# Patient Record
Sex: MALE | Race: WHITE | NOT HISPANIC OR LATINO | Employment: OTHER | URBAN - NONMETROPOLITAN AREA
[De-identification: names, ages, dates, MRNs, and addresses within clinical notes are randomized per-mention and may not be internally consistent; named-entity substitution may affect disease eponyms.]

---

## 2017-01-03 ENCOUNTER — CLINICAL SUPPORT (OUTPATIENT)
Dept: CARDIOLOGY | Facility: CLINIC | Age: 72
End: 2017-01-03

## 2017-01-03 DIAGNOSIS — R00.0 TACHYCARDIA: Primary | ICD-10-CM

## 2017-01-03 PROCEDURE — 93000 ELECTROCARDIOGRAM COMPLETE: CPT | Performed by: INTERNAL MEDICINE

## 2017-01-03 NOTE — PROGRESS NOTES
Procedure     ECG 12 Lead  Date/Time: 1/3/2017 6:24 PM  Performed by: NEGRO ELLIS  Authorized by: NEGRO ELLIS   Comparison: compared with previous ECG from 5/6/2016  Similar to previous ECG  Rhythm: sinus rhythm  Rate: normal  QRS axis: right  Q waves: II, aVF, V6 and V5  Clinical impression: abnormal ECG

## 2017-01-03 NOTE — MR AVS SNAPSHOT
Robbie Carmen   1/3/2017 3:00 PM   Appointment    Dept Phone:  [phone]   Encounter #:  [id number]    Provider:  NURSE NICHOLAS REY   Department:  [facility] CARDIOLOGY                Your Full Care Plan              Your Updated Medication List          This list is accurate as of: 1/3/17  3:26 PM.  Always use your most recent med list.                aspirin  MG tablet       atorvastatin 40 MG tablet   Commonly known as:  LIPITOR       diltiazem  MG 24 hr tablet   Commonly known as:  CARDIZEM LA   Take 1 tablet by mouth Daily.       DULoxetine 60 MG capsule   Commonly known as:  CYMBALTA       levothyroxine 150 MCG tablet   Commonly known as:  SYNTHROID, LEVOTHROID       lisinopril-hydrochlorothiazide 10-12.5 MG per tablet   Commonly known as:  PRINZIDE,ZESTORETIC       tamsulosin 0.4 MG capsule 24 hr capsule   Commonly known as:  FLOMAX       vitamin D 48541 UNITS capsule capsule   Commonly known as:  ERGOCALCIFEROL               Instructions     None    Patient Instructions History      Upcoming Appointments     Visit Type Date Time Department    ELECTROCARDIOGRAM 1/3/2017  3:00 PM [facility]    NURSE/MA VISIT 1/3/2017  3:15 PM [facility]    FOLLOW UP 6/29/2017  1:00 PM [facility]      MyChart Signup     Our records indicate that you have declined [facility] [employer] signup. If you would like to sign up for eG[employer], please email [email or web address] or call [phone] to obtain an activation code.             Other Info from Your Visit           Your Appointments     Jun 29, 2017  1:00 PM EDT   Follow Up with GITA Desouza   [facility] CARDIOLOGY (--)    [place] [id number] [phone]           Arrive 15 minutes prior to appointment.              Allergies     No Known Allergies      Vital Signs     Smoking Status                   Former Smoker

## 2017-01-19 ENCOUNTER — TELEPHONE (OUTPATIENT)
Dept: CARDIOLOGY | Facility: CLINIC | Age: 72
End: 2017-01-19

## 2017-01-19 NOTE — TELEPHONE ENCOUNTER
Patient called regarding his EKG done 1/3/17.  HR 88, /68    (The week prior you had restarted his Cardizem  mg daily and decreased his Lisinopril/HCTZ 10/12.5 mg to 1/2 tablet daily.) per dictation, may be able to stop the Lisinopril/HCTZ if BP still low....    Patient asking about stopping it for a trial period.  He has occasional dizzy spells and weakness.  No BP monitor at home.

## 2017-01-26 ENCOUNTER — CLINICAL SUPPORT (OUTPATIENT)
Dept: CARDIOLOGY | Facility: CLINIC | Age: 72
End: 2017-01-26

## 2017-01-26 DIAGNOSIS — R00.0 TACHYCARDIA: Primary | ICD-10-CM

## 2017-01-26 PROCEDURE — 93000 ELECTROCARDIOGRAM COMPLETE: CPT | Performed by: INTERNAL MEDICINE

## 2017-01-26 NOTE — MR AVS SNAPSHOT
Robbie Carmen   1/26/2017 9:30 AM   Appointment    Dept Phone:  [phone]   Encounter #:  [id number]    Provider:  NURSE NICHOLAS REY   Department:  [facility] CARDIOLOGY                Your Full Care Plan              Your Updated Medication List          This list is accurate as of: 1/26/17  9:40 AM.  Always use your most recent med list.                aspirin  MG tablet       atorvastatin 40 MG tablet   Commonly known as:  LIPITOR       diltiazem  MG 24 hr tablet   Commonly known as:  CARDIZEM LA   Take 1 tablet by mouth Daily.       DULoxetine 60 MG capsule   Commonly known as:  CYMBALTA       levothyroxine 150 MCG tablet   Commonly known as:  SYNTHROID, LEVOTHROID       tamsulosin 0.4 MG capsule 24 hr capsule   Commonly known as:  FLOMAX       vitamin D 69485 UNITS capsule capsule   Commonly known as:  ERGOCALCIFEROL               Instructions     None    Patient Instructions History      Upcoming Appointments     Visit Type Date Time Department    ELECTROCARDIOGRAM 1/26/2017  9:30 AM [facility]    FOLLOW UP 6/29/2017  1:00 PM [facility]      MyChart Signup     Our records indicate that you have declined [facility] [employer] signup. If you would like to sign up for [employer], please email [email or web address] or call [phone] to obtain an activation code.             Other Info from Your Visit           Your Appointments     Jun 29, 2017  1:00 PM EDT   Follow Up with GITA Desouza   [facility] CARDIOLOGY (--)    [place] [id number] [phone]           Arrive 15 minutes prior to appointment.              Allergies     No Known Allergies      Vital Signs     Smoking Status                   Former Smoker

## 2017-01-26 NOTE — PROGRESS NOTES
Procedure     ECG 12 Lead  Date/Time: 1/26/2017 1:24 PM  Performed by: NEGRO ELLIS  Authorized by: NEGRO ELLIS   Comparison: compared with previous ECG from 1/3/2017  Comparison to previous ECG: Rate is better  Rhythm: sinus rhythm  Rate: normal  Conduction: 1st degree  QRS axis: normal  Clinical impression: abnormal ECG

## 2017-06-28 ENCOUNTER — OFFICE VISIT (OUTPATIENT)
Dept: CARDIOLOGY | Facility: CLINIC | Age: 72
End: 2017-06-28

## 2017-06-28 VITALS
SYSTOLIC BLOOD PRESSURE: 110 MMHG | HEIGHT: 75 IN | WEIGHT: 291 LBS | HEART RATE: 80 BPM | DIASTOLIC BLOOD PRESSURE: 62 MMHG | BODY MASS INDEX: 36.18 KG/M2

## 2017-06-28 DIAGNOSIS — I25.10 CORONARY ARTERY DISEASE INVOLVING NATIVE CORONARY ARTERY OF NATIVE HEART WITHOUT ANGINA PECTORIS: Primary | ICD-10-CM

## 2017-06-28 DIAGNOSIS — E78.00 HYPERCHOLESTEREMIA: ICD-10-CM

## 2017-06-28 DIAGNOSIS — E88.81 METABOLIC SYNDROME: ICD-10-CM

## 2017-06-28 DIAGNOSIS — I10 ESSENTIAL HYPERTENSION: ICD-10-CM

## 2017-06-28 PROCEDURE — 99213 OFFICE O/P EST LOW 20 MIN: CPT | Performed by: NURSE PRACTITIONER

## 2017-06-28 RX ORDER — DILTIAZEM HYDROCHLORIDE EXTENDED-RELEASE TABLETS 360 MG/1
360 TABLET, EXTENDED RELEASE ORAL DAILY
Qty: 90 TABLET | Refills: 3 | Status: SHIPPED | OUTPATIENT
Start: 2017-06-28 | End: 2018-01-08 | Stop reason: DRUGHIGH

## 2017-06-28 NOTE — PROGRESS NOTES
Chief Complaint   Patient presents with   • Follow-up     denies any cardiac problems.    • Med Refill     Refills needed on diltiazem, 90 days to [employer].    • Diabetes     recently diagnosed by PCP, started metformin. Extensive teaching regarding low carb with patient.        Subjective       Robbie Carmen is a 71 y.o. male was diagnosed with ischemic heart disease in 1999. He underwent stenting in 2004. His last cardiac workup showed no evidence of ischemia. In 2016, he had stopped taking some of his medication including Cardizem. Due to tachycardia it was restarted. The dose of Lisinopril was decreased. Follow up EKG showed sinus with first degree. He has stopped Lisinopril and blood pressure was normal.   Today he comes to the office for a follow up appointment and no cardiac complaints voiced. He reports he has now been diagnosed with diabetes currently managing with diet. Overall, he states he is feeling better.     HPI         Cardiac History:    Past Surgical History:   Procedure Laterality Date   • CARDIAC CATHETERIZATION  08/17/1999    Cath- 65% RCA.    • CARDIAC CATHETERIZATION  11/30/2004    Cath- 75% RCA. 3.5 x 32 mm Taxus Stent.    • CARDIAC CATHETERIZATION  11/17/2005    Cath- 75% in-Stent Stenosis. 3.5 x 28 mm Cypher Stent   • CARDIAC CATHETERIZATION  05/24/2011    Cath- Patent Stent in RCA. PA- 44 mmHg    • CARDIOVASCULAR STRESS TEST  06/04/2007     Stress- 4 Min 85% THR. Inferior Infarct .     • CARDIOVASCULAR STRESS TEST  07/30/2009    Stress- 4 Min, 36 sec. 97% THR. BP- 182/92. Small lateral Ischemia.    • CARDIOVASCULAR STRESS TEST  05/10/2011    Stress- 3 min 30 sec. 86% THR. 170/82. Negative.    • CARDIOVASCULAR STRESS TEST  05/24/2016    (Mod) 7 Min, 14 Secs. 91% THR. BP- 178/88. Negative   • ECHO - CONVERTED  06/04/2007    Echo- EF 65%.    • ECHO - CONVERTED  07/30/2009    Echo- EF >60%, Mild MR.    • ECHO - CONVERTED  04/27/2010    S. Echo- 5 Min, 49 Sec. 88% THR. BP_ 178/80. Negative.     • OTHER SURGICAL HISTORY  05/11/2012    Carotid US- Normal.        Current Outpatient Prescriptions   Medication Sig Dispense Refill   • aspirin  MG tablet Take 325 mg by mouth Daily.     • atorvastatin (LIPITOR) 40 MG tablet Take 40 mg by mouth Daily.     • diltiazem LA (CARDIZEM LA) 360 MG 24 hr tablet Take 1 tablet by mouth Daily. 90 tablet 3   • DULoxetine (CYMBALTA) 60 MG capsule Take 60 mg by mouth Every Other Day. Takes on days that he isn't taking flomax.     • levothyroxine (SYNTHROID, LEVOTHROID) 150 MCG tablet Take 150 mcg by mouth Daily.     • tamsulosin (FLOMAX) 0.4 MG capsule 24 hr capsule Take 1 capsule by mouth Daily.     • vitamin D (ERGOCALCIFEROL) 57632 UNITS capsule capsule Take 50,000 Units by mouth 1 (One) Time Per Week.       No current facility-administered medications for this visit.        Review of patient's allergies indicates no known allergies.    Past Medical History:   Diagnosis Date   • Cancer     Larnyx CA   • COPD (chronic obstructive pulmonary disease)    • H/O hernia repair     Ruptured hernia repair   • History of cholecystectomy    • HTN (hypertension)    • Hypercholesterolemia    • Hypothyroidism    • IHD (ischemic heart disease)      s/p stenting   • Rheumatic fever     as child   • Sleep apnea     on CPAP.   • Spinal cord cysts     removed   • Thyroid cyst     removed       Social History     Social History   • Marital status:      Spouse name: N/A   • Number of children: N/A   • Years of education: N/A     Occupational History   • Not on file.     Social History Main Topics   • Smoking status: Former Smoker     Quit date: 1992   • Smokeless tobacco: Current User     Types: Snuff   • Alcohol use No   • Drug use: No   • Sexual activity: Not on file     Other Topics Concern   • Not on file     Social History Narrative       Family History   Problem Relation Age of Onset   • Diabetes Mother        Review of Systems   Constitution: Negative for decreased appetite,  "weakness and malaise/fatigue.   HENT: Negative for congestion, headaches and nosebleeds.    Eyes: Negative for visual disturbance.   Cardiovascular: Negative for chest pain, claudication, dyspnea on exertion, leg swelling and near-syncope.   Respiratory: Positive for shortness of breath (only with exertion).    Endocrine: Negative for polydipsia, polyphagia and polyuria.   Hematologic/Lymphatic: Negative for bleeding problem. Does not bruise/bleed easily.   Skin: Negative for nail changes.   Musculoskeletal: Positive for arthritis. Negative for falls and muscle cramps.   Gastrointestinal: Negative for abdominal pain, change in bowel habit, heartburn, melena and nausea.   Genitourinary: Negative for dysuria and hematuria.   Neurological: Negative for difficulty with concentration, light-headedness, loss of balance and tremors.   Psychiatric/Behavioral: The patient does not have insomnia and is not nervous/anxious.         Diabetes- Yes  Thyroid-normal    Objective     /62  Pulse 80  Ht 75\" (190.5 cm)  Wt 291 lb (132 kg)  BMI 36.37 kg/m2    Physical Exam   HENT:   Head: Normocephalic.   Eyes: Conjunctivae are normal. Pupils are equal, round, and reactive to light.   Neck: Neck supple. Carotid bruit is not present.   Cardiovascular: Normal rate, regular rhythm and S1 normal.    Pulses:       Radial pulses are 3+ on the right side, and 3+ on the left side.   Loud S2   Pulmonary/Chest: Effort normal and breath sounds normal. He has no wheezes. He has no rales.   Abdominal: Soft. Bowel sounds are normal.   Neurological: He is alert.   Skin: Skin is warm and dry.   Psychiatric: He has a normal mood and affect. His behavior is normal. Judgment and thought content normal.   Vitals reviewed.     Procedures        Assessment/Plan      Robbie was seen today for follow-up, med refill and diabetes.    Diagnoses and all orders for this visit:    Coronary artery disease involving native coronary artery of native heart " without angina pectoris    Essential hypertension    Hypercholesteremia    Metabolic syndrome    Other orders  -     diltiazem LA (CARDIZEM LA) 360 MG 24 hr tablet; Take 1 tablet by mouth Daily.      From a cardiac standpoint Mr. Carmen appears stable. We will continue same dose of Cardizem. I encouraged him on diabetic diet, exercise and weight loss. He will follow with you for management of labs. I have asked to the patient to bring a copy of his most recent labs with him to next visit. No cardiac testing warranted at this time. Should problems develop he understands to call.              Electronically signed by GIAT Hussein,  June 28, 2017 10:15 AM

## 2017-08-21 ENCOUNTER — HOSPITAL ENCOUNTER (OUTPATIENT)
Facility: HOSPITAL | Age: 72
Setting detail: OBSERVATION
Discharge: HOME HEALTH | End: 2017-08-23
Attending: EMERGENCY MEDICINE | Admitting: INTERNAL MEDICINE

## 2017-08-21 ENCOUNTER — APPOINTMENT (OUTPATIENT)
Dept: MRI IMAGING | Facility: HOSPITAL | Age: 72
End: 2017-08-21

## 2017-08-21 ENCOUNTER — TELEPHONE (OUTPATIENT)
Dept: CARDIOLOGY | Facility: CLINIC | Age: 72
End: 2017-08-21

## 2017-08-21 ENCOUNTER — APPOINTMENT (OUTPATIENT)
Dept: GENERAL RADIOLOGY | Facility: HOSPITAL | Age: 72
End: 2017-08-21

## 2017-08-21 ENCOUNTER — APPOINTMENT (OUTPATIENT)
Dept: CT IMAGING | Facility: HOSPITAL | Age: 72
End: 2017-08-21

## 2017-08-21 DIAGNOSIS — R55 NEAR SYNCOPE: ICD-10-CM

## 2017-08-21 DIAGNOSIS — R55 SYNCOPE, UNSPECIFIED SYNCOPE TYPE: ICD-10-CM

## 2017-08-21 DIAGNOSIS — S06.5XAA SUBDURAL HEMATOMA (HCC): ICD-10-CM

## 2017-08-21 DIAGNOSIS — F07.81 POST-CONCUSSION SYNDROME: Primary | ICD-10-CM

## 2017-08-21 DIAGNOSIS — Z74.09 IMPAIRED MOBILITY AND ADLS: ICD-10-CM

## 2017-08-21 DIAGNOSIS — Z78.9 IMPAIRED MOBILITY AND ADLS: ICD-10-CM

## 2017-08-21 DIAGNOSIS — Z74.09 IMPAIRED FUNCTIONAL MOBILITY, BALANCE, GAIT, AND ENDURANCE: ICD-10-CM

## 2017-08-21 DIAGNOSIS — R47.01 EXPRESSIVE APHASIA: ICD-10-CM

## 2017-08-21 DIAGNOSIS — S06.5XAA SDH (SUBDURAL HEMATOMA) (HCC): ICD-10-CM

## 2017-08-21 DIAGNOSIS — I25.118 CORONARY ARTERY DISEASE INVOLVING NATIVE CORONARY ARTERY OF NATIVE HEART WITH OTHER FORM OF ANGINA PECTORIS (HCC): Primary | ICD-10-CM

## 2017-08-21 DIAGNOSIS — E78.00 HYPERCHOLESTEREMIA: ICD-10-CM

## 2017-08-21 DIAGNOSIS — R41.3 SHORT-TERM MEMORY LOSS: ICD-10-CM

## 2017-08-21 DIAGNOSIS — I10 ESSENTIAL HYPERTENSION: ICD-10-CM

## 2017-08-21 LAB
ALBUMIN SERPL-MCNC: 4.4 G/DL (ref 3.2–4.8)
ALBUMIN/GLOB SERPL: 1.5 G/DL (ref 1.5–2.5)
ALP SERPL-CCNC: 118 U/L (ref 25–100)
ALT SERPL W P-5'-P-CCNC: 20 U/L (ref 7–40)
AMMONIA BLD-SCNC: 21 UMOL/L (ref 19–60)
ANION GAP SERPL CALCULATED.3IONS-SCNC: 8 MMOL/L (ref 3–11)
APTT PPP: 29.6 SECONDS (ref 24–31)
AST SERPL-CCNC: 16 U/L (ref 0–33)
BASOPHILS # BLD AUTO: 0.04 10*3/MM3 (ref 0–0.2)
BASOPHILS NFR BLD AUTO: 0.4 % (ref 0–1)
BILIRUB SERPL-MCNC: 0.6 MG/DL (ref 0.3–1.2)
BILIRUB UR QL STRIP: ABNORMAL
BNP SERPL-MCNC: 43 PG/ML (ref 0–100)
BUN BLD-MCNC: 15 MG/DL (ref 9–23)
BUN/CREAT SERPL: 13.6 (ref 7–25)
CALCIUM SPEC-SCNC: 9.8 MG/DL (ref 8.7–10.4)
CHLORIDE SERPL-SCNC: 99 MMOL/L (ref 99–109)
CLARITY UR: CLEAR
CO2 SERPL-SCNC: 29 MMOL/L (ref 20–31)
COLOR UR: ABNORMAL
CREAT BLD-MCNC: 1.1 MG/DL (ref 0.6–1.3)
DEPRECATED RDW RBC AUTO: 43.7 FL (ref 37–54)
EOSINOPHIL # BLD AUTO: 0.12 10*3/MM3 (ref 0–0.3)
EOSINOPHIL NFR BLD AUTO: 1.1 % (ref 0–3)
ERYTHROCYTE [DISTWIDTH] IN BLOOD BY AUTOMATED COUNT: 14.3 % (ref 11.3–14.5)
GFR SERPL CREATININE-BSD FRML MDRD: 66 ML/MIN/1.73
GLOBULIN UR ELPH-MCNC: 2.9 GM/DL
GLUCOSE BLD-MCNC: 129 MG/DL (ref 70–100)
GLUCOSE UR STRIP-MCNC: NEGATIVE MG/DL
HCT VFR BLD AUTO: 46.1 % (ref 38.9–50.9)
HGB BLD-MCNC: 16.2 G/DL (ref 13.1–17.5)
HGB UR QL STRIP.AUTO: NEGATIVE
IMM GRANULOCYTES # BLD: 0.1 10*3/MM3 (ref 0–0.03)
IMM GRANULOCYTES NFR BLD: 0.9 % (ref 0–0.6)
INR PPP: 1.01
KETONES UR QL STRIP: NEGATIVE
LEUKOCYTE ESTERASE UR QL STRIP.AUTO: NEGATIVE
LYMPHOCYTES # BLD AUTO: 1.48 10*3/MM3 (ref 0.6–4.8)
LYMPHOCYTES NFR BLD AUTO: 13.6 % (ref 24–44)
MCH RBC QN AUTO: 29.9 PG (ref 27–31)
MCHC RBC AUTO-ENTMCNC: 35.1 G/DL (ref 32–36)
MCV RBC AUTO: 85.2 FL (ref 80–99)
MONOCYTES # BLD AUTO: 0.92 10*3/MM3 (ref 0–1)
MONOCYTES NFR BLD AUTO: 8.5 % (ref 0–12)
NEUTROPHILS # BLD AUTO: 8.19 10*3/MM3 (ref 1.5–8.3)
NEUTROPHILS NFR BLD AUTO: 75.5 % (ref 41–71)
NITRITE UR QL STRIP: NEGATIVE
PH UR STRIP.AUTO: 5.5 [PH] (ref 5–8)
PLATELET # BLD AUTO: 165 10*3/MM3 (ref 150–450)
PMV BLD AUTO: 10.6 FL (ref 6–12)
POTASSIUM BLD-SCNC: 3.6 MMOL/L (ref 3.5–5.5)
PROT SERPL-MCNC: 7.3 G/DL (ref 5.7–8.2)
PROT UR QL STRIP: NEGATIVE
PROTHROMBIN TIME: 11 SECONDS (ref 9.6–11.5)
RBC # BLD AUTO: 5.41 10*6/MM3 (ref 4.2–5.76)
SODIUM BLD-SCNC: 136 MMOL/L (ref 132–146)
SP GR UR STRIP: 1.02 (ref 1–1.03)
TROPONIN I SERPL-MCNC: 0 NG/ML (ref 0–0.07)
UROBILINOGEN UR QL STRIP: ABNORMAL
WBC NRBC COR # BLD: 10.85 10*3/MM3 (ref 3.5–10.8)

## 2017-08-21 PROCEDURE — 83880 ASSAY OF NATRIURETIC PEPTIDE: CPT | Performed by: EMERGENCY MEDICINE

## 2017-08-21 PROCEDURE — 71010 HC CHEST PA OR AP: CPT

## 2017-08-21 PROCEDURE — 70450 CT HEAD/BRAIN W/O DYE: CPT

## 2017-08-21 PROCEDURE — 96375 TX/PRO/DX INJ NEW DRUG ADDON: CPT

## 2017-08-21 PROCEDURE — 25010000002 ONDANSETRON PER 1 MG: Performed by: EMERGENCY MEDICINE

## 2017-08-21 PROCEDURE — 70551 MRI BRAIN STEM W/O DYE: CPT

## 2017-08-21 PROCEDURE — 99285 EMERGENCY DEPT VISIT HI MDM: CPT

## 2017-08-21 PROCEDURE — 96374 THER/PROPH/DIAG INJ IV PUSH: CPT

## 2017-08-21 PROCEDURE — 81003 URINALYSIS AUTO W/O SCOPE: CPT | Performed by: EMERGENCY MEDICINE

## 2017-08-21 PROCEDURE — 82140 ASSAY OF AMMONIA: CPT | Performed by: EMERGENCY MEDICINE

## 2017-08-21 PROCEDURE — 93005 ELECTROCARDIOGRAM TRACING: CPT | Performed by: EMERGENCY MEDICINE

## 2017-08-21 PROCEDURE — 80053 COMPREHEN METABOLIC PANEL: CPT | Performed by: EMERGENCY MEDICINE

## 2017-08-21 PROCEDURE — 84484 ASSAY OF TROPONIN QUANT: CPT

## 2017-08-21 PROCEDURE — 85730 THROMBOPLASTIN TIME PARTIAL: CPT | Performed by: EMERGENCY MEDICINE

## 2017-08-21 PROCEDURE — 85610 PROTHROMBIN TIME: CPT | Performed by: EMERGENCY MEDICINE

## 2017-08-21 PROCEDURE — 85025 COMPLETE CBC W/AUTO DIFF WBC: CPT | Performed by: EMERGENCY MEDICINE

## 2017-08-21 PROCEDURE — 36415 COLL VENOUS BLD VENIPUNCTURE: CPT

## 2017-08-21 RX ORDER — SODIUM CHLORIDE 0.9 % (FLUSH) 0.9 %
10 SYRINGE (ML) INJECTION AS NEEDED
Status: DISCONTINUED | OUTPATIENT
Start: 2017-08-21 | End: 2017-08-23 | Stop reason: HOSPADM

## 2017-08-21 RX ORDER — FAMOTIDINE 10 MG/ML
40 INJECTION, SOLUTION INTRAVENOUS ONCE
Status: COMPLETED | OUTPATIENT
Start: 2017-08-21 | End: 2017-08-21

## 2017-08-21 RX ORDER — ONDANSETRON 2 MG/ML
4 INJECTION INTRAMUSCULAR; INTRAVENOUS ONCE
Status: COMPLETED | OUTPATIENT
Start: 2017-08-21 | End: 2017-08-21

## 2017-08-21 RX ADMIN — ONDANSETRON 4 MG: 2 INJECTION INTRAMUSCULAR; INTRAVENOUS at 21:56

## 2017-08-21 RX ADMIN — FAMOTIDINE 40 MG: 10 INJECTION, SOLUTION INTRAVENOUS at 23:37

## 2017-08-21 NOTE — TELEPHONE ENCOUNTER
Patient's daughter called in and reports that patient had a spell at Dr. Gilberto Garg's office almost passed out twice and broke out in a cold sweat, c/o of being weak, b/p 110/68 pulse 110, c/o being short of breath, improved a little after eating snack, but was told by Dr. Gilberto Garg's office could possibly be cardiac, patient had stenting years ago. Had fall 2 week's ago with bleed on brain and was hospitalized last Monday but is home now, aspirin discontinued due to bleed. What are your recommendation's?      [facility]  States spoke with Dr. Mccormack and he had told them to take him to ER but patient refused.           Meds:  cardizem 360mg daily

## 2017-08-21 NOTE — TELEPHONE ENCOUNTER
Notified caregiver of new orders for stress and Echo and that someone will be contacting her with appointment date and time. Verbalizes understanding of instructions.

## 2017-08-22 ENCOUNTER — APPOINTMENT (OUTPATIENT)
Dept: CT IMAGING | Facility: HOSPITAL | Age: 72
End: 2017-08-22

## 2017-08-22 PROBLEM — E11.9 TYPE 2 DIABETES MELLITUS: Status: ACTIVE | Noted: 2017-08-22

## 2017-08-22 PROBLEM — F07.81 POST-CONCUSSION SYNDROME: Status: ACTIVE | Noted: 2017-08-22

## 2017-08-22 PROBLEM — G47.30 SLEEP APNEA: Status: ACTIVE | Noted: 2017-08-22

## 2017-08-22 PROBLEM — J44.9 COPD (CHRONIC OBSTRUCTIVE PULMONARY DISEASE): Status: ACTIVE | Noted: 2017-08-22

## 2017-08-22 PROBLEM — S06.5XAA SUBDURAL HEMATOMA: Status: ACTIVE | Noted: 2017-08-22

## 2017-08-22 LAB
ANION GAP SERPL CALCULATED.3IONS-SCNC: 9 MMOL/L (ref 3–11)
BUN BLD-MCNC: 13 MG/DL (ref 9–23)
BUN/CREAT SERPL: 13 (ref 7–25)
CALCIUM SPEC-SCNC: 9.5 MG/DL (ref 8.7–10.4)
CHLORIDE SERPL-SCNC: 101 MMOL/L (ref 99–109)
CO2 SERPL-SCNC: 26 MMOL/L (ref 20–31)
CREAT BLD-MCNC: 1 MG/DL (ref 0.6–1.3)
DEPRECATED RDW RBC AUTO: 43.5 FL (ref 37–54)
ERYTHROCYTE [DISTWIDTH] IN BLOOD BY AUTOMATED COUNT: 14.2 % (ref 11.3–14.5)
GFR SERPL CREATININE-BSD FRML MDRD: 73 ML/MIN/1.73
GLUCOSE BLD-MCNC: 120 MG/DL (ref 70–100)
GLUCOSE BLDC GLUCOMTR-MCNC: 117 MG/DL (ref 70–130)
GLUCOSE BLDC GLUCOMTR-MCNC: 131 MG/DL (ref 70–130)
GLUCOSE BLDC GLUCOMTR-MCNC: 134 MG/DL (ref 70–130)
GLUCOSE BLDC GLUCOMTR-MCNC: 136 MG/DL (ref 70–130)
HCT VFR BLD AUTO: 42.8 % (ref 38.9–50.9)
HGB BLD-MCNC: 15 G/DL (ref 13.1–17.5)
MCH RBC QN AUTO: 29.7 PG (ref 27–31)
MCHC RBC AUTO-ENTMCNC: 35 G/DL (ref 32–36)
MCV RBC AUTO: 84.8 FL (ref 80–99)
PLATELET # BLD AUTO: 141 10*3/MM3 (ref 150–450)
PMV BLD AUTO: 10.7 FL (ref 6–12)
POTASSIUM BLD-SCNC: 3.7 MMOL/L (ref 3.5–5.5)
RBC # BLD AUTO: 5.05 10*6/MM3 (ref 4.2–5.76)
SODIUM BLD-SCNC: 136 MMOL/L (ref 132–146)
TROPONIN I SERPL-MCNC: 0 NG/ML (ref 0–0.07)
WBC NRBC COR # BLD: 8.96 10*3/MM3 (ref 3.5–10.8)

## 2017-08-22 PROCEDURE — G8979 MOBILITY GOAL STATUS: HCPCS

## 2017-08-22 PROCEDURE — G8987 SELF CARE CURRENT STATUS: HCPCS

## 2017-08-22 PROCEDURE — G8988 SELF CARE GOAL STATUS: HCPCS

## 2017-08-22 PROCEDURE — G0378 HOSPITAL OBSERVATION PER HR: HCPCS

## 2017-08-22 PROCEDURE — G8978 MOBILITY CURRENT STATUS: HCPCS

## 2017-08-22 PROCEDURE — 80048 BASIC METABOLIC PNL TOTAL CA: CPT | Performed by: PHYSICIAN ASSISTANT

## 2017-08-22 PROCEDURE — 82962 GLUCOSE BLOOD TEST: CPT

## 2017-08-22 PROCEDURE — 97161 PT EVAL LOW COMPLEX 20 MIN: CPT

## 2017-08-22 PROCEDURE — 99219 PR INITIAL OBSERVATION CARE/DAY 50 MINUTES: CPT | Performed by: PHYSICIAN ASSISTANT

## 2017-08-22 PROCEDURE — 97165 OT EVAL LOW COMPLEX 30 MIN: CPT

## 2017-08-22 PROCEDURE — 85027 COMPLETE CBC AUTOMATED: CPT | Performed by: PHYSICIAN ASSISTANT

## 2017-08-22 PROCEDURE — 70450 CT HEAD/BRAIN W/O DYE: CPT

## 2017-08-22 PROCEDURE — 99220 PR INITIAL OBSERVATION CARE/DAY 70 MINUTES: CPT | Performed by: PHYSICIAN ASSISTANT

## 2017-08-22 RX ORDER — ACETAMINOPHEN 325 MG/1
650 TABLET ORAL EVERY 4 HOURS PRN
Status: DISCONTINUED | OUTPATIENT
Start: 2017-08-22 | End: 2017-08-23 | Stop reason: HOSPADM

## 2017-08-22 RX ORDER — ONDANSETRON 2 MG/ML
4 INJECTION INTRAMUSCULAR; INTRAVENOUS EVERY 6 HOURS PRN
Status: DISCONTINUED | OUTPATIENT
Start: 2017-08-22 | End: 2017-08-23 | Stop reason: HOSPADM

## 2017-08-22 RX ORDER — SODIUM CHLORIDE 0.9 % (FLUSH) 0.9 %
1-10 SYRINGE (ML) INJECTION AS NEEDED
Status: DISCONTINUED | OUTPATIENT
Start: 2017-08-22 | End: 2017-08-23 | Stop reason: HOSPADM

## 2017-08-22 RX ORDER — ONDANSETRON 4 MG/1
4 TABLET, FILM COATED ORAL EVERY 6 HOURS PRN
Status: DISCONTINUED | OUTPATIENT
Start: 2017-08-22 | End: 2017-08-23 | Stop reason: HOSPADM

## 2017-08-22 RX ORDER — NICOTINE POLACRILEX 4 MG
15 LOZENGE BUCCAL
Status: DISCONTINUED | OUTPATIENT
Start: 2017-08-22 | End: 2017-08-23 | Stop reason: HOSPADM

## 2017-08-22 RX ORDER — ATORVASTATIN CALCIUM 40 MG/1
40 TABLET, FILM COATED ORAL NIGHTLY
Status: DISCONTINUED | OUTPATIENT
Start: 2017-08-22 | End: 2017-08-23 | Stop reason: HOSPADM

## 2017-08-22 RX ORDER — DEXTROSE MONOHYDRATE 25 G/50ML
25 INJECTION, SOLUTION INTRAVENOUS
Status: DISCONTINUED | OUTPATIENT
Start: 2017-08-22 | End: 2017-08-23 | Stop reason: HOSPADM

## 2017-08-22 RX ORDER — IPRATROPIUM BROMIDE AND ALBUTEROL SULFATE 2.5; .5 MG/3ML; MG/3ML
3 SOLUTION RESPIRATORY (INHALATION) EVERY 4 HOURS PRN
Status: DISCONTINUED | OUTPATIENT
Start: 2017-08-22 | End: 2017-08-23 | Stop reason: HOSPADM

## 2017-08-22 RX ADMIN — ATORVASTATIN CALCIUM 40 MG: 40 TABLET, FILM COATED ORAL at 21:32

## 2017-08-22 NOTE — PROGRESS NOTES
"Adult Nutrition  Assessment/PES    Patient Name:  Robbie Carmen  YOB: 1945  MRN: [id number]  Admit Date:  8/21/2017    Assessment Date:  8/22/2017        Reason for Assessment       08/22/17 1535    Reason for Assessment    Endocrine DM    Neurological AMS;SDH   ST memory loss, post-concussion syndrome, aphasic    Pulmonary/Critical Care COPD      08/22/17 1522    Reason for Assessment    Reason For Assessment/Visit admission assessment    Time Spent (min) 20                Anthropometrics       08/22/17 1523    Anthropometrics    Height 190.5 cm (75\")    Weight 124 kg (274 lb)    Ideal Body Weight (IBW)    Ideal Body Weight (IBW), Male (kg) 90.45    % Ideal Body Weight 137.7    Usual Body Weight (UBW)    Weight Loss --   pt/fam deny that pt has had any wt changes prior to adm.    Body Mass Index (BMI)    BMI (kg/m2) 34.32                  Nutrition Prescription Ordered       08/22/17 1530    Nutrition Prescription PO    Current PO Diet Regular    Common Modifiers Cardiac;Consistent Carbohydrate            Evaluation of Received Nutrient/Fluid Intake       08/22/17 1530    PO Evaluation    Number of Meals 1    % PO Intake 75              Problem/Interventions:        Problem 1       08/22/17 1530    Nutrition Diagnoses Problem 1    Problem 1 No Nutrition Diagnosis at this Time                    Intervention Goal       08/22/17 1530    Intervention Goal    PO Establish PO            Nutrition Intervention       08/22/17 1530    Nutrition Intervention    RD/Tech Action Follow Tx progress              Education/Evaluation       08/22/17 1530    Monitor/Evaluation    Monitor Per protocol        Comments:      Electronically signed by:  Christina Scott MS,RD,LD  08/22/17 3:37 PM  "

## 2017-08-22 NOTE — CONSULTS
[facility]    NEUROSURGERY CONSULTATION    Patient: Robbie Carmen  : 1945   MRN: [id number]    Admitting Provider:   Viktor Dobson MD    Admitting Diagnosis:  Post-concussion syndrome  Date of Admission:  2017    Patient Care Team:  Justin Mccormack MD as PCP - General (Family Medicine)    Reason for Consultation: evaluate recent intracranial bleed    History of Present Illness:   Mr Carmen is a 71 y.o. woman who is seen today at bedside for evaluation of recent subdural hematoma.  His wife is present today and assist with history provided. He reportedly fell 2 weeks ago from a stool and hit his head on a concrete wall.  A week later he presented to his PCP in [place] because his headaches were not getting any better.  His PCP ordered a CT scan which revealed a brain bleed and he was subsequently admitted to [facility] in [place] 3 days.  He was discharged in stable condition and instructed to follow-up with Neuro for a follow-up CT scan yesterday.  His CT scan yesterday was unchanged.  However, Neuro recommended him to follow up with his PCP because he was having dizziness, confusion, and gait instability.  He was not able to get in to see his PCP yesterday and thus, presented to [facility] for evaluation.      At this time, his symptoms have improved.  His headache has not worsened, currently a 'dull pain', 4/10.  He does not feel disoriented currently.  His gait is unstable, but improving.  He denies vision changes, worsening pain, bowel/bladder changes, back pain, numbness, tingling.      Review of Systems:  Musculoskeletal and Neurological systems were reviewed and are negative except for:  Musculoskeletal: negative.  Neurological: positive for confusion, dizziness, headaches, light headedness, numbness, and speech abnormality upon admission which as improved since admission as noted in HPI.  From  H&P:  Constitutional: Negative for fatigue and fever.   HENT: Negative for congestion and trouble swallowing.    Eyes: Negative for photophobia and visual disturbance.   Respiratory: Negative for shortness of breath.    Cardiovascular: Negative for chest pain and leg swelling.   Gastrointestinal: Negative for abdominal pain, diarrhea, nausea and vomiting.   Endocrine: Negative for cold intolerance and heat intolerance.   Genitourinary: Negative for dysuria and flank pain.   Musculoskeletal: Negative for arthralgias and back pain.   Skin: Negative for pallor and rash.   Allergic/Immunologic: Negative for immunocompromised state.    History:  Past Medical History:   Diagnosis Date   • Cancer     Larnyx CA   • COPD (chronic obstructive pulmonary disease)    • Diabetes mellitus    • H/O hernia repair     Ruptured hernia repair   • History of cholecystectomy    • HTN (hypertension)    • Hypercholesterolemia    • Hypothyroidism    • IHD (ischemic heart disease)      s/p stenting   • Intracranial hemorrhage    • Rheumatic fever     as child   • Sleep apnea     on CPAP.   • Spinal cord cysts     removed   • Thyroid cyst     removed     Past Surgical History:   Procedure Laterality Date   • CARDIAC CATHETERIZATION  08/17/1999    Cath- 65% RCA.    • CARDIAC CATHETERIZATION  11/30/2004    Cath- 75% RCA. 3.5 x 32 mm Taxus Stent.    • CARDIAC CATHETERIZATION  11/17/2005    Cath- 75% in-Stent Stenosis. 3.5 x 28 mm Cypher Stent   • CARDIAC CATHETERIZATION  05/24/2011    Cath- Patent Stent in RCA. PA- 44 mmHg    • CARDIOVASCULAR STRESS TEST  06/04/2007     Stress- 4 Min 85% THR. Inferior Infarct .     • CARDIOVASCULAR STRESS TEST  07/30/2009    Stress- 4 Min, 36 sec. 97% THR. BP- 182/92. Small lateral Ischemia.    • CARDIOVASCULAR STRESS TEST  05/10/2011    Stress- 3 min 30 sec. 86% THR. 170/82. Negative.    • CARDIOVASCULAR STRESS TEST  05/24/2016    (Mod) 7 Min, 14 Secs. 91% THR. BP- 178/88. Negative   • CHOLECYSTECTOMY     •  "CORONARY STENT PLACEMENT     • ECHO - CONVERTED  06/04/2007    Echo- EF 65%.    • ECHO - CONVERTED  07/30/2009    Echo- EF >60%, Mild MR.    • ECHO - CONVERTED  04/27/2010    S. Echo- 5 Min, 49 Sec. 88% THR. BP_ 178/80. Negative.    • OTHER SURGICAL HISTORY  05/11/2012    Carotid US- Normal.    • VOCAL FOLD LESION EXCISION       Family History   Problem Relation Age of Onset   • Diabetes Mother      Social History     Social History   • Marital status:      Spouse name: N/A   • Number of children: N/A   • Years of education: N/A     Occupational History   • Not on file.     Social History Main Topics   • Smoking status: Former Smoker     Quit date: 1992   • Smokeless tobacco: Current User     Types: Snuff   • Alcohol use No   • Drug use: No   • Sexual activity: Not on file     Other Topics Concern   • Not on file     Social History Narrative       Allergies:  Review of patient's allergies indicates no known allergies.  Prescriptions Prior to Admission   Medication Sig Dispense Refill Last Dose   • aspirin  MG tablet Take 325 mg by mouth Daily.   Taking   • atorvastatin (LIPITOR) 40 MG tablet Take 40 mg by mouth Daily.   Taking   • diltiazem LA (CARDIZEM LA) 360 MG 24 hr tablet Take 1 tablet by mouth Daily. 90 tablet 3    • DULoxetine (CYMBALTA) 60 MG capsule Take 60 mg by mouth Every Other Day. Takes on days that he isn't taking flomax.   Taking   • levothyroxine (SYNTHROID, LEVOTHROID) 150 MCG tablet Take 150 mcg by mouth Daily.   Taking   • tamsulosin (FLOMAX) 0.4 MG capsule 24 hr capsule Take 1 capsule by mouth Daily.   Taking   • vitamin D (ERGOCALCIFEROL) 55281 UNITS capsule capsule Take 50,000 Units by mouth 1 (One) Time Per Week.   Taking        Physical Exam:    Vitals: /98 (BP Location: Left arm, Patient Position: Lying)  Pulse 88  Temp 98.2 °F (36.8 °C) (Oral)   Resp 16  Ht 75\" (190.5 cm)  Wt 274 lb (124 kg)  SpO2 97%  BMI 34.25 kg/m2   General Appearance:   WDWN, alert, and " cooperative. NAD.   HEENT: NCAT  PERRLA bilaterally.   Chest Breathing unlabored.   Neck and Back: Normal ROM.     Extremities: No edema or deformities.   Mood: Appropriate mood and affect.   Higher Function: AAOx3. Speech intact.  Recent and remote memory intact.   Motor: Normal muscle strength, bulk and tone in upper and lower extremities.  No fasciculations, rigidity, spasticity, or abnormal movements.    Coordination: Coordination is intact. Finger to nose test shows no dysmetria.  Rapid alternating movements are normal.  Heel to shin normal.   Sensation: Sensation is intact to light touch testing throughout.  Number identification intact   Reflexes: 2+ in the upper and lower extremities.   Special Test: No Eaton's signs  No pronator drift.     CN II Visual fields are full to confrontation   CN III IV VI: PERRLAB.  EOMI  Nystagmus is not present.   CN V:  Normal facial sensation and strength of muscles of mastication.   CN VII: Facial movements are symmetric.   CN VIII: Hearing is intact to finger rub bilaterally.  Auditory acuity is normal.   CN IX & X: Palate elevates symmetrically.   CN XI: SCM and trapezius are normal. Shoulder shrug is intact. No weakness.   CN XII: Tongue is midline without evidence of atrophy or fasciculation     Imaging:  Serial CTs of the Head and MRI of Brain reveal stable appearing left subdural hemorrhage and stable left anterior and posterior parafalcine hemorrhage with approx 6 mm rightward midline shift.       Laboratory Results:   Abnormal Lab Results (most recent)     Procedure Component Value Units Date/Time    CBC Auto Differential [[id number]]  (Abnormal) Collected:  08/21/17 2128    Specimen:  Blood Updated:  08/21/17 2143     WBC 10.85 (H) 10*3/mm3      Neutrophil % 75.5 (H) %      Lymphocyte % 13.6 (L) %      Monocyte % 8.5 %      Eosinophil % 1.1 %      Basophil % 0.4 %     Comprehensive Metabolic Panel [[id number]]  (Abnormal) Collected:  08/21/17 2128    Specimen:   Blood Updated:  08/21/17 2201     Glucose 129 (H) mg/dL      Alkaline Phosphatase 118 (H) U/L      Problem List:  Principal Problem:    Subdural hematoma with post-concussive syndrome  Active Problems:    Coronary artery disease involving native coronary artery of native heart without angina pectoris    Essential hypertension    Hypercholesteremia    Acquired hypothyroidism    COPD (chronic obstructive pulmonary disease)    Type 2 diabetes mellitus    Sleep apnea    Post-concussion syndrome    Assessment & Plan     ASSESSMENT  Subdural hematoma (left lateral and anterior and posterior parafalcine) which is non-acute and stable per imaging.      TREATMENT RECOMMENDATIONS  Mr Carmen is being seen in consult for left subdural hematoma at the request of Dr. Dobson.  From a neurosurgical standpoint, acute surgical intervention is not warranted at this time.  Further recommendations will ensue pending evaluation by Dr. White.      Thank you for the opportunity to assist in the care of Mr Carmen.    KOURTNEY Elizabeth MD  [facility]  08/22/17  3:42 PM        As per Ms. Glynn above.  The patient is a 71-year-old ambidextrous gentleman who fell backward on a rolling chair about 2 weeks ago and struck his head on a level.  He was hospitalized in [place] for about 3 days given his subdural hematoma.  After going home he developed some confusion and disorientation and the physician's assistant at the neurosurgical practice in [place] told him to follow-up with his primary care provider.  Given the above-noted difficulties as well as headache the patient's family brought him to this facility for evaluation.  By yesterday morning his headache had largely settled down.  His confusion as also abated.  His wife reports that he is largely back to himself.    Examination:  The patient is awake, alert, and appropriate.  Repetition is intact.  He has some subtle difficulty  with naming.  He is oriented to month and day as well as the circumstances.  There is no pronator drift.    Data review:  CTs have been done yesterday and today and demonstrate right-sided interhemispheric chronic subdural hematoma.  MRI confirms the above-noted interhemispheric chronic subdural hematoma.  There is trace subdural hematoma over the convexity.  There is no significant mass effect or shift.    Impression/recommendation:  The patient has what is now a subacute/chronic subdural hematoma.  He has modest symptoms associated with this.  The patient should follow up either with the neurosurgeon that he has seen in [place] or at this facility.  I would recommend obtaining a follow-up CT of the brain in 2-1/2-3 weeks.  There is a little utility in performing daily CT scans for his chronic subdural hematoma.    Laureano White MD

## 2017-08-22 NOTE — THERAPY EVALUATION
Acute Care - Physical Therapy Initial Evaluation  [facility]     Patient Name: Robbie Carmen  : 1945  MRN: [id number]  Today's Date: 2017   Onset of Illness/Injury or Date of Surgery Date: (P) 17  Date of Referral to PT: 17  Referring Physician: ZAFAR Marie (P)      Admit Date: 2017     Visit Dx:    ICD-10-CM ICD-9-CM   1. Post-concussion syndrome F07.81 310.2   2. Near syncope R55 780.2   3. Essential hypertension I10 401.9   4. Hypercholesteremia E78.00 272.0   5. SDH (subdural hematoma) I62.00 432.1   6. Expressive aphasia R47.01 784.3   7. Short-term memory loss R41.3 780.93   8. Impaired functional mobility, balance, gait, and endurance Z74.09 V49.89   9. Impaired mobility and ADLs Z74.09 799.89     Patient Active Problem List   Diagnosis   • Coronary artery disease involving native coronary artery of native heart without angina pectoris   • Essential hypertension   • Hypercholesteremia   • Acquired hypothyroidism   • Metabolic syndrome   • COPD (chronic obstructive pulmonary disease)   • Type 2 diabetes mellitus   • Sleep apnea   • Subdural hematoma with post-concussive syndrome   • Post-concussion syndrome     Past Medical History:   Diagnosis Date   • Cancer     Larnyx CA   • COPD (chronic obstructive pulmonary disease)    • Diabetes mellitus    • H/O hernia repair     Ruptured hernia repair   • History of cholecystectomy    • HTN (hypertension)    • Hypercholesterolemia    • Hypothyroidism    • IHD (ischemic heart disease)      s/p stenting   • Intracranial hemorrhage    • Rheumatic fever     as child   • Sleep apnea     on CPAP.   • Spinal cord cysts     removed   • Thyroid cyst     removed     Past Surgical History:   Procedure Laterality Date   • CARDIAC CATHETERIZATION  1999    Cath- 65% RCA.    • CARDIAC CATHETERIZATION  2004    Cath- 75% RCA. 3.5 x 32 mm Taxus Stent.    • CARDIAC CATHETERIZATION  2005    Cath- 75% in-Stent Stenosis. 3.5 x 28 mm Cypher  Stent   • CARDIAC CATHETERIZATION  05/24/2011    Cath- Patent Stent in RCA. PA- 44 mmHg    • CARDIOVASCULAR STRESS TEST  06/04/2007     Stress- 4 Min 85% THR. Inferior Infarct .     • CARDIOVASCULAR STRESS TEST  07/30/2009    Stress- 4 Min, 36 sec. 97% THR. BP- 182/92. Small lateral Ischemia.    • CARDIOVASCULAR STRESS TEST  05/10/2011    Stress- 3 min 30 sec. 86% THR. 170/82. Negative.    • CARDIOVASCULAR STRESS TEST  05/24/2016    (Mod) 7 Min, 14 Secs. 91% THR. BP- 178/88. Negative   • CHOLECYSTECTOMY     • CORONARY STENT PLACEMENT     • ECHO - CONVERTED  06/04/2007    Echo- EF 65%.    • ECHO - CONVERTED  07/30/2009    Echo- EF >60%, Mild MR.    • ECHO - CONVERTED  04/27/2010    S. Echo- 5 Min, 49 Sec. 88% THR. BP_ 178/80. Negative.    • OTHER SURGICAL HISTORY  05/11/2012    Carotid US- Normal.    • VOCAL FOLD LESION EXCISION            PT ASSESSMENT (last 72 hours)      PT Evaluation       08/22/17 0827 08/22/17 0822    Rehab Evaluation    Document Type (P)  evaluation  - evaluation  -    Subjective Information (P)  agree to therapy;no complaints  - agree to therapy;no complaints  -    Patient Effort, Rehab Treatment (P)  good  - good  -    Symptoms Noted During/After Treatment (P)  fatigue  - fatigue  -    General Information    Patient Profile Review (P)  yes  -AC yes  -    Onset of Illness/Injury or Date of Surgery Date (P)  08/21/17  - 08/21/17  -    Referring Physician (P)  ZAFAR Marie  -ZAFAR Mckinnon  -AISHWARYA    General Observations (P)  Pt supine in bed.  Wife present in room.  - Pt supine in bed, IV heplocked, tele. Wife present  -    Pertinent History Of Current Problem (P)  Pt with recent fall hitting head on concreate and sustianing post concussion SDH.  Pt now presents with AMS, memory loss, unsteady gait with 2 recent syncopal episodes.   - Pt presents with AMS, unsteady gait, syncope and ST memory loss. Pt fell 2 weeks ago from stool hitting head on concrete wall, found to have  brain bleed. CT since then has shown shrinkage in bleed  -MJ    Precautions/Limitations (P)  fall precautions   exit alarm   -AC fall precautions;other (see comments)   exit alarm  -MJ    Prior Level of Function (P)  independent:;all household mobility;community mobility;ADL's;driving  -AC independent:;all household mobility;community mobility;gait;transfer;bed mobility  -MJ    Equipment Currently Used at Home (P)  none  -AC none  -MJ    Plans/Goals Discussed With (P)  patient and family;agreed upon  -AC patient and family;agreed upon  -MJ    Risks Reviewed (P)  patient and family:;LOB  -AC patient and family:;LOB;increased discomfort  -MJ    Benefits Reviewed (P)  patient and family:;improve function;increase independence;increase strength;increase balance;increase knowledge  -AC patient and family:;improve function;increase independence;increase strength;increase balance;decrease pain  -MJ    Barriers to Rehab (P)  none identified  -AC none identified  -MJ    Living Environment    Lives With (P)  spouse  -AC spouse  -MJ    Living Arrangements (P)  house  -AC house  -MJ    Home Accessibility (P)  stairs to enter home;tub/shower is not walk in  -AC stairs to enter home;tub/shower is not walk in  -MJ    Number of Stairs to Enter Home (P)  4  -AC 4  -MJ    Stair Railings at Home (P)  outside, present on left side  -AC outside, present on left side  -MJ    Clinical Impression    Date of Referral to PT  08/22/17  -MJ    PT Diagnosis  impaired functional mobility d/t concussion  -    Criteria for Skilled Therapeutic Interventions Met  yes;treatment indicated  -MJ    Vital Signs    Pre Systolic BP Rehab (P)  155  -  -MJ    Pre Treatment Diastolic BP (P)  97  -  -MJ    Intra Systolic BP Rehab (P)  143  -AC     Intra Treatment Diastolic BP (P)  23  -AC     Post Systolic BP Rehab (P)  161  -  -MJ    Post Treatment Diastolic BP (P)  93  -AC 93  -MJ    Pretreatment Heart Rate (beats/min) (P)  73  -AC      Posttreatment Heart Rate (beats/min) (P)  71  -AC 81  -MJ    Pre SpO2 (%) (P)  93  -AC     O2 Delivery Pre Treatment (P)  room air  -AC     Post SpO2 (%) (P)  97  -AC 93  -MJ    O2 Delivery Post Treatment (P)  room air  -AC room air  -MJ    Pre Patient Position  Supine  -MJ    Post Patient Position  Sitting  -MJ    Pain Assessment    Pain Assessment (P)  No/denies pain  -AC No/denies pain  -MJ    Vision Assessment/Intervention    Vision Comment (P)  mild peripheral impairment R   -AC     Cognitive Assessment/Intervention    Current Cognitive/Communication Assessment (P)  --   delay in response time after ambulating  -AC impaired   intermittent confusion  -MJ    Orientation Status (P)  oriented to;person;place   oriented to year, but not month  -AC oriented to;person;place;time;required verbal cueing (specifiy in comments)   Cues for month, knew year  -    Follows Commands/Answers Questions (P)  100% of the time;needs cueing  - 100% of the time;able to follow single-step instructions;needs cueing;needs repetition  -    Personal Safety (P)  mild impairment  -AC mild impairment  -MJ    Personal Safety Interventions (P)  fall prevention program maintained;gait belt;nonskid shoes/slippers when out of bed  -AC     ROM (Range of Motion)    General ROM (P)  no range of motion deficits identified  -AC no range of motion deficits identified  -MJ    MMT (Manual Muscle Testing)    General MMT Assessment (P)  upper extremity strength deficits identified  -AC lower extremity strength deficits identified  -    Lower Extremity    Lower Ext Manual Muscle Testing Detail  R LE 4/5; L LE 4+/5  -MJ    Bed Mobility, Assessment/Treatment    Bed Mobility, Assistive Device  bed rails;head of bed elevated  -MJ    Bed Mob, Supine to Sit, Green  contact guard assist;verbal cues required  -MJ    Bed Mob, Sit to Supine, Green  not tested   Pt UIC  -MJ    Bed Mobility, Impairments  strength decreased  -MJ    Bed Mobility,  Comment  Verbal cues to move LEs off EOB and to use UEs to push trunk to sitting. Increased time and effort to perform  -MJ    Transfer Assessment/Treatment    Transfers, Sit-Stand [place]  contact guard assist;verbal cues required  -MJ    Transfers, Stand-Sit [place]  contact guard assist;verbal cues required  -MJ    Transfer, Safety Issues  step length decreased  -MJ    Transfer, Impairments  strength decreased;impaired balance  -MJ    Transfer, Comment  Verbal cues for correct hand placement. Pt denies dizziness in standing  -    Gait Assessment/Treatment    Gait, [place] Level  contact guard assist;2 person assist required;verbal cues required  -MJ    Gait, Assistive Device  --   none  -MJ    Gait, Distance (Feet)  200  -MJ    Gait, Gait Pattern Analysis  swing-through gait  -    Gait, Gait Deviations  tana decreased;step length decreased;weight-shifting ability decreased  -    Gait, Safety Issues  step length decreased  -MJ    Gait, Impairments  strength decreased;impaired balance  -MJ    Gait, Comment  Pt demo step through gait pattern at slow pace. Pt mildly unsteady but no LOB. Chair follow for safety d/t h/o syncope, but pt did not require. Gait limited by fatigue  -    Sensory Assessment/Intervention    Light Touch  LLE;RLE  -MJ    LLE Light Touch  WNL  -MJ    RLE Light Touch  WNL  -MJ    Positioning and Restraints    Pre-Treatment Position  in bed  -    Post Treatment Position  chair  -MJ    In Chair  notified nsg;reclined;call light within reach;encouraged to call for assist;exit alarm on;with family/caregiver  -      08/22/17 7702       General Information    Equipment Currently Used at Home none  -     Living Environment    Lives With spouse  -     Living Arrangements house  -KB     Home Accessibility bed and bath on same level;stairs to enter home  -     Stair Railings at Home outside, present on left side  -     Type of Financial/Environmental Concern none  -      Transportation Available car;family or friend will provide  -KB       User Key  (r) = Recorded By, (t) = Taken By, (c) = Cosigned By    Initials Name Provider Type    AC Kenya Carpio, OT Occupational Therapist    AISHWARYA Steve, PT Physical Therapist    IJEOMA Fonseca, RN Registered Nurse          Physical Therapy Education     Title: PT OT SLP Therapies (Active)     Topic: Physical Therapy (Active)     Point: Mobility training (Done)    Learning Progress Summary    Learner Readiness Method Response Comment Documented by Status   Patient Acceptance E,D VU,NR Reviewed benefits of activity  08/22/17 0923 Done   Significant Other Acceptance E,D VU,NR Reviewed benefits of activity  08/22/17 0923 Done               Point: Body mechanics (Done)    Learning Progress Summary    Learner Readiness Method Response Comment Documented by Status   Patient Acceptance E,D VU,NR Reviewed benefits of activity  08/22/17 0923 Done   Significant Other Acceptance E,D VU,NR Reviewed benefits of activity  08/22/17 0923 Done               Point: Precautions (Done)    Learning Progress Summary    Learner Readiness Method Response Comment Documented by Status   Patient Acceptance E,D VU,NR Reviewed benefits of activity  08/22/17 0923 Done   Significant Other Acceptance E,D VU,NR Reviewed benefits of activity  08/22/17 0923 Done                      User Key     Initials Effective Dates Name Provider Type Discipline     03/14/16 -  Gerber Steve, PT Physical Therapist PT                PT Recommendation and Plan  Anticipated Discharge Disposition: home with assist  Planned Therapy Interventions: balance training, bed mobility training, gait training, home exercise program, patient/family education, stair training, strengthening, transfer training  PT Frequency: daily  Plan of Care Review  Plan Of Care Reviewed With: patient  Outcome Summary/Follow up Plan: Pt ambulated 200 feet with CGAx2. PT will follow to increase  strength, improve balance and progress functional mobility. Recommend home with assist at discharge          IP PT Goals       08/22/17 0924          Bed Mobility PT LTG    Bed Mobility PT LTG, Date Established 08/22/17  -MJ      Bed Mobility PT LTG, Time to Achieve 5 days  -MJ      Bed Mobility PT LTG, Activity Type supine to sit/sit to supine  -MJ      Bed Mobility PT LTG, [place] Level independent  -MJ      Bed Mobility PT LTG, Date Goal Reviewed 08/22/17  -MJ      Bed Mobility PT LTG, Outcome goal ongoing  -MJ      Transfer Training PT LTG    Transfer Training PT LTG, Date Established 08/22/17  -MJ      Transfer Training PT LTG, Time to Achieve 5 days  -MJ      Transfer Training PT LTG, Activity Type sit to stand/stand to sit  -MJ      Transfer Training PT LTG, [place] Level independent  -MJ      Transfer Training PT  LTG, Date Goal Reviewed 08/22/17  -MJ      Transfer Training PT LTG, Outcome goal ongoing  -MJ      Gait Training PT LTG    Gait Training Goal PT LTG, Date Established 08/22/17  -MJ      Gait Training Goal PT LTG, Time to Achieve 5 days  -MJ      Gait Training Goal PT LTG, [place] Level independent  -MJ      Gait Training Goal PT LTG, Distance to Achieve 500 feet  -MJ      Gait Training Goal PT LTG, Date Goal Reviewed 08/22/17  -MJ      Gait Training Goal PT LTG, Outcome goal ongoing  -MJ      Stair Training PT LTG    Stair Training Goal PT LTG, Date Established 08/22/17  -MJ      Stair Training Goal PT LTG, Time to Achieve 5 days  -MJ      Stair Training Goal PT LTG, Number of Steps 4  -MJ      Stair Training Goal PT LTG, [place] Level supervision required  -MJ      Stair Training Goal PT LTG, Assist Device 1 handrail  -MJ      Stair Training Goal PT LTG, Date Goal Reviewed 08/22/17  -MJ      Stair Training Goal PT LTG, Outcome goal ongoing  -MJ        User Key  (r) = Recorded By, (t) = Taken By, (c) = Cosigned By    Initials Name Provider Type    AISHWARYA Steve, PT Physical  Therapist                Outcome Measures       08/22/17 0822          How much help from another person do you currently need...    Turning from your back to your side while in flat bed without using bedrails? 3  -MJ      Moving from lying on back to sitting on the side of a flat bed without bedrails? 3  -MJ      Moving to and from a bed to a chair (including a wheelchair)? 3  -MJ      Standing up from a chair using your arms (e.g., wheelchair, bedside chair)? 3  -MJ      Climbing 3-5 steps with a railing? 3  -MJ      To walk in hospital room? 3  -MJ      AM-PAC 6 Clicks Score 18  -      Functional Assessment    Outcome Measure Options AM-PAC 6 Clicks Basic Mobility (PT)  -        User Key  (r) = Recorded By, (t) = Taken By, (c) = Cosigned By    Initials Name Provider Type    AISHWARYA Steve PT Physical Therapist           Time Calculation:         PT Charges       08/22/17 0926          Time Calculation    Start Time 0822  -      PT Received On 08/22/17  -      PT Goal Re-Cert Due Date 09/01/17  -      Time Calculation- PT    Total Timed Code Minutes- PT 5 minute(s)  -        User Key  (r) = Recorded By, (t) = Taken By, (c) = Cosigned By    Initials Name Provider Type    AISHWARYA Steve PT Physical Therapist          Therapy Charges for Today     Code Description Service Date Service Provider Modifiers Qty    69398032160 HC PT MOBILITY CURRENT 8/22/2017 Gerber Steve PT GP, CK 1    19217698190 HC PT MOBILITY PROJECTED 8/22/2017 Gerber Steve PT GP, CJ 1    96031155328 HC PT EVAL LOW COMPLEXITY 4 8/22/2017 Gerber Steve PT GP 1          PT G-Codes  PT Professional Judgement Used?: Yes  Outcome Measure Options: AM-PAC 6 Clicks Basic Mobility (PT)  Score: 18  Functional Limitation: Mobility: Walking and moving around  Mobility: Walking and Moving Around Current Status (): At least 40 percent but less than 60 percent impaired, limited or restricted  Mobility: Walking and Moving Around Goal Status  (): At least 20 percent but less than 40 percent impaired, limited or restricted      Gerber Steve, PT  8/22/2017

## 2017-08-22 NOTE — ED NOTES
Obtain medical records [[id number]] (Order [id number])    Sent authorization for medical records to [facility], waiting for the records to be faxed over. Will notify physician when available.      Delia Evangelista  08/21/17 2151

## 2017-08-22 NOTE — PROGRESS NOTES
"Adult Nutrition  Assessment/PES    Patient Name:  Robbie Carmen  YOB: 1945  MRN: [id number]  Admit Date:  8/21/2017    Assessment Date:  8/22/2017        Reason for Assessment       08/22/17 1522    Reason for Assessment    Reason For Assessment/Visit admission assessment    Time Spent (min) 20                Anthropometrics       08/22/17 1523    Anthropometrics    Height 190.5 cm (75\")    Weight 124 kg (274 lb)    Ideal Body Weight (IBW)    Ideal Body Weight (IBW), Male (kg) 90.45    % Ideal Body Weight 137.7    Usual Body Weight (UBW)    Weight Loss --   pt/fam deny that pt has had any wt changes prior to adm.    Body Mass Index (BMI)    BMI (kg/m2) 34.32                  Nutrition Prescription Ordered       08/22/17 1530    Nutrition Prescription PO    Current PO Diet Regular    Common Modifiers Cardiac;Consistent Carbohydrate            Evaluation of Received Nutrient/Fluid Intake       08/22/17 1530    PO Evaluation    Number of Meals 1    % PO Intake 75              Problem/Interventions:        Problem 1       08/22/17 1530    Nutrition Diagnoses Problem 1    Problem 1 No Nutrition Diagnosis at this Time                    Intervention Goal       08/22/17 1530    Intervention Goal    PO Establish PO            Nutrition Intervention       08/22/17 1530    Nutrition Intervention    RD/Tech Action Follow Tx progress              Education/Evaluation       08/22/17 1530    Monitor/Evaluation    Monitor Per protocol        Comments:      Electronically signed by:  Christina Scott MS,RD,LD  08/22/17 3:30 PM  "

## 2017-08-22 NOTE — PLAN OF CARE
Problem: Patient Care Overview (Adult)  Goal: Plan of Care Review  Outcome: Ongoing (interventions implemented as appropriate)    08/22/17 0924   Coping/Psychosocial Response Interventions   Plan Of Care Reviewed With patient   Outcome Evaluation   Outcome Summary/Follow up Plan Pt ambulated 200 feet with CGAx2. PT will follow to increase strength, improve balance and progress functional mobility. Recommend home with assist at discharge         Problem: Inpatient Physical Therapy  Goal: Bed Mobility Goal LTG- PT  Outcome: Ongoing (interventions implemented as appropriate)    08/22/17 0924   Bed Mobility PT LTG   Bed Mobility PT LTG, Date Established 08/22/17   Bed Mobility PT LTG, Time to Achieve 5 days   Bed Mobility PT LTG, Activity Type supine to sit/sit to supine   Bed Mobility PT LTG, [place] Level independent   Bed Mobility PT LTG, Date Goal Reviewed 08/22/17   Bed Mobility PT LTG, Outcome goal ongoing       Goal: Transfer Training Goal 1 LTG- PT  Outcome: Ongoing (interventions implemented as appropriate)    08/22/17 0924   Transfer Training PT LTG   Transfer Training PT LTG, Date Established 08/22/17   Transfer Training PT LTG, Time to Achieve 5 days   Transfer Training PT LTG, Activity Type sit to stand/stand to sit   Transfer Training PT LTG, [place] Level independent   Transfer Training PT LTG, Date Goal Reviewed 08/22/17   Transfer Training PT LTG, Outcome goal ongoing       Goal: Gait Training Goal LTG- PT  Outcome: Ongoing (interventions implemented as appropriate)    08/22/17 0924   Gait Training PT LTG   Gait Training Goal PT LTG, Date Established 08/22/17   Gait Training Goal PT LTG, Time to Achieve 5 days   Gait Training Goal PT LTG, [place] Level independent   Gait Training Goal PT LTG, Distance to Achieve 500 feet   Gait Training Goal PT LTG, Date Goal Reviewed 08/22/17   Gait Training Goal PT LTG, Outcome goal ongoing       Goal: Stair Training Goal LTG- PT  Outcome: Ongoing  (interventions implemented as appropriate)    08/22/17 0924   Stair Training PT LTG   Stair Training Goal PT LTG, Date Established 08/22/17   Stair Training Goal PT LTG, Time to Achieve 5 days   Stair Training Goal PT LTG, Number of Steps 4   Stair Training Goal PT LTG, [place] Level supervision required   Stair Training Goal PT LTG, Assist Device 1 handrail   Stair Training Goal PT LTG, Date Goal Reviewed 08/22/17   Stair Training Goal PT LTG, Outcome goal ongoing

## 2017-08-22 NOTE — H&P
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 He presents today with acute change in mental status. Last known normal Sunday night. Family at bedside state patient has been aphasic and unable to articulate his needs and concerns. Also with some short term memory loss and general disorientation. This is associated with increasingly unsteady gait and two near syncopal episodes. Currently Denies chest pain, fever, cough, congestion, SOB, focal weakness/parasthesias, or visual disturbances. Two weeks ago, patient was sitting in a stool with wheels when he suddenly rolled backward and hit his head on a concrete wall. No LOC, but did have a headache for a week before he was convinced to be evaluated. States CT scan at [facility] demonstrated a brain bleed and he was subsequently admitted to [facility] in [place] for 3 days where he was followed by neurosurgery with serial head CT's. Today was the first day since last Wednesday that the patient underwent CT to evaluate progression of bleed. Records reviewed indicate that outpatient visit for this was unremarkable and demonstrated an improvement with bleed. He now presents to [facility] with acute onset of post-concussive like symptoms for further evaluation and treatment. Of note, patient has not taken ASA since last Monday.    Emergency Department Evaluation: Vitals stable. Chemistry and hematology favorable. urianlysis negative. CXR without acute changes. CT head demonstrates;  Subdural fluid or blood hypodense to brain is seen to the left of the falx in the frontal and parietal occipital  regions extending down to the level of the tentorium, maximum width approximately 9 mm along the falx. There is a 6.7 mm left to right midline shift.      Review of Systems   Constitutional: Negative for fatigue and fever.   HENT: Negative for congestion and trouble swallowing.    Eyes: Negative for photophobia and visual disturbance.   Respiratory: Negative for shortness of breath.    Cardiovascular: Negative for chest pain and leg swelling.   Gastrointestinal: Negative for abdominal pain, diarrhea, nausea and vomiting.   Endocrine: Negative for cold intolerance and heat intolerance.   Genitourinary: Negative for dysuria and flank pain.   Musculoskeletal: Negative for arthralgias and back pain.   Skin: Negative for pallor and rash.   Allergic/Immunologic: Negative for immunocompromised state.   Neurological: Positive for dizziness, syncope, speech difficulty, light-headedness and headaches.   Hematological: Negative for adenopathy.   Psychiatric/Behavioral: Positive for confusion. Negative for agitation.      Otherwise complete ROS performed and negative except as mentioned in the HPI.    Past Medical History:   Diagnosis Date   • Cancer     Larnyx CA   • COPD (chronic obstructive pulmonary disease)    • Diabetes mellitus    • H/O hernia repair     Ruptured hernia repair   • History of cholecystectomy    • HTN (hypertension)    • Hypercholesterolemia    • Hypothyroidism    • IHD (ischemic heart disease)      s/p stenting   • Intracranial hemorrhage    • Rheumatic fever     as child   • Sleep apnea     on CPAP.   • Spinal cord cysts     removed   • Thyroid cyst     removed       Past Surgical History:   Procedure Laterality Date   • CARDIAC CATHETERIZATION  08/17/1999    Cath- 65% RCA.    • CARDIAC CATHETERIZATION  11/30/2004    Cath- 75% RCA. 3.5 x 32 mm Taxus Stent.    • CARDIAC CATHETERIZATION  11/17/2005    Cath- 75% in-Stent Stenosis. 3.5 x 28 mm Cypher Stent   • CARDIAC CATHETERIZATION  05/24/2011    Cath-  "Patent Stent in RCA. PA- 44 mmHg    • CARDIOVASCULAR STRESS TEST  06/04/2007     Stress- 4 Min 85% THR. Inferior Infarct .     • CARDIOVASCULAR STRESS TEST  07/30/2009    Stress- 4 Min, 36 sec. 97% THR. BP- 182/92. Small lateral Ischemia.    • CARDIOVASCULAR STRESS TEST  05/10/2011    Stress- 3 min 30 sec. 86% THR. 170/82. Negative.    • CARDIOVASCULAR STRESS TEST  05/24/2016    (Mod) 7 Min, 14 Secs. 91% THR. BP- 178/88. Negative   • CHOLECYSTECTOMY     • CORONARY STENT PLACEMENT     • ECHO - CONVERTED  06/04/2007    Echo- EF 65%.    • ECHO - CONVERTED  07/30/2009    Echo- EF >60%, Mild MR.    • ECHO - CONVERTED  04/27/2010    S. Echo- 5 Min, 49 Sec. 88% THR. BP_ 178/80. Negative.    • OTHER SURGICAL HISTORY  05/11/2012    Carotid US- Normal.    • VOCAL FOLD LESION EXCISION         Family History   Problem Relation Age of Onset   • Diabetes Mother        Social History     Social History   • Marital status:      Spouse name: N/A   • Number of children: N/A   • Years of education: N/A     Occupational History   • Not on file.     Social History Main Topics   • Smoking status: Former Smoker     Quit date: 1992   • Smokeless tobacco: Current User     Types: Snuff   • Alcohol use No   • Drug use: No   • Sexual activity: Not on file     Other Topics Concern   • Not on file     Social History Narrative   • No narrative on file       Medications:    (Not in a hospital admission)    Allergies:  No Known Allergies      Objective     Physical Exam:  Vital Signs: /80  Pulse 74  Temp 98.1 °F (36.7 °C) (Oral)   Resp 18  Ht 73\" (185.4 cm)  Wt 300 lb (136 kg)  SpO2 95%  BMI 39.58 kg/m2  Physical Exam  Temp:  [98.1 °F (36.7 °C)] 98.1 °F (36.7 °C)  Heart Rate:  [67-89] 74  Resp:  [18] 18  BP: (138-178)/() 148/80  Constitutional: no acute distress, awake, alert, flat affect with delayed response  Eyes: PERRLA, sclerae anicteric, no conjunctival injection  Neck: supple, no thyromegaly, trachea " midline  Respiratory: Clear to auscultation bilaterally, nonlabored respirations, breath sounds diminished  Cardiovascular: RRR, no murmurs, rubs, or gallops, palpable pedal pulses bilaterally  Gastrointestinal: Positive bowel sounds, soft, nontender, nondistended morbidly obese   Musculoskeletal: No bilateral ankle edema, no clubbing or cyanosis to bilateral lower extremities  Psychiatric: oriented x 3, appropriate affect, cooperative  Neurologic: Strength symmetric in all extremities, Cranial Nerves grossly intact to confrontation         Results Reviewed:    Results from last 7 days  Lab Units 08/21/17 2128   WBC 10*3/mm3 10.85*   HEMOGLOBIN g/dL 16.2   PLATELETS 10*3/mm3 165       Results from last 7 days  Lab Units 08/21/17 2128   SODIUM mmol/L 136   POTASSIUM mmol/L 3.6   CO2 mmol/L 29.0   CREATININE mg/dL 1.10   GLUCOSE mg/dL 129*   CALCIUM mg/dL 9.8       I have personally reviewed and interpreted available lab data, radiology studies and ECG obtained at time of admission.     Assessment / Plan     Problem List:   Hospital Problem List     * (Principal)Subdural hematoma with post-concussive syndrome    Essential hypertension    COPD (chronic obstructive pulmonary disease)    Type 2 diabetes mellitus    Coronary artery disease involving native coronary artery of native heart without angina pectoris    Hypercholesteremia    Acquired hypothyroidism    Sleep apnea    Post-concussion syndrome            Plan:  1. Subdural hematoma with Post-concussive Syndrome:  - vitals stable. Demonstrated on CT. Corroborated by MR. Obtain records for comparison although records reviewed indicate improvement.  - neuro checks Q4 with close monitor on telemetry  - PT/OT treat and eval  - repeat head CT in am  - am labs  - inpatient consult to neurosurgery with acute changes    2. Hypertension:  - stable and improved from initial evaluation.  - continue home meds    3. COPD:  - stable. On 2L oxygen nightly  - pulmonary toilet as  needed with duo nebs, mucolytics, antitussives  - cpap for EDDIE    4. Dm2:  - diet controlled. Monitor.  - scheduled accu checks with sliding scale insulin    Patient stable for admission to TELEMETRY for observation. Labs and vitals routine per nursing.    DVT prophylaxis: SCD/TEDS  Code Status: full  Admission Status: Patient will be admitted to OBSERVATION status, however if further evaluation or treatment plans warrant, status may change.  Based upon current information, I predict patient's care encounter to be less than or equal to 2 midnights.     Latia Marie PA-C 08/22/17 1:47 AM      Brief Attending Note       I have seen and examined the patient, performing an independent face-to-face diagnostic evaluation with plan of care reviewed and developed with the advanced practice clinician (APC).      Brief Summary Statement/HPI:   71 year old male, recently discharged from [facility], after intracranial bleed. He was followed by neurosurgery and with serial imaging there. Patient's imaging had been improving and he was discharged home. Had repeat CT scan today as outpatient which again was improving, but at that time patient had an event where he became aphasic and confused. MRI here confirmed presence of intracranial bleed with 6mm midline shift which is stable from his prior films per report. Currently the patient seems back to baseline, only with slow mentation.    Attending Physical Exam:  Gen-no acute distress  HEENT-atraumatic, normocephalic, PERRLA, EOMI, moist mucous membranes present  CV-RRR, S1 S2 normal, no m/r/g  Resp-CTAB, no wheezes or rales; normal respiratory effort  Abd-soft, NT, ND, +BS; no rebound or guarding  Ext-no edema or clubbing  Skin-warm, dry, no rashes or lesions noted  Neuro-A&Ox3, CN II-XII grossly intact; equal strength in upper and lower extremities; no focal deficits  Psych-appropriate mood and behavior    MRI brain personally reviewed with small subacute bleeds. Agree with  interpretation.    Brief Assessment/Plan :    71 year old male brought in from home with aphasia and AMS. It is noted that upon arrival his blood pressure was 170 systolic which could have contributed to his presentation, especially in the setting of his recent subdural. He seems back to baseline as does his blood pressure. Plan to repeat CT head in am and if bleed remains stable in size discharge home in am.    See above for further detailed assessment and plan developed with APC which I have reviewed and/or edited.    I believe this patient meets OBSERVATION status, however if further evaluation or treatment plans warrant, status may change.  Based upon current information, I predict patient's care encounter to be less than or equal to 2 midnights.      Debbie Dooley II, DO  08/22/17  3:04 AM

## 2017-08-22 NOTE — PLAN OF CARE
Problem: Patient Care Overview (Adult)  Goal: Plan of Care Review  Outcome: Ongoing (interventions implemented as appropriate)    08/22/17 0413   Coping/Psychosocial Response Interventions   Plan Of Care Reviewed With patient;spouse   Patient Care Overview   Progress no change   Outcome Evaluation   Outcome Summary/Follow up Plan Pt admitted from ED for post concussion SDH. Pt alert. cooperative, pleasant. Ambulates with standby assist. Wife at bedside. VSS

## 2017-08-22 NOTE — ED PROVIDER NOTES
Subjective   HPI Comments: Robbie Carmen is a 71 y.o.male who presents to the ED with his family who c/o altered mental status. His family reports that he has had difficulty putting thoughts together since this morning. The family states that he fell off a stool 2 weeks ago hitting his head on a concrete wall. The patient was convinced to go to the doctor by family one week ago and was seen in [place] where they did a CT scan and found a brain bleed. He was admitted and then discharged 2 days later. At 10 this morning, he went to the doctor for a repeat CT scan to checkup on his brain bleed where he was tachycardic, diaphoretic, and had trouble expressing thoughts. However, they report that his bleed was slightly improved. Earlier today, the family reported the patient fell twice without making impact with the ground and had near syncopal episodes.After this, they tried to make an appointment with their primary care physician but were unable to and were referred to the ED. Since the incident the patient has had a HA. He rates the pain as an 8/10. He also c/o slight nausea but denies CP, SOA, abdominal pain, or any other complaints at this time. The patient's wife states that he has a history of heart problems, had a stent placed in 2004 and then replaced in 2005, a history of DM, as well as HTN. They note that he was previously on aspirin, but has stopped taking them since Monday.           Patient is a 71 y.o. male presenting with altered mental status.   History provided by:  Patient, relative and spouse  History limited by:  Mental status change  Altered Mental Status   Presenting symptoms: behavior changes, memory loss and partial responsiveness (Unable to put thoughts together.)    Severity:  Moderate  Most recent episode:  Today  Episode history:  Multiple  Timing:  Constant  Progression:  Unchanged  Chronicity:  New  Context: head injury (2 weeks ago, resulted in brain bleed)    Recent head injury:  Over  24 hours ago  Associated symptoms: headaches and nausea    Associated symptoms: no abdominal pain and no difficulty breathing        Review of Systems   Unable to perform ROS: Mental status change   Respiratory: Negative for shortness of breath.    Cardiovascular: Negative for chest pain.   Gastrointestinal: Positive for nausea. Negative for abdominal pain.   Neurological: Positive for syncope (Near), speech difficulty and headaches.   Psychiatric/Behavioral: Positive for memory loss.       Past Medical History:   Diagnosis Date   • Cancer     Larnyx CA   • COPD (chronic obstructive pulmonary disease)    • Diabetes mellitus    • H/O hernia repair     Ruptured hernia repair   • History of cholecystectomy    • HTN (hypertension)    • Hypercholesterolemia    • Hypothyroidism    • IHD (ischemic heart disease)      s/p stenting   • Intracranial hemorrhage    • Rheumatic fever     as child   • Sleep apnea     on CPAP.   • Spinal cord cysts     removed   • Thyroid cyst     removed       No Known Allergies    Past Surgical History:   Procedure Laterality Date   • CARDIAC CATHETERIZATION  08/17/1999    Cath- 65% RCA.    • CARDIAC CATHETERIZATION  11/30/2004    Cath- 75% RCA. 3.5 x 32 mm Taxus Stent.    • CARDIAC CATHETERIZATION  11/17/2005    Cath- 75% in-Stent Stenosis. 3.5 x 28 mm Cypher Stent   • CARDIAC CATHETERIZATION  05/24/2011    Cath- Patent Stent in RCA. PA- 44 mmHg    • CARDIOVASCULAR STRESS TEST  06/04/2007     Stress- 4 Min 85% THR. Inferior Infarct .     • CARDIOVASCULAR STRESS TEST  07/30/2009    Stress- 4 Min, 36 sec. 97% THR. BP- 182/92. Small lateral Ischemia.    • CARDIOVASCULAR STRESS TEST  05/10/2011    Stress- 3 min 30 sec. 86% THR. 170/82. Negative.    • CARDIOVASCULAR STRESS TEST  05/24/2016    (Mod) 7 Min, 14 Secs. 91% THR. BP- 178/88. Negative   • CHOLECYSTECTOMY     • CORONARY STENT PLACEMENT     • ECHO - CONVERTED  06/04/2007    Echo- EF 65%.    • ECHO - CONVERTED  07/30/2009    Echo- EF >60%,  Mild MR.    • ECHO - CONVERTED  04/27/2010    S. Echo- 5 Min, 49 Sec. 88% THR. BP_ 178/80. Negative.    • OTHER SURGICAL HISTORY  05/11/2012    Carotid US- Normal.    • VOCAL FOLD LESION EXCISION         Family History   Problem Relation Age of Onset   • Diabetes Mother        Social History     Social History   • Marital status:      Spouse name: N/A   • Number of children: N/A   • Years of education: N/A     Social History Main Topics   • Smoking status: Former Smoker     Quit date: 1992   • Smokeless tobacco: Current User     Types: Snuff   • Alcohol use No   • Drug use: No   • Sexual activity: Not Asked     Other Topics Concern   • None     Social History Narrative   • None         Objective   Physical Exam   Constitutional: He appears well-developed and well-nourished. No distress.   HENT:   Head: Normocephalic and atraumatic.   Nose: Nose normal.   Eyes: Conjunctivae are normal. No scleral icterus.   Neck: Normal range of motion. Neck supple.   Cardiovascular: Normal rate, regular rhythm, normal heart sounds and intact distal pulses.    No murmur heard.  Pulmonary/Chest: Effort normal and breath sounds normal. No respiratory distress.   Abdominal: Soft. Bowel sounds are normal. There is no tenderness.   Musculoskeletal: Normal range of motion. He exhibits no edema.   Neurological: He is alert.   Short term memory deficits. Remote memory intact. Expressive aphasia. Difficulty putting thoughts together. No motor sensory deficits. NIH=1   Skin: Skin is warm and dry.   Nursing note and vitals reviewed.      Critical Care  Performed by: TINA COTTER  Authorized by: TINA COTTER   Total critical care time: 45 minutes  Critical care time was exclusive of separately billable procedures and treating other patients.  Critical care was necessary to treat or prevent imminent or life-threatening deterioration of the following conditions: CNS failure or compromise.  Critical care was time spent  personally by me on the following activities: development of treatment plan with patient or surrogate, discussions with consultants, evaluation of patient's response to treatment, ordering and performing treatments and interventions, pulse oximetry, examination of patient, ordering and review of laboratory studies, re-evaluation of patient's condition, obtaining history from patient or surrogate, ordering and review of radiographic studies and review of old charts.               ED Course  ED Course   Comment By Time   Dr. Contreras discussed CT results with radiologist. Fan Soria 08/21 2233   Patient has acute altered mental status with known prior history for traumatic brain injury with intracerebral hemorrhage.  At this point his symptoms are concerning for the possibility of another insult.  Thus, we will obtain a scan of the head and to evaluate the status of intracerebral hemorrhage and also an MRI to evaluate for any evidence of inflammation or ischemia. David Contreras MD 08/21 2242   The patient is stable.  Findings plan discussed.  This point I feel the patient will benefit from admission to the hospital secondary to the acute changes in his mental status and her syncopal episode.  I discussed the case with Dr. Dooley who agrees with the plan to admit. David Contreras MD 08/22 0028                     MDM  Number of Diagnoses or Management Options  Diagnosis management comments: ECG/EMG Results (last 24 hours)     Procedure Component Value Units Date/Time    ECG 12 Lead ([id number]) Collected:  08/21/17 2104     Updated:  08/21/17 2147    Narrative:       Test Reason : triage  Blood Pressure : **/** mmHG  Vent. Rate : 079 BPM     Atrial Rate : 079 BPM     P-R Int : 186 ms          QRS Dur : 094 ms      QT Int : 400 ms       P-R-T Axes : 045 077 054 degrees     QTc Int : 458 ms    Normal sinus rhythm  Low voltage QRS  Borderline ECG  No previous ECGs available  Confirmed by DAVID CONTRERAS MD ([id number])  on 8/21/2017 9:47:05 PM    Referred By:  EDMD           Confirmed By:TINA CONTRERAS MD    ECG 12 Lead ([id number]) Collected:  08/21/17 2344     Updated:  08/22/17 0036    Narrative:       Test Reason : 2ND SET  Blood Pressure : **/** mmHG  Vent. Rate : 070 BPM     Atrial Rate : 070 BPM     P-R Int : 192 ms          QRS Dur : 088 ms      QT Int : 392 ms       P-R-T Axes : 033 041 056 degrees     QTc Int : 423 ms    Sinus rhythm with fusion complexes  Low voltage QRS  Borderline ECG  When compared with ECG of 21-AUG-2017 21:04,  fusion complexes are now present  Confirmed by TINA CONTRERAS MD ([id number]) on 8/22/2017 12:36:28 AM    Referred By:  CYNDEE           Confirmed By:TINA CONTRERAS MD             Amount and/or Complexity of Data Reviewed  Clinical lab tests: reviewed  Tests in the radiology section of CPT®: reviewed  Decide to obtain previous medical records or to obtain history from someone other than the patient: yes  Obtain history from someone other than the patient: yes  Review and summarize past medical records: yes  Discuss the patient with other providers: yes  Independent visualization of images, tracings, or specimens: yes    Critical Care  Total time providing critical care: 30-74 minutes      Final diagnoses:   Post-concussion syndrome   Near syncope   Essential hypertension   Hypercholesteremia   SDH (subdural hematoma)   Expressive aphasia   Short-term memory loss       Documentation assistance provided by jaime ATWOOD.  Information recorded by the scribe was done at my direction and has been verified and validated by me.     Fan Atwood  08/21/17 2145       Tina Contreras MD  08/22/17 0037

## 2017-08-22 NOTE — PLAN OF CARE
Problem: Patient Care Overview (Adult)  Goal: Plan of Care Review  Outcome: Ongoing (interventions implemented as appropriate)    08/22/17 0931   Coping/Psychosocial Response Interventions   Plan Of Care Reviewed With patient   Outcome Evaluation   Outcome Summary/Follow up Plan OT lucita complete. Pt with mild cognitive impairment, mild peripheral vision impairment on R , and slightly weaker on R as compared to left. Pt CGA with bed mobiltiy and transfers, and appeared slightly more cognitively delayed after activity. OT will follow to advance pt toward PLOF with self care. recommend home with assist.          Problem: Inpatient Occupational Therapy  Goal: Transfer Training Goal 1 LTG- OT  Outcome: Ongoing (interventions implemented as appropriate)    08/22/17 0931   Transfer Training OT LTG   Transfer Training OT LTG, Date Established 08/22/17   Transfer Training OT LTG, Time to Achieve by discharge   Transfer Training OT LTG, Activity Type toilet;bed to chair /chair to bed   Transfer Training OT LTG, [place] Level supervision required       Goal: Strength Goal LTG- OT  Outcome: Ongoing (interventions implemented as appropriate)    08/22/17 0931   Strength OT LTG   Strength Goal OT LTG, Date Established 08/22/17   Strength Goal OT LTG, Time to Achieve by discharge   Strength Goal OT LTG, Measure to Achieve pt will complete 2 sets x 10 BUE strengthening HEP as needed to support ADLs       Goal: Dynamic Standing Balance Goal LTG-OT  Outcome: Ongoing (interventions implemented as appropriate)    08/22/17 0931   Dynamic Standing Balance OT LTG   Dynamic Standing Balance OT LTG, Date Established 08/22/17   Dynamic Standing Balance OT LTG, Time to Achieve by discharge   Dynamic Standing Balance OT LTG, [place] Level supervision required       Goal: Safety Awareness Goal LTG- OT  Outcome: Ongoing (interventions implemented as appropriate)    08/22/17 0931   Safety Awareness OT LTG   Safety Awareness OT LTG, Date  Established 08/22/17   Safety Awareness OT LTG, Time to Achieve by discharge   Safety Awareness OT LTG, Activity Type good safety awareness;with ADL's

## 2017-08-22 NOTE — THERAPY EVALUATION
Acute Care - Occupational Therapy Initial Evaluation  [facility]     Patient Name: Robbie Carmen  : 1945  MRN: [id number]  Today's Date: 2017  Onset of Illness/Injury or Date of Surgery Date: 17  Date of Referral to OT: 17  Referring Physician: ZAFAR Marie    Admit Date: 2017       ICD-10-CM ICD-9-CM   1. Post-concussion syndrome F07.81 310.2   2. Near syncope R55 780.2   3. Essential hypertension I10 401.9   4. Hypercholesteremia E78.00 272.0   5. SDH (subdural hematoma) I62.00 432.1   6. Expressive aphasia R47.01 784.3   7. Short-term memory loss R41.3 780.93   8. Impaired functional mobility, balance, gait, and endurance Z74.09 V49.89   9. Impaired mobility and ADLs Z74.09 799.89     Patient Active Problem List   Diagnosis   • Coronary artery disease involving native coronary artery of native heart without angina pectoris   • Essential hypertension   • Hypercholesteremia   • Acquired hypothyroidism   • Metabolic syndrome   • COPD (chronic obstructive pulmonary disease)   • Type 2 diabetes mellitus   • Sleep apnea   • Subdural hematoma with post-concussive syndrome   • Post-concussion syndrome     Past Medical History:   Diagnosis Date   • Cancer     Larnyx CA   • COPD (chronic obstructive pulmonary disease)    • Diabetes mellitus    • H/O hernia repair     Ruptured hernia repair   • History of cholecystectomy    • HTN (hypertension)    • Hypercholesterolemia    • Hypothyroidism    • IHD (ischemic heart disease)      s/p stenting   • Intracranial hemorrhage    • Rheumatic fever     as child   • Sleep apnea     on CPAP.   • Spinal cord cysts     removed   • Thyroid cyst     removed     Past Surgical History:   Procedure Laterality Date   • CARDIAC CATHETERIZATION  1999    Cath- 65% RCA.    • CARDIAC CATHETERIZATION  2004    Cath- 75% RCA. 3.5 x 32 mm Taxus Stent.    • CARDIAC CATHETERIZATION  2005    Cath- 75% in-Stent Stenosis. 3.5 x 28 mm Cypher Stent   • CARDIAC  CATHETERIZATION  05/24/2011    Cath- Patent Stent in RCA. PA- 44 mmHg    • CARDIOVASCULAR STRESS TEST  06/04/2007     Stress- 4 Min 85% THR. Inferior Infarct .     • CARDIOVASCULAR STRESS TEST  07/30/2009    Stress- 4 Min, 36 sec. 97% THR. BP- 182/92. Small lateral Ischemia.    • CARDIOVASCULAR STRESS TEST  05/10/2011    Stress- 3 min 30 sec. 86% THR. 170/82. Negative.    • CARDIOVASCULAR STRESS TEST  05/24/2016    (Mod) 7 Min, 14 Secs. 91% THR. BP- 178/88. Negative   • CHOLECYSTECTOMY     • CORONARY STENT PLACEMENT     • ECHO - CONVERTED  06/04/2007    Echo- EF 65%.    • ECHO - CONVERTED  07/30/2009    Echo- EF >60%, Mild MR.    • ECHO - CONVERTED  04/27/2010    S. Echo- 5 Min, 49 Sec. 88% THR. BP_ 178/80. Negative.    • OTHER SURGICAL HISTORY  05/11/2012    Carotid US- Normal.    • VOCAL FOLD LESION EXCISION            OT ASSESSMENT FLOWSHEET (last 72 hours)      OT Evaluation       08/22/17 0827 08/22/17 0822 08/22/17 0213          Rehab Evaluation    Document Type evaluation  - evaluation  -       Subjective Information agree to therapy;no complaints  - agree to therapy;no complaints  -       Patient Effort, Rehab Treatment good  - good  -       Symptoms Noted During/After Treatment fatigue  - fatigue  -       General Information    Patient Profile Review yes  -AC yes  -       Onset of Illness/Injury or Date of Surgery Date 08/21/17  - 08/21/17  -       Referring Physician ZAFAR Marie  -ZAFAR Mckinnon  -AISHWARYA       General Observations Pt supine in bed.  Wife present in room.  - Pt supine in bed, IV heplocked, tele. Wife present  -       Pertinent History Of Current Problem Pt with recent fall hitting head on concreate and sustianing post concussion SDH.  Pt now presents with AMS, memory loss, unsteady gait with 2 recent syncopal episodes.   - Pt presents with AMS, unsteady gait, syncope and ST memory loss. Pt fell 2 weeks ago from stool hitting head on concrete wall, found to have brain  bleed. CT since then has shown shrinkage in bleed  -MJ       Precautions/Limitations fall precautions   exit alarm   -AC fall precautions;other (see comments)   exit alarm  -MJ       Prior Level of Function independent:;all household mobility;community mobility;ADL's;driving  - independent:;all household mobility;community mobility;gait;transfer;bed mobility  -MJ       Equipment Currently Used at Home none  -AC none  -MJ none  -KB      Plans/Goals Discussed With patient and family;agreed upon  -AC patient and family;agreed upon  -       Risks Reviewed patient and family:;LOB  -AC patient and family:;LOB;increased discomfort  -       Benefits Reviewed patient and family:;improve function;increase independence;increase strength;increase balance;increase knowledge  - patient and family:;improve function;increase independence;increase strength;increase balance;decrease pain  -MJ       Barriers to Rehab none identified  -AC none identified  -MJ       Living Environment    Lives With spouse  -AC spouse  -MJ spouse  -KB      Living Arrangements house  -AC house  -MJ house  -KB      Home Accessibility stairs to enter home;tub/shower is not walk in  -AC stairs to enter home;tub/shower is not walk in  -MJ bed and bath on same level;stairs to enter home  -KB      Number of Stairs to Enter Home 4  -AC 4  -MJ       Stair Railings at Home outside, present on left side  -AC outside, present on left side  -MJ outside, present on left side  -KB      Type of Financial/Environmental Concern   none  -KB      Transportation Available   car;family or friend will provide  -KB      Clinical Impression    Date of Referral to OT 08/22/17  -        OT Diagnosis ADL decline  -        Patient/Family Goals Statement Pt would like to return to [facility] with ADLs  -        Criteria for Skilled Therapeutic Interventions Met yes;treatment indicated  -        Rehab Potential good, to achieve stated therapy goals  -        Therapy  Frequency daily  -AC        Anticipated Discharge Disposition home with assist  -AC        Functional Level Prior    Ambulation   0-->independent  -KB      Transferring   0-->independent  -KB      Toileting   0-->independent  -KB      Bathing   0-->independent  -KB      Dressing   0-->independent  -KB      Eating   0-->independent  -KB      Communication   0-->understands/communicates without difficulty  -KB      Swallowing   0-->swallows foods/liquids without difficulty  -KB      Vital Signs    Pre Systolic BP Rehab 155  -  -MJ       Pre Treatment Diastolic BP 97  -  -MJ       Intra Systolic BP Rehab 143  -AC        Intra Treatment Diastolic BP 23  -AC        Post Systolic BP Rehab 161  -  -MJ       Post Treatment Diastolic BP 93  -AC 93  -MJ       Pretreatment Heart Rate (beats/min) 73  -AC        Posttreatment Heart Rate (beats/min) 71  -AC 81  -MJ       Pre SpO2 (%) 93  -AC        O2 Delivery Pre Treatment room air  -AC        Post SpO2 (%) 97  -AC 93  -MJ       O2 Delivery Post Treatment room air  -AC room air  -MJ       Pre Patient Position  Supine  -MJ       Post Patient Position  Sitting  -MJ       Pain Assessment    Pain Assessment No/denies pain  -AC No/denies pain  -MJ       Vision Assessment/Intervention    Vision Comment mild peripheral impairment R   -AC        Cognitive Assessment/Intervention    Current Cognitive/Communication Assessment --   delay in response time after ambulating  -AC impaired   intermittent confusion  -MJ       Orientation Status oriented to;person;place   oriented to year, but not month  -AC oriented to;person;place;time;required verbal cueing (specifiy in comments)   Cues for month, knew year  -       Follows Commands/Answers Questions 100% of the time;needs cueing  - 100% of the time;able to follow single-step instructions;needs cueing;needs repetition  -MJ       Personal Safety mild impairment  -AC mild impairment  -MJ       Personal Safety Interventions  fall prevention program maintained;gait belt;nonskid shoes/slippers when out of bed  -AC        ROM (Range of Motion)    General ROM no range of motion deficits identified  -AC no range of motion deficits identified  -MJ       MMT (Manual Muscle Testing)    General MMT Assessment upper extremity strength deficits identified   functional, but right slightly weaker than L   -AC lower extremity strength deficits identified  -MJ       General MMT Assessment Detail R 4/5; L 5/5  -AC        Bed Mobility, Assessment/Treatment    Bed Mobility, Assistive Device bed rails;head of bed elevated  -AC bed rails;head of bed elevated  -MJ       Bed Mob, Supine to Sit, [place] contact guard assist  -AC contact guard assist;verbal cues required  -MJ       Bed Mob, Sit to Supine, [place]  not tested   Pt UIC  -MJ       Bed Mobility, Impairments strength decreased  -AC strength decreased  -MJ       Bed Mobility, Comment increased time and effort required  -AC Verbal cues to move LEs off EOB and to use UEs to push trunk to sitting. Increased time and effort to perform  -MJ       Transfer Assessment/Treatment    Transfers, Sit-Stand [place] verbal cues required;contact guard assist;2 person assist required  -AC contact guard assist;verbal cues required  -MJ       Transfers, Stand-Sit [place] verbal cues required;contact guard assist;2 person assist required  -AC contact guard assist;verbal cues required  -MJ       Transfer, Safety Issues  step length decreased  -MJ       Transfer, Impairments strength decreased;impaired balance  -AC strength decreased;impaired balance  -MJ       Transfer, Comment  Verbal cues for correct hand placement. Pt denies dizziness in standing  -MJ       Functional Mobility    Functional Mobility- Ind. Level verbal cues required;contact guard assist;2 person assist required  -AC        Functional Mobility-Distance (Feet) 200  -AC        Functional Mobility- Comment followed with chair   -AC         Lower Body Dressing Assessment/Training    LB Dressing Assess/Train, Comment supervision to pull socks up  -        Motor Skills/Interventions    Additional Documentation Gross Motor Coordination Training (Group)  -        Gross Motor Coordination Training    Gross Motor Skill, Impairments Detail WNL  -AC        Sensory Assessment/Intervention    Light Touch  LLE;RLE  -MJ       LLE Light Touch  WNL  -MJ       RLE Light Touch  WNL  -MJ       General Therapy Interventions    Planned Therapy Interventions ADL retraining;balance training;bed mobility training;strengthening;transfer training  -        Positioning and Restraints    Pre-Treatment Position in bed  -AC in bed  -MJ       Post Treatment Position chair  -AC chair  -MJ       In Chair reclined;call light within reach;encouraged to call for assist;exit alarm on;with family/caregiver  -AC notified nsg;reclined;call light within reach;encouraged to call for assist;exit alarm on;with family/caregiver  -MJ       Lower Extremity    Lower Ext Manual Muscle Testing Detail  R LE 4/5; L LE 4+/5  -MJ         User Key  (r) = Recorded By, (t) = Taken By, (c) = Cosigned By    Initials Name Effective Dates    AC Kenya Carpio, OT 06/23/15 -     AISHWARYA Steve, PT 03/14/16 -     IJEOMA Fonseca, RN 06/16/16 -            Occupational Therapy Education     Title: PT OT SLP Therapies (Active)     Topic: Occupational Therapy (Active)     Point: ADL training (Done)    Description: Instruct learner(s) on proper safety adaptation and remediation techniques during self care or transfers.   Instruct in proper use of assistive devices.    Learning Progress Summary    Learner Readiness Method Response Comment Documented by Status   Patient Acceptance E VU Benefits of activity, role of OT  08/22/17 0930 Done   Family Acceptance E VU Benefits of activity, role of OT  08/22/17 0930 Done                      User Key     Initials Effective Dates Name Provider Type Discipline      06/23/15 -  Kenya Carpio, OT Occupational Therapist OT                  OT Recommendation and Plan  Anticipated Discharge Disposition: home with assist  Planned Therapy Interventions: ADL retraining, balance training, bed mobility training, strengthening, transfer training  Therapy Frequency: daily  Plan of Care Review  Plan Of Care Reviewed With: patient  Outcome Summary/Follow up Plan: OT lucita complete.  Pt with mild cognitive impairment, mild peripheral vision impairment on R , and slightly weaker on R as compared to left.  Pt  CGA with bed mobiltiy and transfers, and appeared slightly more cognitively delayed after activity. OT will follow to advance pt toward PLOF with self care.  recommend home with assist.           OT Goals       08/22/17 0931          Transfer Training OT LTG    Transfer Training OT LTG, Date Established 08/22/17  -      Transfer Training OT LTG, Time to Achieve by discharge  -AC      Transfer Training OT LTG, Activity Type toilet;bed to chair /chair to bed  -AC      Transfer Training OT LTG, [place] Level supervision required  -AC      Strength OT LTG    Strength Goal OT LTG, Date Established 08/22/17  -AC      Strength Goal OT LTG, Time to Achieve by discharge  -AC      Strength Goal OT LTG, Measure to Achieve pt will complete 2 sets x 10 BUE strengthening HEP as needed to support ADLs  -AC      Dynamic Standing Balance OT LTG    Dynamic Standing Balance OT LTG, Date Established 08/22/17  -AC      Dynamic Standing Balance OT LTG, Time to Achieve by discharge  -AC      Dynamic Standing Balance OT LTG, [place] Level supervision required  -AC      Safety Awareness OT LTG    Safety Awareness OT LTG, Date Established 08/22/17  -AC      Safety Awareness OT LTG, Time to Achieve by discharge  -AC      Safety Awareness OT LTG, Activity Type good safety awareness;with ADL's  -AC        User Key  (r) = Recorded By, (t) = Taken By, (c) = Cosigned By    Initials Name Provider Type     NETTA Carpio OT Occupational Therapist                Outcome Measures       08/22/17 0827 08/22/17 0822       How much help from another person do you currently need...    Turning from your back to your side while in flat bed without using bedrails?  3  -MJ     Moving from lying on back to sitting on the side of a flat bed without bedrails?  3  -MJ     Moving to and from a bed to a chair (including a wheelchair)?  3  -MJ     Standing up from a chair using your arms (e.g., wheelchair, bedside chair)?  3  -MJ     Climbing 3-5 steps with a railing?  3  -MJ     To walk in hospital room?  3  -MJ     AM-PAC 6 Clicks Score  18  -MJ     How much help from another is currently needed...    Putting on and taking off regular lower body clothing? 3  -AC      Bathing (including washing, rinsing, and drying) 3  -AC      Toileting (which includes using toilet bed pan or urinal) 3  -AC      Putting on and taking off regular upper body clothing 4  -AC      Taking care of personal grooming (such as brushing teeth) 4  -AC      Eating meals 4  -AC      Score 21  -AC      Modified [place] Scale    Modified [place] Scale 2 - Slight disability.  Unable to carry out all previous activities but able to look after own affairs without assistance.  -AC 2 - Slight disability.  Unable to carry out all previous activities but able to look after own affairs without assistance.  -MJ     Functional Assessment    Outcome Measure Options Modified [place]  -AC AM-PAC 6 Clicks Basic Mobility (PT);Modified [place]  -MJ       User Key  (r) = Recorded By, (t) = Taken By, (c) = Cosigned By    Initials Name Provider Type    NETTA Carpio OT Occupational Therapist    AISHWARYA Steve, PT Physical Therapist          Time Calculation:   OT Start Time: 0827    Therapy Charges for Today     Code Description Service Date Service Provider Modifiers Qty    [id number]  OT EVAL LOW COMPLEXITY 4 8/22/2017 Kenya Carpio OT GO 1    [id number]  OT SELFCARE  CURRENT 8/22/2017 Kenya Carpio OT GO, CJ 1    [id number]  OT SELFCARE PROJECTED 8/22/2017 Kenya Carpio OT GO, CI 1          OT G-codes  Functional Assessment Tool Used: impact 6 clicks  Score: 21  Self Care Current Status (): At least 20 percent but less than 40 percent impaired, limited or restricted  Self Care Goal Status (): At least 1 percent but less than 20 percent impaired, limited or restricted    Kenya Carpio OT  8/22/2017

## 2017-08-23 VITALS
BODY MASS INDEX: 34.07 KG/M2 | TEMPERATURE: 96.6 F | SYSTOLIC BLOOD PRESSURE: 120 MMHG | RESPIRATION RATE: 16 BRPM | HEIGHT: 75 IN | HEART RATE: 70 BPM | OXYGEN SATURATION: 93 % | WEIGHT: 274 LBS | DIASTOLIC BLOOD PRESSURE: 89 MMHG

## 2017-08-23 PROBLEM — F07.81 POST-CONCUSSION SYNDROME: Status: RESOLVED | Noted: 2017-08-22 | Resolved: 2017-08-23

## 2017-08-23 LAB
GLUCOSE BLDC GLUCOMTR-MCNC: 115 MG/DL (ref 70–130)
GLUCOSE BLDC GLUCOMTR-MCNC: 136 MG/DL (ref 70–130)

## 2017-08-23 PROCEDURE — G0378 HOSPITAL OBSERVATION PER HR: HCPCS

## 2017-08-23 PROCEDURE — 82962 GLUCOSE BLOOD TEST: CPT

## 2017-08-23 PROCEDURE — 99217 PR OBSERVATION CARE DISCHARGE MANAGEMENT: CPT | Performed by: NURSE PRACTITIONER

## 2017-08-23 PROCEDURE — 97530 THERAPEUTIC ACTIVITIES: CPT

## 2017-08-23 NOTE — DISCHARGE SUMMARY
[facility] Medicine Services  DISCHARGE SUMMARY       Date of Admission: 8/21/2017  Date of Discharge:  8/23/2017  Primary Care Physician: Justin Mccormack MD  Consulting Physician(s)          None         Discharge Diagnoses:  Active Hospital Problems (** Indicates Principal Problem)    Diagnosis Date Noted   • **Subdural hematoma with post-concussive syndrome [I62.00] 08/22/2017   • COPD (chronic obstructive pulmonary disease) [J44.9] 08/22/2017   • Type 2 diabetes mellitus [E11.9] 08/22/2017   • Sleep apnea [G47.30] 08/22/2017   • Essential hypertension [I10] 12/15/2016   • Coronary artery disease involving native coronary artery of native heart without angina pectoris [I25.10] 12/15/2016   • Acquired hypothyroidism [E03.9] 12/15/2016   • Hypercholesteremia [E78.00] 12/15/2016      Resolved Hospital Problems    Diagnosis Date Noted Date Resolved   • Post-concussion syndrome [F07.81] 08/22/2017 08/23/2017     Presenting Problem/History of Present Illness  Post-concussion syndrome [F07.81]  Post-concussion syndrome [F07.81]     Chief Complaint on Day of Discharge: f/u AMS    History of Present Illness on Day of Discharge:   Patient sitting up in room without any new complaints or issues.  Daughter at bedside.  States mentation at baseline.  Denies chest pain, shortness breath, or abdominal pain.  States he is ready to go home.  Hospital Course  Patient is a 71 y.o. 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  He presented to [facility] ED with acute change in mental status.  Family reports he was aphasic and unable to articulate his needs and concerns.  Also reported some short-term memory loss and general disorientation.  This is associated with increasingly unsteady gait and 2 recent falls.  2 weeks ago, patient was sitting  on a stool with wheels when he suddenly rolled backward and hit his head on a concrete wall.  No loss of consciousness but did have a headache for a week before he was convinced to be evaluated.  States CT scan at [facility] demonstrated a brain bleed and he was subsequently [facility] to [facility] in [place] for 3 days.  He was evaluated by neurosurgery with serial CT scans of head.  He was discharged in stable condition and instructed to follow-up with neurosurgery for a follow-up CT scan that was completed on 8/20/17.  He reports CT scan was unchanged however due to his symptoms of dizziness, confusion and gait instability he was instructed to see his PCP. He was unable to get appointment with PCP therefore he presented to [facility] for evaluation.  CT of head showed right sided inner hemispheric chronic subdural hematoma.  MRI confirmed.  There was trace subdural hematoma over the convex to the.  No significant mass affect or shift.  Neurosurgery was consulted and recommend following up with a CT of brain in 2-1/2-3 weeks.  Patient prefers to come back to [place] rather than see a neurosurgeon in [place].  Patient had home health after discharge from [facility] and will resume upon discharge today.  Reviewed home medication regimen with patient and he will continue current medication.  Continue to hold Aspirin until subdural hematoma has resolved.  Plans and instructions were reviewed with patient and daughter and both verbalize understanding.    Procedures Performed:  none       Consults:   Consults     Date and Time Order Name Status Description    8/22/2017 0828 Inpatient Consult to Neurosurgery          Pertinent Test Results:    Results from last 7 days  Lab Units 08/22/17  0525 08/21/17 2128   WBC 10*3/mm3 8.96 10.85*   HEMOGLOBIN g/dL 15.0 16.2   HEMATOCRIT % 42.8 46.1   PLATELETS 10*3/mm3 141* 165       Results from last 7 days  Lab Units 08/22/17  0525 08/21/17 2128    SODIUM mmol/L 136 136   POTASSIUM mmol/L 3.7 3.6   CHLORIDE mmol/L 101 99   CO2 mmol/L 26.0 29.0   BUN mg/dL 13 15   CREATININE mg/dL 1.00 1.10   GLUCOSE mg/dL 120* 129*   CALCIUM mg/dL 9.5 9.8     Imaging Results (last 7 days)     Procedure Component Value Units Date/Time    CT Head Without Contrast [[id number]]  (Abnormal) Collected:  08/21/17 2117     Updated:  08/21/17 2232    Narrative:       EXAM:    CT Head Without Intravenous Contrast    CLINICAL HISTORY:    71 years old, male; Injury or trauma and signs and symptoms; Fall; Initial   encounter; Concussion / head injury; Altered mental status/memory loss;   Confusion or disorientation; Additional info: Fall with known ich- worsening AMS    TECHNIQUE:    Axial computed tomography images of the head/brain without intravenous   contrast.  All CT scans at this facility use one or more dose reduction   techniques, viz.: automated exposure control; ma/kV adjustment per patient size   (including targeted exams where dose is matched to indication; i.e. head); or   iterative reconstruction technique.    COMPARISON:    No relevant prior studies available.    FINDINGS:    Brain:  Subdural fluid or blood hypodense to brain is seen to the left of the   falx in the frontal and parietal occipital regions extending down to the level   of the tentorium, maximum width approximately 9 mm along the falx.  Subdural   fluid or subacute blood is also noted overlying the left cerebral convexity at   the left frontal and temporal lobes, maximum width approximately 5 mm.    Effacement of thyroid sulci on the left suggesting diffuse cerebral edema.  No   significant white matter disease.    Midline shift:  6.7 mm of left-to-right midline shift.    Ventricles:  Unremarkable.  No ventriculomegaly.    Bones/joints:  Unremarkable.  No acute fracture.    Soft tissues:  Unremarkable.    Sinuses:  Unremarkable as visualized.  No acute sinusitis.    Mastoid air cells:  Unremarkable as  visualized.  No mastoid effusion.      Impression:       1.  Subdural fluid or blood hypodense to brain is seen to the left of the falx   in the frontal and parietal occipital regions extending down to the level of   the tentorium, maximum width approximately 9 mm along the falx.  2.  Subdural fluid or subacute blood is also noted overlying the left cerebral   convexity at the left frontal and temporal lobes, maximum width approximately 5   mm.  3.  6.7 mm of left-to-right midline shift.  The case was discussed with the primary care provider, Dr. Contreras at 9:29 PM   central central time on date of exam.  The primary care provider verbalized understanding of the findings.    THIS DOCUMENT HAS BEEN ELECTRONICALLY SIGNED BY JAYLEN LAZARO MD    XR Chest 1 View [[id number]]  (Abnormal) Collected:  08/21/17 2116     Updated:  08/21/17 2246    Narrative:       EXAM:    XR Chest, 1 View    CLINICAL HISTORY:    71 years old, male; Signs and symptoms; Other: AMS    TECHNIQUE:    Frontal view of the chest.    COMPARISON:    No relevant prior studies available.    FINDINGS:    Lungs:  Hypoinflation and bibasal atelectasis.    Pleural space:  Unremarkable.  No pneumothorax.    Heart:  Unremarkable.  No cardiomegaly.    Mediastinum:  Unremarkable.    Bones/joints:  Degenerative disc disease noted within the visualized spine.      Impression:         Hypoinflation and bibasal atelectasis.    THIS DOCUMENT HAS BEEN ELECTRONICALLY SIGNED BY LILY HWANG MD    MRI Brain Without Contrast [[id number]]  (Abnormal) Collected:  08/21/17 2117     Updated:  08/21/17 2334    Narrative:       EXAM:    MR Head Without Intravenous Contrast    CLINICAL HISTORY:    71 years old, male; Injury or trauma and signs and symptoms; Fall; Late   effect from previous injury; Bleeding / hemorrhage; Altered mental   status/memory loss and speech disturbance; Confusion or disorientation; Other:   Difficulty speaking; Injury date: 2 weeks ago; Injury details:  Fall from stool,   patient hit head; Patient HX: Patient has HX of fall 2 weeks ago with bleed,   current altered mental status/confusion, speech difficulty, nausea, headaches   **motion noted-best possible**; Additional info: Ams- recent tbi with ich    TECHNIQUE:    Magnetic resonance images of the head/brain without intravenous contrast in   multiple planes.    COMPARISON:    No relevant prior studies available.    FINDINGS:    Limitations:  Motion limited exam.    Brain:  Redemonstration of small left lateral convexity and left parafalcine   subdural fluid collections.  The signal intensity is compatible with late   subacute subdural hematomas.  No acute infarct.    Midline shift:  Approximately 6 mm rightward midline shift, unchanged.    Ventricles:  Unremarkable.  No ventriculomegaly.    Bones/joints:  Unremarkable.    Sinuses:  Mild paranasal sinus disease noted, most prominent within the right   maxillary sinus.  No acute sinusitis.    Mastoid air cells:  Unremarkable as visualized.  No mastoid effusion.    Orbits:  Unremarkable as visualized.      Impression:       1.  Motion limited exam.  2.  Redemonstration of small left lateral convexity and left parafalcine   subdural fluid collections.  The signal intensity is compatible with subacute   subdural hematomas.  No acute bleed identified.    3.  Approximately 6 mm rightward midline shift, unchanged.    THIS DOCUMENT HAS BEEN ELECTRONICALLY SIGNED BY LILY HWANG MD    CT Head Without Contrast [[id number]] Collected:  08/22/17 1115     Updated:  08/22/17 2244    Narrative:       EXAMINATION: CT HEAD WO CONTRAST- 08/22/2017     INDICATION: brain bleed; F07.81-Postconcussional syndrome; R55-Syncope  and collapse; I10-Essential (primary) hypertension; E78.00-Pure  hypercholesterolemia, unspecified; I62.00-Nontraumatic subdural  hemorrhage, unspecified; R47.01-Aphasia; R41.3-Other amnesia;  Z74.09-Other reduced mobility     TECHNIQUE: 5 mm unenhanced images  "through the brain     The radiation dose reduction device was turned on for each scan per the  ALARA (As Low as Reasonably Achievable) protocol.     COMPARISON: Brain MRI and head CT scan of 08/21/2017     FINDINGS: Left parafalcine subdural hematoma appears unchanged from  yesterday's exam. The anterior portion of the parafalcine hemorrhage is  difficult to distinguish from normal falx, but clearly there was  subdural hemorrhage present here on the previous MRI. The much smaller  and more subtle left lateral convexity hematoma is barely even visible,  and is also much better appreciated on MRI. There is no evidence of new  hemorrhage, no evidence of new edema, mass, mass effect, hydrocephalus  or new extra-axial collection. Subtle low attenuation changes in the  posterior left parietal lobe within a couple of centimeters of the  subdural/parafalcine collection, appear unchanged and presumably  chronic. This area did not represent acute infarct, by MRI criteria.       Impression:       Stable posterior left parafalcine hemorrhage, very subtle  anterior left parafalcine hemorrhage and left subdural hemorrhage.  Stable chronic appearing white matter changes of left parietal lobe. No  clearly new or progressive disease is seen.     D:  08/22/2017  E:  08/22/2017        This report was finalized on 8/22/2017 10:42 PM by DR. Drew Haines MD.           Condition on Discharge:  stable    Physical Exam on Discharge:/89  Pulse 70  Temp 96.6 °F (35.9 °C) (Oral)   Resp 16  Ht 75\" (190.5 cm)  Wt 274 lb (124 kg)  SpO2 93%  BMI 34.25 kg/m2  Physical Exam  Gen-no acute distress  CV-RRR, S1 S2 normal, no m/r/g  Resp-CTAB, no wheezes  Abd-obese, NT, ND, +BS  Ext-no edema  Neuro-A&Ox3, no focal deficits  Psych-appropriate mood    Discharge Disposition  Home-Health Care Svc    Discharge Medications   Robbie Carmen   Home Medication Instructions LA NENA:[id number]    Printed on:08/23/17 1030   Medication Information         "              atorvastatin (LIPITOR) 40 MG tablet  Take 40 mg by mouth Daily.             diltiazem LA (CARDIZEM LA) 360 MG 24 hr tablet  Take 1 tablet by mouth Daily.             DULoxetine (CYMBALTA) 60 MG capsule  Take 60 mg by mouth Every Other Day. Takes on days that he isn't taking flomax.             levothyroxine (SYNTHROID, LEVOTHROID) 150 MCG tablet  Take 150 mcg by mouth Daily.             tamsulosin (FLOMAX) 0.4 MG capsule 24 hr capsule  Take 1 capsule by mouth Daily.             vitamin D (ERGOCALCIFEROL) 29776 UNITS capsule capsule  Take 50,000 Units by mouth 1 (One) Time Per Week.                 Discharge Diet:   Diet Instructions     Diet: Regular, Cardiac, Consistent Carbohydrate       Discharge Diet:   Regular  Cardiac  Consistent Carbohydrate                    Discharge Care Plan / Instructions:    Activity at Discharge:   Activity Instructions     Activity as Tolerated                     Follow-up Appointments  Future Appointments  Date Time Provider Department Center   1/8/2018 9:00 AM GITA Vazquez [facility] None     Additional Instructions for the Follow-ups that You Need to Schedule     Ambulatory Referral to Home Health    As directed    Face to Face Visit Date:  8/22/2017   Follow-up Provider for Plan of Care?:  I treated the patient in an acute care facility and will not continue treatment after discharge.   Follow-up Provider:  NESTOR LYLE   Reason/Clinical Findings:  Subdural hematoma,   Describe mobility limitations that make leaving home difficult:  Recent Fall, Subdural hematoma, Unsteady on feet, decrease strength, Fall Risk.   Nursing/Therapeutic Services Requested:   Skilled Nursing  Physical Therapy  Occupational Therapy      Skilled nursing orders:   Medication education  Other      PT orders:   Strengthening  Gait Training  Transfer training  Home safety assessment      Weight Bearing Status:  Full Weight Bearing   Occupational orders:   Home safety  assessment  Cognition  Strengthening  Activities of daily living  Energy conservation      Frequency:  1 Week 1       Discharge Follow-up with PCP    As directed    Follow Up Details:  within 1 week for hospital follow up       Discharge Follow-up with Specialty    As directed    Specialty:  NS   Follow Up Details:  2 1/2 to 3 weeks with CT scan of brain       CT Head Without Contrast    Sep 05, 2017    Reason for Exam:  follow up subdural hematoma                 Test Results Pending at Discharge       Yuridia Srinivasan, GITA 08/23/17 10:30 AM    Time: Discharge 35 min    Please note that portions of this note may have been completed with a voice recognition program. Efforts were made to edit the dictations, but occasionally words are mistranscribed.

## 2017-08-23 NOTE — PROGRESS NOTES
Continued Stay Note  [facility]     Patient Name: Robbie Carmen  MRN: [id number]  Today's Date: 8/23/2017    Admit Date: 8/21/2017          Discharge Plan       08/23/17 1035    Case Management/Social Work Plan    Plan Home with [facility] for SN/PT/OT.    Patient/Family In Agreement With Plan yes    Additional Comments Patient being discharged home today.  [facility] called at [phone] and spoke with Joslyn, she states they have all they need and are now aware of patient's discharge today.  CM available.    Final Note    Final Note Home with [facility]              Discharge Codes     None        Expected Discharge Date and Time     Expected Discharge Date Expected Discharge Time    Aug 23, 2017           Ibeth Lepe RN

## 2017-08-23 NOTE — PLAN OF CARE
Problem: Fall Risk (Adult)  Goal: Identify Related Risk Factors and Signs and Symptoms  Outcome: Ongoing (interventions implemented as appropriate)    Problem: Patient Care Overview (Adult)  Goal: Plan of Care Review  Outcome: Ongoing (interventions implemented as appropriate)    08/23/17 0433   Coping/Psychosocial Response Interventions   Plan Of Care Reviewed With patient;son   Patient Care Overview   Progress progress toward functional goals as expected   Outcome Evaluation   Outcome Summary/Follow up Plan Pt awake off and on during the 7p shift. Up to bedside commode with max assist of 2 staff. Son at bedside. VSS

## 2017-08-23 NOTE — PLAN OF CARE
Problem: Patient Care Overview (Adult)  Goal: Plan of Care Review  Outcome: Ongoing (interventions implemented as appropriate)    08/23/17 1039   Coping/Psychosocial Response Interventions   Plan Of Care Reviewed With patient   Patient Care Overview   Progress progress toward functional goals as expected   Outcome Evaluation   Outcome Summary/Follow up Plan Pt progressing with equal strength BUE, and demo ability to don socks, but continues with difficulty with fluid conversational speech and decreased orientation to place today.         Problem: Inpatient Occupational Therapy  Goal: Transfer Training Goal 1 LTG- OT  Outcome: Ongoing (interventions implemented as appropriate)    08/22/17 0931 08/23/17 1039   Transfer Training OT LTG   Transfer Training OT LTG, Date Established 08/22/17 --    Transfer Training OT LTG, Time to Achieve by discharge --    Transfer Training OT LTG, Activity Type toilet;bed to chair /chair to bed --    Transfer Training OT LTG, Gillespie Level supervision required --    Transfer Training OT LTG, Outcome --  goal ongoing       Goal: Strength Goal LTG- OT  Outcome: Outcome(s) achieved Date Met:  08/23/17 08/23/17 1039   Strength OT LTG   Strength Goal OT LTG, Outcome goal no longer appropriate  (pt with 5/5 BUE strength)       Goal: Dynamic Standing Balance Goal LTG-OT  Outcome: Ongoing (interventions implemented as appropriate)    08/22/17 0931 08/23/17 1039   Dynamic Standing Balance OT LTG   Dynamic Standing Balance OT LTG, Date Established 08/22/17 --    Dynamic Standing Balance OT LTG, Time to Achieve by discharge --    Dynamic Standing Balance OT LTG, Gillespie Level supervision required --    Dynamic Standing Balance OT LTG, Outcome --  goal ongoing       Goal: Safety Awareness Goal LTG- OT  Outcome: Ongoing (interventions implemented as appropriate)    08/22/17 0931 08/23/17 1039   Safety Awareness OT LTG   Safety Awareness OT LTG, Date Established 08/22/17 --    Safety  Awareness OT LTG, Time to Achieve by discharge --    Safety Awareness OT LTG, Activity Type good safety awareness;with ADL's --    Safety Awareness OT LTG, Outcome --  goal ongoing

## 2017-08-23 NOTE — THERAPY TREATMENT NOTE
Acute Care - Occupational Therapy Treatment Note  [facility]     Patient Name: Robbie Carmen  : 1945  MRN: [id number]  Today's Date: 2017  Onset of Illness/Injury or Date of Surgery Date: 17  Date of Referral to OT: 17  Referring Physician: ZAFAR Marie      Admit Date: 2017    Visit Dx:     ICD-10-CM ICD-9-CM   1. Post-concussion syndrome F07.81 310.2   2. Near syncope R55 780.2   3. Essential hypertension I10 401.9   4. Hypercholesteremia E78.00 272.0   5. SDH (subdural hematoma) I62.00 432.1   6. Expressive aphasia R47.01 784.3   7. Short-term memory loss R41.3 780.93   8. Impaired functional mobility, balance, gait, and endurance Z74.09 V49.89   9. Impaired mobility and ADLs Z74.09 799.89   10. Subdural hematoma with post-concussive syndrome I62.00 432.1     Patient Active Problem List   Diagnosis   • Coronary artery disease involving native coronary artery of native heart without angina pectoris   • Essential hypertension   • Hypercholesteremia   • Acquired hypothyroidism   • Metabolic syndrome   • COPD (chronic obstructive pulmonary disease)   • Type 2 diabetes mellitus   • Sleep apnea   • Subdural hematoma with post-concussive syndrome             Adult Rehabilitation Note       17 0805          Rehab Assessment/Intervention    Discipline occupational therapist  -AC      Document Type therapy note (daily note)  -AC      Subjective Information agree to therapy;complains of   pt reporting difficulty word finding  -AC      Patient Effort, Rehab Treatment good  -AC      Precautions/Limitations fall precautions  -AC      Recorded by [AC] Kenya Carpio OT      Vital Signs    Pre Systolic BP Rehab 97  -AC      Pre Treatment Diastolic BP 77  -AC      Intra Systolic BP Rehab 103  -AC      Intra Treatment Diastolic BP 73  -AC      Post Systolic BP Rehab 168  -AC      Post Treatment Diastolic   -AC      Recorded by [AC] Kenya Carpio OT      Pain Assessment    Pain  Assessment No/denies pain  -AC      Recorded by [AC] Kenya Carpio OT      Vision Assessment/Intervention    Visual Impairment WFL with corrective lenses   reading signs and indetifying objects in hallway  -AC      Recorded by [AC] Kenya Carpio OT      Cognitive Assessment/Intervention    Current Cognitive/Communication Assessment impaired   difficulty with fluid conversation, difficulty word finding  -AC      Orientation Status oriented to;person;time   iniitally unable to state which facility he was in  -AC      Follows Commands/Answers Questions 100% of the time  -AC      Personal Safety mild impairment  -AC      Personal Safety Interventions fall prevention program maintained;gait belt;nonskid shoes/slippers when out of bed  -AC      Recorded by [AC] Kenya Carpio OT      MMT (Manual Muscle Testing)    General MMT Assessment no strength deficits identified   equal strength noted today  -AC      Recorded by [AC] Kenya Carpio OT      Bed Mobility, Assessment/Treatment    Bed Mobility, Comment UIC  -AC      Recorded by [AC] Kenya Carpio OT      Transfer Assessment/Treatment    Transfers, Sit-Stand [place] supervision required  -AC      Transfers, Stand-Sit [place] supervision required  -AC      Recorded by [AC] Kenya Carpio OT      Functional Mobility    Functional Mobility- Ind. Level contact guard assist  -AC      Functional Mobility- Device --   gait belt  -AC      Functional Mobility-Distance (Feet) 300  -AC      Functional Mobility- Comment followed with chair  -AC      Recorded by [AC] Kenya Carpio OT      Lower Body Dressing Assessment/Training    LB Dressing Assess/Train, Clothing Type donning:;slipper socks  -AC      LB Dressing Assess/Train, Position sitting  -AC      LB Dressing Assess/Train, [place] independent  -AC      Recorded by [AC] Kenya Carpio OT      Positioning and Restraints    Pre-Treatment Position sitting in chair/recliner  -AC      Post Treatment Position chair   -AC      In Chair reclined;call light within reach;encouraged to call for assist;with family/caregiver  -AC      Recorded by [AC] Kenya Carpio OT        User Key  (r) = Recorded By, (t) = Taken By, (c) = Cosigned By    Initials Name Effective Dates    AC Kenya Carpio, OT 06/23/15 -                 OT Goals       08/23/17 1039 08/22/17 0931       Transfer Training OT LTG    Transfer Training OT LTG, Date Established  08/22/17  -AC     Transfer Training OT LTG, Time to Achieve  by discharge  -AC     Transfer Training OT LTG, Activity Type  toilet;bed to chair /chair to bed  -AC     Transfer Training OT LTG, [place] Level  supervision required  -AC     Transfer Training OT LTG, Outcome goal ongoing  -AC      Strength OT LTG    Strength Goal OT LTG, Date Established  08/22/17  -AC     Strength Goal OT LTG, Time to Achieve  by discharge  -AC     Strength Goal OT LTG, Measure to Achieve  pt will complete 2 sets x 10 BUE strengthening HEP as needed to support ADLs  -AC     Strength Goal OT LTG, Outcome goal no longer appropriate   pt with 5/5 BUE strength  -AC      Dynamic Standing Balance OT LTG    Dynamic Standing Balance OT LTG, Date Established  08/22/17  -AC     Dynamic Standing Balance OT LTG, Time to Achieve  by discharge  -AC     Dynamic Standing Balance OT LTG, [place] Level  supervision required  -AC     Dynamic Standing Balance OT LTG, Outcome goal ongoing  -AC      Safety Awareness OT LTG    Safety Awareness OT LTG, Date Established  08/22/17  -AC     Safety Awareness OT LTG, Time to Achieve  by discharge  -AC     Safety Awareness OT LTG, Activity Type  good safety awareness;with ADL's  -AC     Safety Awareness OT LTG, Outcome goal ongoing  -AC        User Key  (r) = Recorded By, (t) = Taken By, (c) = Cosigned By    Initials Name Provider Type    NETTA Carpio OT Occupational Therapist          Occupational Therapy Education     Title: PT OT SLP Therapies (Active)     Topic: Occupational  Therapy (Active)     Point: ADL training (Done)    Description: Instruct learner(s) on proper safety adaptation and remediation techniques during self care or transfers.   Instruct in proper use of assistive devices.    Learning Progress Summary    Learner Readiness Method Response Comment Documented by Status   Patient Acceptance E VU safety awareness with ADLs  08/23/17 1038 Done    Acceptance E VU Benefits of activity, role of OT  08/22/17 0930 Done   Family Acceptance E VU Benefits of activity, role of OT  08/22/17 0930 Done                      User Key     Initials Effective Dates Name Provider Type Discipline     06/23/15 -  Kenya Carpio, OT Occupational Therapist OT                  OT Recommendation and Plan  Anticipated Discharge Disposition: home with assist  Planned Therapy Interventions: ADL retraining, balance training, bed mobility training, strengthening, transfer training  Therapy Frequency: daily  Plan of Care Review  Plan Of Care Reviewed With: patient  Progress: progress toward functional goals as expected  Outcome Summary/Follow up Plan: Pt progressing with equal strength BUE, and demo ability to don socks, but continues with difficulty with fluid conversational speech and decreased orientation to place today.        Outcome Measures       08/23/17 0805 08/22/17 0827 08/22/17 0822    How much help from another person do you currently need...    Turning from your back to your side while in flat bed without using bedrails?   3  -MJ    Moving from lying on back to sitting on the side of a flat bed without bedrails?   3  -MJ    Moving to and from a bed to a chair (including a wheelchair)?   3  -MJ    Standing up from a chair using your arms (e.g., wheelchair, bedside chair)?   3  -MJ    Climbing 3-5 steps with a railing?   3  -MJ    To walk in hospital room?   3  -MJ    AM-PAC 6 Clicks Score   18  -MJ    How much help from another is currently needed...    Putting on and taking off regular  lower body clothing? 3  -AC 3  -AC     Bathing (including washing, rinsing, and drying) 3  -AC 3  -AC     Toileting (which includes using toilet bed pan or urinal) 3  -AC 3  -AC     Putting on and taking off regular upper body clothing 4  -AC 4  -AC     Taking care of personal grooming (such as brushing teeth) 4  -AC 4  -AC     Eating meals 4  -AC 4  -AC     Score 21  -AC 21  -AC     Modified [place] Scale    Modified [place] Scale  2 - Slight disability.  Unable to carry out all previous activities but able to look after own affairs without assistance.  -AC 2 - Slight disability.  Unable to carry out all previous activities but able to look after own affairs without assistance.  -    Functional Assessment    Outcome Measure Options AM-PAC 6 Clicks Daily Activity (OT)  - Modified [place]  - AM-PAC 6 Clicks Basic Mobility (PT);Modified [place]  -      User Key  (r) = Recorded By, (t) = Taken By, (c) = Cosigned By    Initials Name Provider Type     Kenya Carpio OT Occupational Therapist    AISHWARYA Steve, PT Physical Therapist           Time Calculation:         Time Calculation- OT       08/23/17 1043          Time Calculation- OT    OT Start Time 0805  -      Total Timed Code Minutes- OT 25 minute(s)  -      OT Received On 08/23/17  -      OT Goal Re-Cert Due Date 09/01/17  -        User Key  (r) = Recorded By, (t) = Taken By, (c) = Cosigned By    Initials Name Provider Type     Kenya Carpio OT Occupational Therapist           Therapy Charges for Today     Code Description Service Date Service Provider Modifiers Qty    [id number]  OT EVAL LOW COMPLEXITY 4 8/22/2017 Kenya Carpio OT GO 1    [id number]  OT SELFCARE CURRENT 8/22/2017 MATTIE Duke, CJ 1    [id number]  OT SELFCARE PROJECTED 8/22/2017 MATTIE Duke, CI 1    [id number]  OT THERAPEUTIC ACT EA 15 MIN 8/23/2017 MATTIE Duke 2          OT G-codes  Functional Assessment Tool Used: impact 6 clicks  Score:  21  Self Care Current Status (): At least 20 percent but less than 40 percent impaired, limited or restricted  Self Care Goal Status (): At least 1 percent but less than 20 percent impaired, limited or restricted    Kenya Carpio, OT  8/23/2017

## 2017-08-23 NOTE — PLAN OF CARE
Problem: Fall Risk (Adult)  Goal: Identify Related Risk Factors and Signs and Symptoms  Outcome: Ongoing (interventions implemented as appropriate)    Problem: Patient Care Overview (Adult)  Goal: Plan of Care Review  Outcome: Ongoing (interventions implemented as appropriate)    08/23/17 0441   Coping/Psychosocial Response Interventions   Plan Of Care Reviewed With patient   Patient Care Overview   Progress progress toward functional goals as expected   Outcome Evaluation   Outcome Summary/Follow up Plan Pt appears to have slept well during the 7p shift. Pt A&O, cooperative. Family at bedside. VSS

## 2017-08-24 NOTE — THERAPY DISCHARGE NOTE
Acute Care - Occupational Therapy Discharge Summary  [facility]     Patient Name: Robbie Carmen  : 1945  MRN: [id number]    Today's Date: 2017  Onset of Illness/Injury or Date of Surgery Date: 17    Date of Referral to OT: 17  Referring Physician: ZAFAR Marie      Admit Date: 2017        OT Recommendation and Plan    Visit Dx:    ICD-10-CM ICD-9-CM   1. Post-concussion syndrome F07.81 310.2   2. Near syncope R55 780.2   3. Essential hypertension I10 401.9   4. Hypercholesteremia E78.00 272.0   5. SDH (subdural hematoma) I62.00 432.1   6. Expressive aphasia R47.01 784.3   7. Short-term memory loss R41.3 780.93   8. Impaired functional mobility, balance, gait, and endurance Z74.09 V49.89   9. Impaired mobility and ADLs Z74.09 799.89   10. Subdural hematoma with post-concussive syndrome I62.00 432.1                     OT Goals       17 1039 17 0931       Transfer Training OT LTG    Transfer Training OT LTG, Date Established  17  -AC     Transfer Training OT LTG, Time to Achieve  by discharge  -AC     Transfer Training OT LTG, Activity Type  toilet;bed to chair /chair to bed  -AC     Transfer Training OT LTG, [place] Level  supervision required  -AC     Transfer Training OT LTG, Outcome goal ongoing  -AC      Strength OT LTG    Strength Goal OT LTG, Date Established  17  -AC     Strength Goal OT LTG, Time to Achieve  by discharge  -AC     Strength Goal OT LTG, Measure to Achieve  pt will complete 2 sets x 10 BUE strengthening HEP as needed to support ADLs  -AC     Strength Goal OT LTG, Outcome goal no longer appropriate   pt with 5/5 BUE strength  -AC      Dynamic Standing Balance OT LTG    Dynamic Standing Balance OT LTG, Date Established  17  -AC     Dynamic Standing Balance OT LTG, Time to Achieve  by discharge  -AC     Dynamic Standing Balance OT LTG, [place] Level  supervision required  -AC     Dynamic Standing Balance OT LTG, Outcome goal  ongoing  -AC      Safety Awareness OT LTG    Safety Awareness OT LTG, Date Established  08/22/17  -     Safety Awareness OT LTG, Time to Achieve  by discharge  -AC     Safety Awareness OT LTG, Activity Type  good safety awareness;with ADL's  -AC     Safety Awareness OT LTG, Outcome goal ongoing  -AC        User Key  (r) = Recorded By, (t) = Taken By, (c) = Cosigned By    Initials Name Provider Type    NETTA Carpio OT Occupational Therapist                Outcome Measures       08/23/17 0805 08/22/17 0827 08/22/17 0822    How much help from another person do you currently need...    Turning from your back to your side while in flat bed without using bedrails?   3  -MJ    Moving from lying on back to sitting on the side of a flat bed without bedrails?   3  -MJ    Moving to and from a bed to a chair (including a wheelchair)?   3  -MJ    Standing up from a chair using your arms (e.g., wheelchair, bedside chair)?   3  -MJ    Climbing 3-5 steps with a railing?   3  -MJ    To walk in hospital room?   3  -MJ    AM-PAC 6 Clicks Score   18  -MJ    How much help from another is currently needed...    Putting on and taking off regular lower body clothing? 3  -AC 3  -AC     Bathing (including washing, rinsing, and drying) 3  -AC 3  -AC     Toileting (which includes using toilet bed pan or urinal) 3  -AC 3  -AC     Putting on and taking off regular upper body clothing 4  -AC 4  -AC     Taking care of personal grooming (such as brushing teeth) 4  -AC 4  -AC     Eating meals 4  -AC 4  -AC     Score 21  -AC 21  -AC     Modified [place] Scale    Modified [place] Scale  2 - Slight disability.  Unable to carry out all previous activities but able to look after own affairs without assistance.  -AC 2 - Slight disability.  Unable to carry out all previous activities but able to look after own affairs without assistance.  -MJ    Functional Assessment    Outcome Measure Options AM-PAC 6 Clicks Daily Activity (OT)  -AC Modified [place]   -AC AM-PAC 6 Clicks Basic Mobility (PT);Modified [place]  -MJ      User Key  (r) = Recorded By, (t) = Taken By, (c) = Cosigned By    Initials Name Provider Type    NETTA Carpio, OT Occupational Therapist    AISHWARYA Steve, PT Physical Therapist              OT Discharge Summary  Reason for Discharge: Discharge from facility  Outcomes Achieved: Refer to plan of care for updates on goals achieved  Discharge Destination: Home with home health      Mary Yip, OT  8/24/2017

## 2017-08-25 NOTE — THERAPY DISCHARGE NOTE
Acute Care - Physical Therapy Discharge Summary  [facility]       Patient Name: Robbie Carmen  : 1945  MRN: [id number]    Today's Date: 2017  Onset of Illness/Injury or Date of Surgery Date: 17    Date of Referral to PT: 17  Referring Physician: ZAFAR Marie      Admit Date: 2017      PT Recommendation and Plan    Visit Dx:    ICD-10-CM ICD-9-CM   1. Post-concussion syndrome F07.81 310.2   2. Near syncope R55 780.2   3. Essential hypertension I10 401.9   4. Hypercholesteremia E78.00 272.0   5. SDH (subdural hematoma) I62.00 432.1   6. Expressive aphasia R47.01 784.3   7. Short-term memory loss R41.3 780.93   8. Impaired functional mobility, balance, gait, and endurance Z74.09 V49.89   9. Impaired mobility and ADLs Z74.09 799.89   10. Subdural hematoma with post-concussive syndrome I62.00 432.1             Outcome Measures       17 0805 17 0827 17 0822    How much help from another person do you currently need...    Turning from your back to your side while in flat bed without using bedrails?   3  -MJ    Moving from lying on back to sitting on the side of a flat bed without bedrails?   3  -MJ    Moving to and from a bed to a chair (including a wheelchair)?   3  -MJ    Standing up from a chair using your arms (e.g., wheelchair, bedside chair)?   3  -MJ    Climbing 3-5 steps with a railing?   3  -MJ    To walk in hospital room?   3  -MJ    AM-PAC 6 Clicks Score   18  -MJ    How much help from another is currently needed...    Putting on and taking off regular lower body clothing? 3  -AC 3  -AC     Bathing (including washing, rinsing, and drying) 3  -AC 3  -AC     Toileting (which includes using toilet bed pan or urinal) 3  -AC 3  -AC     Putting on and taking off regular upper body clothing 4  -AC 4  -AC     Taking care of personal grooming (such as brushing teeth) 4  -AC 4  -AC     Eating meals 4  -AC 4  -AC     Score 21  -AC 21  -AC     Modified [place] Scale     Modified [place] Scale  2 - Slight disability.  Unable to carry out all previous activities but able to look after own affairs without assistance.  -AC 2 - Slight disability.  Unable to carry out all previous activities but able to look after own affairs without assistance.  -    Functional Assessment    Outcome Measure Options AM-PAC 6 Clicks Daily Activity (OT)  -AC Modified [place]  -AC AM-PAC 6 Clicks Basic Mobility (PT);Modified [place]  -MJ      User Key  (r) = Recorded By, (t) = Taken By, (c) = Cosigned By    Initials Name Provider Type    AC Kenya Carpio, OT Occupational Therapist    AISHWARYA Steve, PT Physical Therapist                      IP PT Goals       08/22/17 0924          Bed Mobility PT LTG    Bed Mobility PT LTG, Date Established 08/22/17  -MJ      Bed Mobility PT LTG, Time to Achieve 5 days  -MJ      Bed Mobility PT LTG, Activity Type supine to sit/sit to supine  -MJ      Bed Mobility PT LTG, [place] Level independent  -MJ      Bed Mobility PT LTG, Date Goal Reviewed 08/22/17  -MJ      Bed Mobility PT LTG, Outcome goal ongoing  -MJ      Transfer Training PT LTG    Transfer Training PT LTG, Date Established 08/22/17  -MJ      Transfer Training PT LTG, Time to Achieve 5 days  -MJ      Transfer Training PT LTG, Activity Type sit to stand/stand to sit  -MJ      Transfer Training PT LTG, [place] Level independent  -MJ      Transfer Training PT  LTG, Date Goal Reviewed 08/22/17  -MJ      Transfer Training PT LTG, Outcome goal ongoing  -MJ      Gait Training PT LTG    Gait Training Goal PT LTG, Date Established 08/22/17  -MJ      Gait Training Goal PT LTG, Time to Achieve 5 days  -MJ      Gait Training Goal PT LTG, [place] Level independent  -MJ      Gait Training Goal PT LTG, Distance to Achieve 500 feet  -MJ      Gait Training Goal PT LTG, Date Goal Reviewed 08/22/17  -MJ      Gait Training Goal PT LTG, Outcome goal ongoing  -MJ      Stair Training PT LTG    Stair Training Goal PT LTG,  Date Established 08/22/17  -MJ      Stair Training Goal PT LTG, Time to Achieve 5 days  -MJ      Stair Training Goal PT LTG, Number of Steps 4  -MJ      Stair Training Goal PT LTG, [place] Level supervision required  -MJ      Stair Training Goal PT LTG, Assist Device 1 handrail  -MJ      Stair Training Goal PT LTG, Date Goal Reviewed 08/22/17  -MJ      Stair Training Goal PT LTG, Outcome goal ongoing  -MJ        User Key  (r) = Recorded By, (t) = Taken By, (c) = Cosigned By    Initials Name Provider Type    AISHWARYA Steve, PT Physical Therapist            Goals Status: Treatment plan discontinued secondary to discharge from acute facility.    PT Discharge Summary  Reason for Discharge: Discharge from facility  Outcomes Achieved: Refer to plan of care for updates on goals achieved  Discharge Destination: Home with home health      Chelsea Gonzalez, PT   8/25/2017

## 2018-01-08 ENCOUNTER — OFFICE VISIT (OUTPATIENT)
Dept: CARDIOLOGY | Facility: CLINIC | Age: 73
End: 2018-01-08

## 2018-01-08 VITALS
HEIGHT: 75 IN | BODY MASS INDEX: 35.56 KG/M2 | SYSTOLIC BLOOD PRESSURE: 100 MMHG | HEART RATE: 84 BPM | WEIGHT: 286 LBS | DIASTOLIC BLOOD PRESSURE: 56 MMHG

## 2018-01-08 DIAGNOSIS — I25.10 CORONARY ARTERY DISEASE INVOLVING NATIVE CORONARY ARTERY OF NATIVE HEART WITHOUT ANGINA PECTORIS: ICD-10-CM

## 2018-01-08 DIAGNOSIS — R42 DIZZINESS: ICD-10-CM

## 2018-01-08 DIAGNOSIS — R06.02 SHORTNESS OF BREATH: Primary | ICD-10-CM

## 2018-01-08 DIAGNOSIS — M25.562 CHRONIC PAIN OF LEFT KNEE: ICD-10-CM

## 2018-01-08 DIAGNOSIS — J44.9 COPD MIXED TYPE (HCC): ICD-10-CM

## 2018-01-08 DIAGNOSIS — E78.00 HYPERCHOLESTEREMIA: ICD-10-CM

## 2018-01-08 DIAGNOSIS — I10 ESSENTIAL HYPERTENSION: ICD-10-CM

## 2018-01-08 DIAGNOSIS — E03.9 ACQUIRED HYPOTHYROIDISM: ICD-10-CM

## 2018-01-08 DIAGNOSIS — G89.29 CHRONIC PAIN OF LEFT KNEE: ICD-10-CM

## 2018-01-08 DIAGNOSIS — G47.39 OTHER SLEEP APNEA: ICD-10-CM

## 2018-01-08 DIAGNOSIS — E11.9 TYPE 2 DIABETES MELLITUS WITHOUT COMPLICATION, WITHOUT LONG-TERM CURRENT USE OF INSULIN (HCC): ICD-10-CM

## 2018-01-08 PROCEDURE — 99214 OFFICE O/P EST MOD 30 MIN: CPT | Performed by: NURSE PRACTITIONER

## 2018-01-08 RX ORDER — DICLOFENAC SODIUM 75 MG/1
75 TABLET, DELAYED RELEASE ORAL 2 TIMES DAILY
COMMUNITY
End: 2019-02-21

## 2018-01-08 RX ORDER — DILTIAZEM HYDROCHLORIDE EXTENDED-RELEASE TABLETS 300 MG/1
300 TABLET, EXTENDED RELEASE ORAL DAILY
Qty: 90 TABLET | Refills: 2 | Status: SHIPPED | OUTPATIENT
Start: 2018-01-08 | End: 2018-10-15 | Stop reason: SDUPTHER

## 2018-01-08 NOTE — PROGRESS NOTES
Chief Complaint   Patient presents with   • Follow-up     for cardiac management   • Hospital admission     brain bleed after a fall at home in August, was in [facility] then transfered to .   • Med Refill   • Cardiac clearance     Dr Marquez is doing knee replacement Feb 28th. Pt aware to have his office to notify us for clearance.    • Diabetes     recently diagnosed with diabetes and started on metformin, PCP monitoring   • Dizziness     random dizziness, BP today was 100/56       Cardiac Complaints  dyspnea and Dizziness      Subjective   Robbie Carmen is a 72 y.o. male with HTN, hyperlipidemia, who diagnosed with ischemic heart disease in 1999. He underwent stenting in 2004. His last cardiac workup showed no evidence of ischemia. In 2016, he had stopped taking some of his medication including Cardizem. Due to tachycardia it was restarted. The dose of Lisinopril was decreased. Follow up EKG showed sinus with first degree. He has stopped Lisinopril and blood pressure was normal. He then had a spell in August with a pre-syncopal episode at Dr. ISABELLA Garg's office he was encouraged to go to ER but refused.  Patient then called our office as they felt it could be cardiac related and stress and echo were advised. He then had a brain bleed after a fall in August and was transferred from [facility] to [facility] for higher level of care.   He returns today for follow up and denies any chest pain or palpitations.  He does report some shortness of breath with exertion but reports it is no worse than prior, he attributes to his increase in weight and inactivity.  He does also report dizziness that she says will come at random.  He states that it happens a great deal of it happens when he stands too quickly.  Labs he reports with PCP, he states most recent showed new onset diabetes and metformin was started.  No refills are needed at present.  He continues to have left knee pain and reports he is scheduled for knee replacement in February  with Dr. Marquez.        Cardiac History  Past Surgical History:   Procedure Laterality Date   • CARDIAC CATHETERIZATION  08/17/1999    Cath- 65% RCA.    • CARDIAC CATHETERIZATION  11/30/2004    Cath- 75% RCA. 3.5 x 32 mm Taxus Stent.    • CARDIAC CATHETERIZATION  11/17/2005    Cath- 75% in-Stent Stenosis. 3.5 x 28 mm Cypher Stent   • CARDIAC CATHETERIZATION  05/24/2011    Cath- Patent Stent in RCA. PA- 44 mmHg    • CARDIOVASCULAR STRESS TEST  06/04/2007     Stress- 4 Min 85% THR. Inferior Infarct .     • CARDIOVASCULAR STRESS TEST  07/30/2009    Stress- 4 Min, 36 sec. 97% THR. BP- 182/92. Small lateral Ischemia.    • CARDIOVASCULAR STRESS TEST  05/10/2011    Stress- 3 min 30 sec. 86% THR. 170/82. Negative.    • CARDIOVASCULAR STRESS TEST  05/24/2016    (Mod) 7 Min, 14 Secs. 91% THR. BP- 178/88. Negative   • CHOLECYSTECTOMY     • CORONARY STENT PLACEMENT     • ECHO - CONVERTED  06/04/2007    Echo- EF 65%.    • ECHO - CONVERTED  07/30/2009    Echo- EF >60%, Mild MR.    • ECHO - CONVERTED  04/27/2010    S. Echo- 5 Min, 49 Sec. 88% THR. BP_ 178/80. Negative.    • OTHER SURGICAL HISTORY  05/11/2012    Carotid US- Normal.    • VOCAL FOLD LESION EXCISION         Current Outpatient Prescriptions   Medication Sig Dispense Refill   • atorvastatin (LIPITOR) 40 MG tablet Take 40 mg by mouth Daily.     • diclofenac (VOLTAREN) 75 MG EC tablet Take 75 mg by mouth 2 (Two) Times a Day.     • DULoxetine (CYMBALTA) 60 MG capsule Take 60 mg by mouth Every Other Day. Takes on days that he isn't taking flomax.     • levothyroxine (SYNTHROID, LEVOTHROID) 150 MCG tablet Take 150 mcg by mouth Daily.     • metFORMIN (GLUCOPHAGE) 500 MG tablet Take 500 mg by mouth 2 (Two) Times a Day With Meals.     • tamsulosin (FLOMAX) 0.4 MG capsule 24 hr capsule Take 1 capsule by mouth Daily.     • vitamin D (ERGOCALCIFEROL) 20625 UNITS capsule capsule Take 50,000 Units by mouth 1 (One) Time Per Week.     • diltiazem LA (CARDIZEM LA) 300 MG 24 hr tablet  "Take 1 tablet by mouth Daily. 90 tablet 2     No current facility-administered medications for this visit.        Review of patient's allergies indicates no known allergies.    Past Medical History:   Diagnosis Date   • Cancer     Larnyx CA   • COPD (chronic obstructive pulmonary disease)    • Diabetes mellitus    • H/O hernia repair     Ruptured hernia repair   • History of cholecystectomy    • HTN (hypertension)    • Hypercholesterolemia    • Hypothyroidism    • IHD (ischemic heart disease)      s/p stenting   • Intracranial hemorrhage    • Rheumatic fever     as child   • Sleep apnea     on CPAP.   • Spinal cord cysts     removed   • Thyroid cyst     removed       Social History     Social History   • Marital status:      Spouse name: N/A   • Number of children: N/A   • Years of education: N/A     Occupational History   • Not on file.     Social History Main Topics   • Smoking status: Former Smoker     Quit date: 1992   • Smokeless tobacco: Current User     Types: Snuff   • Alcohol use No   • Drug use: No   • Sexual activity: Not on file     Other Topics Concern   • Not on file     Social History Narrative       Family History   Problem Relation Age of Onset   • Diabetes Mother        Review of Systems   Constitution: Negative for weakness and malaise/fatigue.   Cardiovascular: Positive for dyspnea on exertion. Negative for chest pain, irregular heartbeat, near-syncope, palpitations and syncope.   Respiratory: Positive for shortness of breath. Negative for wheezing.    Musculoskeletal: Positive for joint pain and joint swelling.   Gastrointestinal: Negative for anorexia, heartburn and nausea.   Genitourinary: Negative for dysuria, hematuria and nocturia.   Neurological: Positive for dizziness and light-headedness. Negative for numbness.   Psychiatric/Behavioral: Negative for altered mental status and depression.       DiabetesNo  Thyroidnormal    Objective     /56  Pulse 84  Ht 190.5 cm (75\")  Wt " 130 kg (286 lb)  BMI 35.75 kg/m2    Physical Exam   Constitutional: He is oriented to person, place, and time. He appears well-developed and well-nourished.   HENT:   Head: Normocephalic and atraumatic.   Eyes: EOM are normal. Pupils are equal, round, and reactive to light.   Neck: Normal range of motion. Neck supple.   Cardiovascular: Normal rate and regular rhythm.    Murmur heard.  Pulmonary/Chest: Effort normal and breath sounds normal.   Abdominal: Soft.   Musculoskeletal: Normal range of motion.   Neurological: He is alert and oriented to person, place, and time.   Skin: Skin is warm and dry.   Psychiatric: He has a normal mood and affect. His behavior is normal.       Procedures    Assessment/Plan     HR is stable as well as blood pressure.  BP is low side of normal at 100/56.  We will lower his cardizem to 300mg daily from 360.  Echo will be advised to rule out any decline in LV dysfunction, WMA, or any valvular concerns that may be attributing to shortness of breath/dizziness, he declines any further stress testing at present.  More recommendations to follow.  Labs he reports with your office and he states you recently diagnosed with diabetes, could we have a copy for our records, since he is now taking metformin and no recent AIC available for review.  He is scheduled for knee surgery in February and was advised to get clearance form to our office if cardiac clearance needed.  Good cardiac ADA diet encouraged and handout was provided with dietary guidelines for diabetic diet.  He was also encouraged to be more active and as he can increase his activity level with daily walks.  6 month follow up advised or sooner if needed.      Problems Addressed this Visit        Cardiovascular and Mediastinum    Coronary artery disease involving native coronary artery of native heart without angina pectoris    Relevant Medications    diltiazem LA (CARDIZEM LA) 300 MG 24 hr tablet    Essential hypertension    Relevant  Medications    diltiazem LA (CARDIZEM LA) 300 MG 24 hr tablet    Hypercholesteremia       Endocrine    Acquired hypothyroidism    Type 2 diabetes mellitus    Relevant Medications    metFORMIN (GLUCOPHAGE) 500 MG tablet       Other    Sleep apnea      Other Visit Diagnoses     Shortness of breath    -  Primary    Relevant Orders    Adult Transthoracic Echo Complete W/ Cont if Necessary Per Protocol    Dizziness        Relevant Orders    Adult Transthoracic Echo Complete W/ Cont if Necessary Per Protocol    COPD mixed type        Chronic pain of left knee                      Electronically signed by GITA Tejada January 8, 2018 10:34 AM

## 2018-01-10 ENCOUNTER — HOSPITAL ENCOUNTER (OUTPATIENT)
Dept: CARDIOLOGY | Facility: HOSPITAL | Age: 73
Discharge: HOME | End: 2018-01-10
Admitting: NURSE PRACTITIONER

## 2018-01-10 ENCOUNTER — OUTSIDE FACILITY SERVICE (OUTPATIENT)
Dept: CARDIOLOGY | Facility: CLINIC | Age: 73
End: 2018-01-10

## 2018-01-10 DIAGNOSIS — R06.02 SHORTNESS OF BREATH: ICD-10-CM

## 2018-01-10 DIAGNOSIS — R42 DIZZINESS: ICD-10-CM

## 2018-01-10 LAB
MAXIMAL PREDICTED HEART RATE: 147 BPM
STRESS TARGET HR: 125 BPM

## 2018-01-10 PROCEDURE — 93306 TTE W/DOPPLER COMPLETE: CPT

## 2018-01-10 PROCEDURE — 93306 TTE W/DOPPLER COMPLETE: CPT | Performed by: INTERNAL MEDICINE

## 2018-01-11 ENCOUNTER — TELEPHONE (OUTPATIENT)
Dept: CARDIOLOGY | Facility: CLINIC | Age: 73
End: 2018-01-11

## 2018-01-11 NOTE — TELEPHONE ENCOUNTER
Dr. Mati Marquez requesting cardiac clearance for Total Knee Arthroplasty.    Patient had echo yesterday.    Fax:[phone], MILLIE Diez.

## 2018-07-06 NOTE — PROGRESS NOTES
Chief Complaint   Patient presents with   • Follow-up     For cardiac management. Doesn't need refills at this time. Labs per PCP a couple of weeks ago. Was taken off of Aspirin prior to knee replacement and didn't restart, asking if he needs to go back on.        Subjective       Robbie Carmen is a 72 y.o. male  with HTN, hyperlipidemia, who diagnosed with ischemic heart disease in 1999. He underwent stenting in 2004. On 5/24/16,  cardiac workup showed no evidence of ischemia. He stopped taking some of his medication including Cardizem. Due to tachycardia it was restarted. The dose of Lisinopril was decreased. Follow up EKG showed sinus with first degree. He stopped Lisinopril and blood pressure was normal. In August 2017, he had pre-syncopal episode at Dr. ISABELLA Garg's office. He refused to go to ER. Patient then called our office as they felt it could be cardiac related and stress and echo were advised, but did not complete. He then had a brain bleed after a fall and was transferred from [facility] to [facility] for higher level of care. At his January 2018 visit, the dose of Cardizem was decreased due to lower blood pressure. He later underwent left knee replacement surgery then rehab without problems.   Today he comes to the office for a follow up visit and no cardiac concerns are voiced. He reports being more active since knee surgery and is now working a part time job which has improved his anxiety. He does admit to some mild episodes of dizziness which prompted him to decrease dose of Cymbalta with benefit.     HPI     Cardiac History:    Past Surgical History:   Procedure Laterality Date   • CARDIAC CATHETERIZATION  08/17/1999    Cath- 65% RCA.    • CARDIAC CATHETERIZATION  11/30/2004    Cath- 75% RCA. 3.5 x 32 mm Taxus Stent.    • CARDIAC CATHETERIZATION  11/17/2005    Cath- 75% in-Stent Stenosis. 3.5 x 28 mm Cypher Stent   • CARDIAC CATHETERIZATION  05/24/2011    Cath- Patent Stent in RCA. PA- 44 mmHg    •  CARDIOVASCULAR STRESS TEST  06/04/2007     Stress- 4 Min 85% THR. Inferior Infarct .     • CARDIOVASCULAR STRESS TEST  07/30/2009    Stress- 4 Min, 36 sec. 97% THR. BP- 182/92. Small lateral Ischemia.    • CARDIOVASCULAR STRESS TEST  05/10/2011    Stress- 3 min 30 sec. 86% THR. 170/82. Negative.    • CARDIOVASCULAR STRESS TEST  05/24/2016    (Mod) 7 Min, 14 Secs. 91% THR. BP- 178/88. Negative   • ECHO - CONVERTED  06/04/2007    Echo- EF 65%.    • ECHO - CONVERTED  07/30/2009    Echo- EF >60%, Mild MR.    • ECHO - CONVERTED  04/27/2010    S. Echo- 5 Min, 49 Sec. 88% THR. BP_ 178/80. Negative.    • ECHO - CONVERTED  01/10/2018    EF 65%   • US CAROTID UNILATERAL  05/11/2012    Carotid US- Normal.        Current Outpatient Prescriptions   Medication Sig Dispense Refill   • atorvastatin (LIPITOR) 40 MG tablet Take 40 mg by mouth Daily.     • diclofenac (VOLTAREN) 75 MG EC tablet Take 75 mg by mouth 2 (Two) Times a Day.     • diltiazem LA (CARDIZEM LA) 300 MG 24 hr tablet Take 1 tablet by mouth Daily. 90 tablet 2   • DULoxetine (CYMBALTA) 60 MG capsule Take 60 mg by mouth Every Other Day. Takes on days that he isn't taking flomax.     • levothyroxine (SYNTHROID, LEVOTHROID) 150 MCG tablet Take 150 mcg by mouth Daily.     • metFORMIN (GLUCOPHAGE) 500 MG tablet Take 500 mg by mouth 2 (Two) Times a Day With Meals.     • tamsulosin (FLOMAX) 0.4 MG capsule 24 hr capsule Take 1 capsule by mouth Daily.     • vitamin D (ERGOCALCIFEROL) 83584 UNITS capsule capsule Take 50,000 Units by mouth 1 (One) Time Per Week.     • aspirin 81 MG tablet Take 1 tablet by mouth Daily.       No current facility-administered medications for this visit.        Patient has no known allergies.    Past Medical History:   Diagnosis Date   • Basal cell carcinoma    • Cancer (CMS/HCC)     Larnyx CA   • COPD (chronic obstructive pulmonary disease) (CMS/HCC)    • Diabetes mellitus (CMS/HCC)    • H/O hernia repair     Ruptured hernia repair   • History of  cholecystectomy    • HTN (hypertension)    • Hypercholesterolemia    • Hypothyroidism    • IHD (ischemic heart disease)      s/p stenting   • Intracranial hemorrhage (CMS/HCC)    • Rheumatic fever     as child   • Sleep apnea     on CPAP.   • Spinal cord cysts     removed   • Thyroid cyst     removed       Social History     Social History   • Marital status:      Spouse name: N/A   • Number of children: N/A   • Years of education: N/A     Occupational History   • Not on file.     Social History Main Topics   • Smoking status: Former Smoker     Quit date: 1992   • Smokeless tobacco: Current User     Types: Snuff   • Alcohol use No   • Drug use: No   • Sexual activity: Not on file     Other Topics Concern   • Not on file     Social History Narrative   • No narrative on file       Family History   Problem Relation Age of Onset   • Diabetes Mother        Review of Systems   Constitution: Negative for decreased appetite and malaise/fatigue.   HENT: Negative for congestion, hoarse voice and nosebleeds.    Eyes: Negative for redness and visual disturbance.   Cardiovascular: Negative for claudication, leg swelling, near-syncope and palpitations.   Respiratory: Positive for sleep disturbances due to breathing (uses CPAP routinely).    Endocrine: Negative for polydipsia, polyphagia and polyuria.   Hematologic/Lymphatic: Negative for bleeding problem. Does not bruise/bleed easily.   Skin: Negative for dry skin and itching.   Musculoskeletal: Positive for joint pain. Negative for falls, muscle cramps and muscle weakness.   Gastrointestinal: Negative for change in bowel habit, heartburn and melena.   Genitourinary: Positive for frequency (managed with medication). Negative for dysuria and hematuria.        Follows with urology   Neurological: Positive for dizziness. Negative for loss of balance and numbness.   Psychiatric/Behavioral: Negative for memory loss. The patient does not have insomnia and is not nervous/anxious.  "        Objective     /78   Pulse 92   Ht 190.5 cm (75\")   Wt 128 kg (283 lb)   BMI 35.37 kg/m²     Physical Exam   Constitutional: He is oriented to person, place, and time. He appears well-developed and well-nourished. He does not appear ill. No distress.   HENT:   Head: Normocephalic.   Eyes: Conjunctivae are normal. Pupils are equal, round, and reactive to light.   Neck: Normal range of motion. Neck supple. Carotid bruit is not present.   Cardiovascular: Normal rate, regular rhythm, S1 normal and S2 normal.    No murmur heard.  Pulmonary/Chest: Effort normal and breath sounds normal. He has no wheezes. He has no rales.   Abdominal: Soft. Bowel sounds are normal. There is no tenderness.   Musculoskeletal: Normal range of motion. He exhibits no edema or tenderness.   Neurological: He is alert and oriented to person, place, and time.   Skin: Skin is warm and dry. No pallor.   Psychiatric: He has a normal mood and affect. His behavior is normal.        Procedures : none today        Assessment/Plan      Robbie was seen today for follow-up.    Diagnoses and all orders for this visit:    Coronary artery disease involving native coronary artery of native heart without angina pectoris    Essential hypertension    Hypercholesteremia    Other sleep apnea    Metabolic syndrome    Class 2 obesity due to excess calories with serious comorbidity and body mass index (BMI) of 35.0 to 35.9 in adult    Dizziness    Medication management    Other orders  -     aspirin 81 MG tablet; Take 1 tablet by mouth Daily.    He follows with you for management of labs and medication refills. He reports he is out of Vit D. I advised him to discuss dose and refills with you. Consider recommended 600 iu daily for maintenance.     Due to dizziness he backed off on dose of Cymbalta which has helped. Consider decreasing dose to 20 or 30 mg daily.     Patient's Body mass index is 35.37 kg/m². BMI is above normal parameters. Recommendations " include: nutrition counseling. Diet for weight loss encouraged. Goal for BMI < 30. He continues synthroid and metformin for management of metabolic syndrome. For cholesterol management he is taking Lipitor without issues. Continue the same.     For CAD, advised to restart daily aspirin. His vital signs are normal. Continue same dose Diltiazem. No refills needed at this time.     For management of sleep apnea, he reports using CPAP routinely without issue.     From a cardiac standpoint, Robbie appears stable. No repeat cardiac workup advised at this time. If dizziness worsens or other problems develop he understands to call. Otherwise, we will see him back in 6 months.              Electronically signed by GITA Hussein,  July 9, 2018 10:26 AM

## 2018-07-09 ENCOUNTER — OFFICE VISIT (OUTPATIENT)
Dept: CARDIOLOGY | Facility: CLINIC | Age: 73
End: 2018-07-09

## 2018-07-09 VITALS
HEART RATE: 92 BPM | HEIGHT: 75 IN | WEIGHT: 283 LBS | BODY MASS INDEX: 35.19 KG/M2 | DIASTOLIC BLOOD PRESSURE: 78 MMHG | SYSTOLIC BLOOD PRESSURE: 118 MMHG

## 2018-07-09 DIAGNOSIS — R42 DIZZINESS: ICD-10-CM

## 2018-07-09 DIAGNOSIS — G47.39 OTHER SLEEP APNEA: ICD-10-CM

## 2018-07-09 DIAGNOSIS — Z79.899 MEDICATION MANAGEMENT: ICD-10-CM

## 2018-07-09 DIAGNOSIS — E88.81 METABOLIC SYNDROME: ICD-10-CM

## 2018-07-09 DIAGNOSIS — IMO0001 CLASS 2 OBESITY DUE TO EXCESS CALORIES WITH SERIOUS COMORBIDITY AND BODY MASS INDEX (BMI) OF 35.0 TO 35.9 IN ADULT: ICD-10-CM

## 2018-07-09 DIAGNOSIS — I25.10 CORONARY ARTERY DISEASE INVOLVING NATIVE CORONARY ARTERY OF NATIVE HEART WITHOUT ANGINA PECTORIS: Primary | ICD-10-CM

## 2018-07-09 DIAGNOSIS — I10 ESSENTIAL HYPERTENSION: ICD-10-CM

## 2018-07-09 DIAGNOSIS — E78.00 HYPERCHOLESTEREMIA: ICD-10-CM

## 2018-07-09 PROCEDURE — 99213 OFFICE O/P EST LOW 20 MIN: CPT | Performed by: NURSE PRACTITIONER

## 2018-10-15 RX ORDER — DILTIAZEM HYDROCHLORIDE 300 MG/1
TABLET, EXTENDED RELEASE ORAL
Qty: 90 TABLET | Refills: 3 | Status: SHIPPED | OUTPATIENT
Start: 2018-10-15 | End: 2019-02-21 | Stop reason: DRUGHIGH

## 2018-11-12 ENCOUNTER — OFFICE VISIT (OUTPATIENT)
Dept: CARDIOLOGY | Facility: CLINIC | Age: 73
End: 2018-11-12

## 2018-11-12 VITALS
SYSTOLIC BLOOD PRESSURE: 118 MMHG | BODY MASS INDEX: 36.56 KG/M2 | HEIGHT: 75 IN | DIASTOLIC BLOOD PRESSURE: 62 MMHG | WEIGHT: 294 LBS | HEART RATE: 90 BPM

## 2018-11-12 DIAGNOSIS — E66.01 CLASS 2 SEVERE OBESITY DUE TO EXCESS CALORIES WITH SERIOUS COMORBIDITY AND BODY MASS INDEX (BMI) OF 36.0 TO 36.9 IN ADULT (HCC): ICD-10-CM

## 2018-11-12 DIAGNOSIS — R06.02 SHORTNESS OF BREATH: ICD-10-CM

## 2018-11-12 DIAGNOSIS — I25.118 CORONARY ARTERY DISEASE INVOLVING NATIVE CORONARY ARTERY OF NATIVE HEART WITH OTHER FORM OF ANGINA PECTORIS (HCC): Primary | ICD-10-CM

## 2018-11-12 DIAGNOSIS — E78.00 HYPERCHOLESTEREMIA: ICD-10-CM

## 2018-11-12 DIAGNOSIS — R42 DIZZINESS: ICD-10-CM

## 2018-11-12 DIAGNOSIS — E88.81 METABOLIC SYNDROME: ICD-10-CM

## 2018-11-12 DIAGNOSIS — G47.39 OTHER SLEEP APNEA: ICD-10-CM

## 2018-11-12 DIAGNOSIS — I10 ESSENTIAL HYPERTENSION: ICD-10-CM

## 2018-11-12 DIAGNOSIS — Z95.5 S/P RIGHT CORONARY ARTERY (RCA) STENT PLACEMENT: ICD-10-CM

## 2018-11-12 PROCEDURE — 99214 OFFICE O/P EST MOD 30 MIN: CPT | Performed by: NURSE PRACTITIONER

## 2018-11-12 RX ORDER — MECLIZINE HYDROCHLORIDE 25 MG/1
25 TABLET ORAL 3 TIMES DAILY PRN
COMMUNITY
End: 2019-08-29 | Stop reason: ALTCHOICE

## 2018-11-12 RX ORDER — ASPIRIN 325 MG
325 TABLET ORAL DAILY
COMMUNITY
End: 2019-04-29 | Stop reason: ALTCHOICE

## 2018-11-12 NOTE — PROGRESS NOTES
Chief Complaint   Patient presents with   • Follow-up     cardiac management.  in media is 9/28/18 PCP office note.  attached labs are from June 2018.   • Med Refill     no refills needed.  He has been taking ASA 325mg instead of 81 mg.  he had bottle of 325 mg and wanted to use up.   • Dizziness     PCP added meclizine at Sept. fu visit.  It has helped dizziness some.   • chest discomfort     has noticed some discomfort.  has noticed more at rest.         Subjective       Robbie Carmen is a 72 y.o. male with HTN, hyperlipidemia, who diagnosed with ischemic heart disease in 1999. He underwent stenting in 2004. On 5/24/16,  cardiac workup showed no evidence of ischemia. He stopped taking some of his medication including Cardizem. Due to tachycardia it was restarted. The dose of Lisinopril was decreased. Follow up EKG showed sinus with first degree. He stopped Lisinopril and blood pressure was normal. In August 2017, he had pre-syncopal episode at Dr. ISABELLA Garg's office. He refused to go to ER. Patient then called our office as they felt it could be cardiac related and stress and echo were advised, but did not complete. He then had a brain bleed after a fall and was transferred from [facility] to [facility] for higher level of care. At his January 2018 visit, the dose of Cardizem was decreased due to lower blood pressure. 1/10/18, echocardiogram showed normal LVEF, no AS and mild MR. He later underwent left knee replacement surgery then rehab without problems.     Today he returns to the office due to worsening shortness of breath and dizziness. His activity is now more limited due to BOOTH. No chest pain or palpitations noted. He admits to random issues with dizziness, which he has decreased dose of Cymbalta to see if has any improvement, but dizziness persist.     HPI     Cardiac History:    Past Surgical History:   Procedure Laterality Date   • CARDIAC CATHETERIZATION  08/17/1999    Cath- 65% RCA.    • CARDIAC CATHETERIZATION   11/30/2004    Cath- 75% RCA. 3.5 x 32 mm Taxus Stent.    • CARDIAC CATHETERIZATION  11/17/2005    Cath- 75% in-Stent Stenosis. 3.5 x 28 mm Cypher Stent   • CARDIAC CATHETERIZATION  05/24/2011    Cath- Patent Stent in RCA. PA- 44 mmHg    • CARDIOVASCULAR STRESS TEST  06/04/2007     Stress- 4 Min 85% THR. Inferior Infarct .     • CARDIOVASCULAR STRESS TEST  07/30/2009    Stress- 4 Min, 36 sec. 97% THR. BP- 182/92. Small lateral Ischemia.    • CARDIOVASCULAR STRESS TEST  05/10/2011    Stress- 3 min 30 sec. 86% THR. 170/82. Negative.    • CARDIOVASCULAR STRESS TEST  05/24/2016    (Mod) 7 Min, 14 Secs. 91% THR. BP- 178/88. Negative   • ECHO - CONVERTED  06/04/2007    Echo- EF 65%.    • ECHO - CONVERTED  07/30/2009    Echo- EF >60%, Mild MR.    • ECHO - CONVERTED  04/27/2010    S. Echo- 5 Min, 49 Sec. 88% THR. BP_ 178/80. Negative.    • ECHO - CONVERTED  01/10/2018    EF 65%   • US CAROTID UNILATERAL  05/11/2012    Carotid US- Normal.        Current Outpatient Medications   Medication Sig Dispense Refill   • aspirin 325 MG tablet Take 325 mg by mouth Daily.     • atorvastatin (LIPITOR) 40 MG tablet Take 40 mg by mouth Daily.     • diclofenac (VOLTAREN) 75 MG EC tablet Take 75 mg by mouth 2 (Two) Times a Day.     • DULoxetine (CYMBALTA) 60 MG capsule Take 60 mg by mouth Every Other Day.     • levothyroxine (SYNTHROID, LEVOTHROID) 150 MCG tablet Take 150 mcg by mouth Daily.     • MATZIM  MG 24 hr tablet TAKE 1 TABLET EVERY DAY 90 tablet 3   • meclizine (ANTIVERT) 25 MG tablet Take 25 mg by mouth 3 (Three) Times a Day As Needed for dizziness.     • metFORMIN (GLUCOPHAGE) 500 MG tablet Take 500 mg by mouth 2 (Two) Times a Day With Meals.     • tamsulosin (FLOMAX) 0.4 MG capsule 24 hr capsule Take 1 capsule by mouth Daily.     • vitamin D (ERGOCALCIFEROL) 47320 UNITS capsule capsule Take 50,000 Units by mouth 1 (One) Time Per Week.       No current facility-administered medications for this visit.        Patient has no  known allergies.    Past Medical History:   Diagnosis Date   • Basal cell carcinoma    • Cancer (CMS/HCC)     Larnyx CA   • COPD (chronic obstructive pulmonary disease) (CMS/HCC)    • Diabetes mellitus (CMS/HCC)    • H/O hernia repair     Ruptured hernia repair   • History of cholecystectomy    • HTN (hypertension)    • Hypercholesterolemia    • Hypothyroidism    • IHD (ischemic heart disease)      s/p stenting   • Intracranial hemorrhage (CMS/HCC)    • Rheumatic fever     as child   • Sleep apnea     on CPAP.   • Spinal cord cysts     removed   • Thyroid cyst     removed       Social History     Socioeconomic History   • Marital status:      Spouse name: Not on file   • Number of children: Not on file   • Years of education: Not on file   • Highest education level: Not on file   Social Needs   • Financial resource strain: Not on file   • Food insecurity - worry: Not on file   • Food insecurity - inability: Not on file   • Transportation needs - medical: Not on file   • Transportation needs - non-medical: Not on file   Occupational History   • Not on file   Tobacco Use   • Smoking status: Former Smoker     Last attempt to quit:      Years since quittin.8   • Smokeless tobacco: Current User     Types: Snuff   Substance and Sexual Activity   • Alcohol use: No   • Drug use: No   • Sexual activity: Not on file   Other Topics Concern   • Not on file   Social History Narrative   • Not on file       Family History   Problem Relation Age of Onset   • Diabetes Mother        Review of Systems   Constitution: Positive for malaise/fatigue. Negative for decreased appetite, diaphoresis and fever.   HENT: Positive for ear discharge (daily, not a new problem), hearing loss and hoarse voice. Negative for ear pain and nosebleeds.    Eyes: Negative for redness and visual disturbance.   Cardiovascular: Negative for chest pain, leg swelling, near-syncope and palpitations.   Respiratory: Positive for shortness of breath.  "Negative for sleep disturbances due to breathing (uses CPAP routinely).    Endocrine: Negative for polydipsia, polyphagia and polyuria.   Hematologic/Lymphatic: Negative for bleeding problem. Does not bruise/bleed easily.   Skin: Negative for color change and itching.   Musculoskeletal: Positive for joint pain. Negative for falls and muscle cramps.   Gastrointestinal: Negative for dysphagia and melena.   Genitourinary: Positive for nocturia (improved with flomax). Negative for dysuria and hematuria.   Neurological: Positive for dizziness. Negative for headaches and loss of balance.   Psychiatric/Behavioral: Negative for memory loss. The patient is nervous/anxious. The patient does not have insomnia.         Objective     /62 (BP Location: Left arm)   Pulse 90   Ht 190.5 cm (75\")   Wt 133 kg (294 lb)   BMI 36.75 kg/m²     Physical Exam   Constitutional: He is oriented to person, place, and time. Vital signs are normal. He appears well-nourished. No distress.   HENT:   Head: Normocephalic.   Eyes: Conjunctivae are normal. Pupils are equal, round, and reactive to light.   Neck: Normal range of motion. Neck supple. Carotid bruit is not present.   Cardiovascular: Normal rate, regular rhythm, S1 normal and S2 normal.   No murmur heard.  Pulmonary/Chest: Breath sounds normal.   Abdominal: Soft. Bowel sounds are normal. There is no tenderness.   Musculoskeletal: Normal range of motion. He exhibits no edema or tenderness.   Neurological: He is alert and oriented to person, place, and time.   Skin: Skin is warm and dry. No pallor.   Psychiatric: He has a normal mood and affect. His behavior is normal.        Procedures: none today        Assessment/Plan      Robbie was seen today for follow-up, med refill, dizziness and chest discomfort.    Diagnoses and all orders for this visit:    Coronary artery disease involving native coronary artery of native heart with other form of angina pectoris (CMS/HCC)  -     Stress " Test With Myocardial Perfusion One Day; Future    Essential hypertension  -     US Carotid Bilateral; Future  -     Stress Test With Myocardial Perfusion One Day; Future    Hypercholesteremia  -     US Carotid Bilateral; Future  -     Stress Test With Myocardial Perfusion One Day; Future    Metabolic syndrome  -     US Carotid Bilateral; Future  -     Stress Test With Myocardial Perfusion One Day; Future    Other sleep apnea    Shortness of breath  -     US Carotid Bilateral; Future  -     Stress Test With Myocardial Perfusion One Day; Future    Dizziness  -     US Carotid Bilateral; Future  -     Stress Test With Myocardial Perfusion One Day; Future    Class 2 severe obesity due to excess calories with serious comorbidity and body mass index (BMI) of 36.0 to 36.9 in adult (CMS/[facility])    S/P right coronary artery (RCA) stent placement    The report of his echocardiogram done in January was reviewed, LVEF was normal and no valvular concerns found.     He has worsening symptoms of shortness of breath and dizziness. To evaluate for ischemic equivalent, a nuclear stress test advised. Agree to continue 325 mg dose aspirin as he is tolerating well at this time.     It has been over 5 years since carotid ultrasound. An ultrasound ordered to assess for any stenosis causing dizziness.     His blood pressure today is normal. His heart rhythm is regular. He has high normal resting heart rate. Based on stress report, will consider adding low dose beta blocker or CCB if has increased heart rate at testing.     He admits to using CPAP routinely without problem. Continue same.     Patient's Body mass index is 36.75 kg/m². BMI is above normal parameters. Recommendations include: nutrition counseling. Diet for weight loss encouraged.     For cholesterol, he is taking Lipitor and will follow with you for management.      A 6 month follow up visit scheduled. Please call sooner for any cardiac concerns.            Electronically signed  by Bhavana Mishra, APRN,  November 12, 2018 10:10 AM

## 2018-11-14 ENCOUNTER — HOSPITAL ENCOUNTER (OUTPATIENT)
Dept: CARDIOLOGY | Facility: HOSPITAL | Age: 73
Discharge: HOME | End: 2018-11-14

## 2018-11-14 DIAGNOSIS — I10 ESSENTIAL HYPERTENSION: ICD-10-CM

## 2018-11-14 DIAGNOSIS — E78.00 HYPERCHOLESTEREMIA: ICD-10-CM

## 2018-11-14 DIAGNOSIS — I25.118 CORONARY ARTERY DISEASE INVOLVING NATIVE CORONARY ARTERY OF NATIVE HEART WITH OTHER FORM OF ANGINA PECTORIS (HCC): ICD-10-CM

## 2018-11-14 DIAGNOSIS — R42 DIZZINESS: ICD-10-CM

## 2018-11-14 DIAGNOSIS — R06.02 SHORTNESS OF BREATH: ICD-10-CM

## 2018-11-14 DIAGNOSIS — E88.81 METABOLIC SYNDROME: ICD-10-CM

## 2018-11-14 PROCEDURE — A9500 TC99M SESTAMIBI: HCPCS | Performed by: INTERNAL MEDICINE

## 2018-11-14 PROCEDURE — 78452 HT MUSCLE IMAGE SPECT MULT: CPT

## 2018-11-14 PROCEDURE — 25010000002 REGADENOSON 0.4 MG/5ML SOLUTION: Performed by: INTERNAL MEDICINE

## 2018-11-14 PROCEDURE — 0 TECHNETIUM SESTAMIBI: Performed by: INTERNAL MEDICINE

## 2018-11-14 PROCEDURE — 93017 CV STRESS TEST TRACING ONLY: CPT

## 2018-11-14 PROCEDURE — 93018 CV STRESS TEST I&R ONLY: CPT | Performed by: INTERNAL MEDICINE

## 2018-11-14 PROCEDURE — 78452 HT MUSCLE IMAGE SPECT MULT: CPT | Performed by: INTERNAL MEDICINE

## 2018-11-14 RX ADMIN — TECHNETIUM TC 99M SESTAMIBI 1 DOSE: 1 INJECTION INTRAVENOUS at 09:49

## 2018-11-14 RX ADMIN — REGADENOSON 0.4 MG: 0.08 INJECTION, SOLUTION INTRAVENOUS at 09:49

## 2018-11-16 ENCOUNTER — TELEPHONE (OUTPATIENT)
Dept: CARDIOLOGY | Facility: CLINIC | Age: 73
End: 2018-11-16

## 2018-11-16 LAB
BH CV STRESS COMMENTS STAGE 1: NORMAL
BH CV STRESS DOSE REGADENOSON STAGE 1: 0.4
BH CV STRESS DURATION MIN STAGE 1: 0
BH CV STRESS DURATION SEC STAGE 1: 10
BH CV STRESS PROTOCOL 1: NORMAL
BH CV STRESS RECOVERY BP: NORMAL MMHG
BH CV STRESS RECOVERY HR: 76 BPM
BH CV STRESS STAGE 1: 1
LV EF NUC BP: 53 %
MAXIMAL PREDICTED HEART RATE: 147 BPM
PERCENT MAX PREDICTED HR: 51.7 %
STRESS BASELINE BP: NORMAL MMHG
STRESS BASELINE HR: 63 BPM
STRESS PERCENT HR: 61 %
STRESS POST PEAK BP: NORMAL MMHG
STRESS POST PEAK HR: 76 BPM
STRESS TARGET HR: 125 BPM

## 2018-11-16 RX ORDER — ISOSORBIDE MONONITRATE 30 MG/1
30 TABLET, EXTENDED RELEASE ORAL DAILY
Qty: 90 TABLET | Refills: 3 | Status: SHIPPED | OUTPATIENT
Start: 2018-11-16 | End: 2019-04-29 | Stop reason: ALTCHOICE

## 2019-02-12 ENCOUNTER — TELEPHONE (OUTPATIENT)
Dept: CARDIOLOGY | Facility: CLINIC | Age: 74
End: 2019-02-12

## 2019-02-12 NOTE — TELEPHONE ENCOUNTER
Dr Mccormack is asking us to see patient for dizziness B/P 90/58. Next appt. 05/13/19.  See previous stress.   Does patient need sooner appt.?

## 2019-02-21 ENCOUNTER — OFFICE VISIT (OUTPATIENT)
Dept: CARDIOLOGY | Facility: CLINIC | Age: 74
End: 2019-02-21

## 2019-02-21 VITALS
HEIGHT: 75 IN | SYSTOLIC BLOOD PRESSURE: 110 MMHG | HEART RATE: 96 BPM | WEIGHT: 295 LBS | DIASTOLIC BLOOD PRESSURE: 70 MMHG | BODY MASS INDEX: 36.68 KG/M2

## 2019-02-21 DIAGNOSIS — R06.02 SHORTNESS OF BREATH: ICD-10-CM

## 2019-02-21 DIAGNOSIS — I25.9 IHD (ISCHEMIC HEART DISEASE): Primary | ICD-10-CM

## 2019-02-21 DIAGNOSIS — E88.81 METABOLIC SYNDROME: ICD-10-CM

## 2019-02-21 DIAGNOSIS — I25.110 CORONARY ARTERY DISEASE INVOLVING NATIVE CORONARY ARTERY OF NATIVE HEART WITH UNSTABLE ANGINA PECTORIS (HCC): ICD-10-CM

## 2019-02-21 DIAGNOSIS — I10 ESSENTIAL HYPERTENSION: ICD-10-CM

## 2019-02-21 DIAGNOSIS — G47.39 OTHER SLEEP APNEA: ICD-10-CM

## 2019-02-21 DIAGNOSIS — R94.39 ABNORMAL CARDIOVASCULAR STRESS TEST: ICD-10-CM

## 2019-02-21 DIAGNOSIS — R07.89 OTHER CHEST PAIN: ICD-10-CM

## 2019-02-21 DIAGNOSIS — E78.00 HYPERCHOLESTEREMIA: ICD-10-CM

## 2019-02-21 PROCEDURE — 99214 OFFICE O/P EST MOD 30 MIN: CPT | Performed by: NURSE PRACTITIONER

## 2019-02-21 RX ORDER — ARIPIPRAZOLE 5 MG/1
5 TABLET ORAL DAILY
COMMUNITY

## 2019-02-21 RX ORDER — DILTIAZEM HYDROCHLORIDE 180 MG/1
180 CAPSULE, COATED, EXTENDED RELEASE ORAL DAILY
COMMUNITY

## 2019-02-21 NOTE — PROGRESS NOTES
Chief Complaint   Patient presents with   • Follow-up     cardiac management.  PCP decreased cardizem to 180 mg secondary to hypotension.  Added abilify, and belsomra.  Pt has not taken ASA  for about 3 weeks.  He states he does bruises.   • Med Refill     pt brought med list.  no refills needed.   • Chest Pain     he has not had CP for about a week.  did occur more at rest.  lasted a few minutes.       Subjective       Robbie Carmen is a 73 y.o. male  with HTN, hyperlipidemia, who diagnosed with ischemic heart disease in 1999. He underwent stenting in 2004. On 5/24/16,  cardiac workup showed no evidence of ischemia. He stopped taking some of his medication including Cardizem. Due to tachycardia it was restarted. The dose of Lisinopril was decreased. Follow up EKG showed sinus with first degree. He stopped Lisinopril and blood pressure was normal. In August 2017, he had pre-syncopal episode at Dr. ISABELLA Garg's office. He refused to go to ER. Patient then called our office as they felt it could be cardiac related and stress and echo were advised, but did not complete. He then had a brain bleed after a fall and was transferred from [facility] to [facility] for higher level of care. At his January 2018 visit, the dose of Cardizem was decreased due to lower blood pressure. On 1/10/18, echocardiogram showed normal LVEF, no AS and mild MR. He later underwent left knee replacement surgery then rehab without problems. In November 2018, he admitted to more shortness of breath and dizziness. Carotid ultrasound did not show significant stenosis. Lexiscan stress test showed small inferior wall infarct and LVEF 53%. Imdur was prescribed with plan to proceed with cardiac cath if symptoms persisted.     Today he comes to the office as he continues to have symptoms of chest pain. It is noted the dose of diltiazem was decreased due to lower BP. Abilify and Belsomra added for treatment of depression. His daughter accompanied him and reports  "today is first day he has \"felt better\" in regards to depression. Chest pain as noted:    Chest Pain    This is a new problem. The current episode started 1 to 4 weeks ago. The onset quality is gradual. The problem occurs intermittently. The problem has been unchanged. The pain is present in the substernal region. The pain is at a severity of 4/10. The pain is mild. The quality of the pain is described as pressure. The pain does not radiate. Associated symptoms include malaise/fatigue, nausea, palpitations (fast at times) and shortness of breath (\"all the time\"). Pertinent negatives include no abdominal pain, cough, diaphoresis, dizziness, headaches or near-syncope. He has tried nothing for the symptoms.        Cardiac History:    Past Surgical History:   Procedure Laterality Date   • CARDIAC CATHETERIZATION  08/17/1999    65% RCA.    • CARDIAC CATHETERIZATION  11/30/2004     75% RCA. 3.5 x 32 mm Taxus Stent.    • CARDIAC CATHETERIZATION  11/17/2005     75% in-Stent Stenosis. 3.5 x 28 mm Cypher Stent   • CARDIAC CATHETERIZATION  05/24/2011     Patent Stent in RCA. PA- 44 mmHg    • CARDIOVASCULAR STRESS TEST  06/04/2007     Stress- 4 Min 85% THR. Inferior Infarct .     • CARDIOVASCULAR STRESS TEST  07/30/2009    Stress- 4 Min, 36 sec. 97% THR. BP- 182/92. Small lateral Ischemia.    • CARDIOVASCULAR STRESS TEST  05/10/2011    Stress- 3 min 30 sec. 86% THR. 170/82. Negative.    • CARDIOVASCULAR STRESS TEST  05/24/2016    (Mod) 7 Min, 14 Secs. 91% THR. BP- 178/88. Negative   • CARDIOVASCULAR STRESS TEST  11/14/2018    L. Cardiolite- EF 53%. Reverse Redistribution seen in the Inferior Wall   • ECHO - CONVERTED  06/04/2007    Echo- EF 65%.    • ECHO - CONVERTED  07/30/2009    Echo- EF >60%, Mild MR.    • ECHO - CONVERTED  04/27/2010    S. Echo- 5 Min, 49 Sec. 88% THR. BP_ 178/80. Negative.    • ECHO - CONVERTED  01/10/2018    EF 65%   • US CAROTID UNILATERAL  05/11/2012    Carotid US- Normal.    • US CAROTID UNILATERAL  " 11/16/2018    bilateral- no hemodynamically significant stenosis       Current Outpatient Medications   Medication Sig Dispense Refill   • ARIPiprazole (ABILIFY) 5 MG tablet Take 5 mg by mouth Daily.     • atorvastatin (LIPITOR) 40 MG tablet Take 40 mg by mouth Daily.     • diltiaZEM CD (CARDIZEM CD) 180 MG 24 hr capsule Take 180 mg by mouth Daily.     • DULoxetine (CYMBALTA) 60 MG capsule Take 60 mg by mouth Every Other Day.     • isosorbide mononitrate (IMDUR) 30 MG 24 hr tablet Take 1 tablet by mouth Daily. 90 tablet 3   • levothyroxine (SYNTHROID, LEVOTHROID) 150 MCG tablet Take 150 mcg by mouth Daily.     • meclizine (ANTIVERT) 25 MG tablet Take 25 mg by mouth 3 (Three) Times a Day As Needed for dizziness.     • metFORMIN (GLUCOPHAGE) 500 MG tablet Take 500 mg by mouth 2 (Two) Times a Day With Meals.     • Suvorexant (BELSOMRA) 10 MG tablet Take 10 mg by mouth Every Night.     • tamsulosin (FLOMAX) 0.4 MG capsule 24 hr capsule Take 1 capsule by mouth Daily.     • vitamin D (ERGOCALCIFEROL) 24327 UNITS capsule capsule Take 50,000 Units by mouth 1 (One) Time Per Week.     • aspirin 325 MG tablet Take 325 mg by mouth Daily.       No current facility-administered medications for this visit.        Patient has no known allergies.    Past Medical History:   Diagnosis Date   • Basal cell carcinoma    • Cancer (CMS/HCC)     Larnyx CA   • COPD (chronic obstructive pulmonary disease) (CMS/HCC)    • Diabetes mellitus (CMS/HCC)    • H/O hernia repair     Ruptured hernia repair   • History of cholecystectomy    • HTN (hypertension)    • Hypercholesterolemia    • Hypothyroidism    • IHD (ischemic heart disease)      s/p stenting   • Intracranial hemorrhage (CMS/HCC)    • Rheumatic fever     as child   • Sleep apnea     on CPAP.   • Spinal cord cysts     removed   • Thyroid cyst     removed       Social History     Socioeconomic History   • Marital status:      Spouse name: Not on file   • Number of children: Not on  "file   • Years of education: Not on file   • Highest education level: Not on file   Social Needs   • Financial resource strain: Not on file   • Food insecurity - worry: Not on file   • Food insecurity - inability: Not on file   • Transportation needs - medical: Not on file   • Transportation needs - non-medical: Not on file   Occupational History   • Not on file   Tobacco Use   • Smoking status: Former Smoker     Last attempt to quit:      Years since quittin.1   • Smokeless tobacco: Current User     Types: Snuff   Substance and Sexual Activity   • Alcohol use: No   • Drug use: No   • Sexual activity: Not on file   Other Topics Concern   • Not on file   Social History Narrative   • Not on file       Family History   Problem Relation Age of Onset   • Diabetes Mother        Review of Systems   Constitution: Positive for malaise/fatigue and weight gain. Negative for diaphoresis.   HENT: Negative for congestion, hoarse voice and nosebleeds.    Eyes: Negative for redness and visual disturbance.   Cardiovascular: Positive for chest pain and palpitations (fast at times). Negative for leg swelling and near-syncope.   Respiratory: Positive for shortness of breath (\"all the time\") and sleep disturbances due to breathing (uses CPAP with benefit). Negative for cough.    Endocrine: Negative for polydipsia, polyphagia and polyuria.   Hematologic/Lymphatic: Negative for bleeding problem. Does not bruise/bleed easily.   Skin: Negative for color change and itching.   Musculoskeletal: Positive for joint pain. Negative for falls and muscle cramps.   Gastrointestinal: Positive for flatus and nausea. Negative for bloating, abdominal pain, dysphagia and heartburn.   Genitourinary: Positive for nocturia. Negative for dysuria and hematuria.   Neurological: Negative for dizziness, headaches and loss of balance.   Psychiatric/Behavioral: Positive for depression and memory loss. The patient is nervous/anxious.  " "  Allergic/Immunologic: Negative for hives and persistent infections.        Objective     /70 (BP Location: Left arm)   Pulse 96   Ht 190.5 cm (75\")   Wt 134 kg (295 lb)   BMI 36.87 kg/m²     Physical Exam   Constitutional: He is oriented to person, place, and time. He appears well-nourished. No distress.   HENT:   Head: Normocephalic.   Eyes: Conjunctivae are normal. Pupils are equal, round, and reactive to light.   Neck: Normal range of motion. Neck supple. Carotid bruit is not present.   Cardiovascular: Normal rate, regular rhythm, S1 normal and S2 normal.   No murmur heard.  Pulses:       Radial pulses are 3+ on the right side, and 3+ on the left side.   Pulmonary/Chest: Effort normal and breath sounds normal. He has no wheezes. He has no rales.   Abdominal: Soft. Bowel sounds are normal. There is no tenderness.   Musculoskeletal: Normal range of motion. He exhibits no edema.   Neurological: He is alert and oriented to person, place, and time.   Skin: Skin is warm and dry.   Psychiatric: He has a normal mood and affect.        Procedures:none today        Assessment/Plan      Robbie was seen today for follow-up, med refill and chest pain.    Diagnoses and all orders for this visit:    IHD (ischemic heart disease)    Coronary artery disease involving native coronary artery of native heart with unstable angina pectoris (CMS/HCC)    Other chest pain    Shortness of breath    Other sleep apnea    Essential hypertension    Hypercholesteremia    Metabolic syndrome    Abnormal cardiovascular stress test    Other orders  -     [facility] Cath; Future      Thank you for sending copy of recent office note. As you have noted Mr. Carmen continues to have symptoms suggestive of ischemia. We reviewed his recent stress test which showed area of infarct with recommendation for cath for definitive diagnosis if symptoms persist. Mr. Carmen agrees to proceed. Order placed for cath at [facility]. Patient request radial " access if possible.     2/12/19 labs: BUN 18, creatinine 1.49, GFR 46.     For IHD, he is taking Imdur and 325 mg daily aspirin. Continue same.     His blood pressure today is stable. Agree with lower dose of Cardizem. His resting heart rate 80s-90s. He denies palpitations or lightheaded. I did not make medication change at this time.     Patient's Body mass index is 36.87 kg/m². BMI is above normal parameters. Recommendations include: nutrition counseling. Diet for weight loss information given.     Hypercholesterolemia, managed with Lipitor. 2/12/19 lab report shows , LDL 44, HDL 32 and triglycerides 207. Encouraged low carbs. Same dose Lipitor advised. AST 18.     He admits to using CPAP routinely without issues. Continue same.     A hospital follow up visit to be scheduled.             Electronically signed by GITA Hussein,  February 21, 2019 10:17 AM

## 2019-02-21 NOTE — PATIENT INSTRUCTIONS

## 2019-03-04 ENCOUNTER — OUTSIDE FACILITY SERVICE (OUTPATIENT)
Dept: CARDIOLOGY | Facility: CLINIC | Age: 74
End: 2019-03-04

## 2019-03-04 DIAGNOSIS — R94.39 ABNORMAL CARDIOVASCULAR STRESS TEST: ICD-10-CM

## 2019-03-04 DIAGNOSIS — R06.02 SHORTNESS OF BREATH: ICD-10-CM

## 2019-03-04 DIAGNOSIS — I25.9 IHD (ISCHEMIC HEART DISEASE): ICD-10-CM

## 2019-03-04 DIAGNOSIS — R07.89 OTHER CHEST PAIN: ICD-10-CM

## 2019-03-04 DIAGNOSIS — I25.110 CORONARY ARTERY DISEASE INVOLVING NATIVE CORONARY ARTERY OF NATIVE HEART WITH UNSTABLE ANGINA PECTORIS (HCC): ICD-10-CM

## 2019-03-04 PROCEDURE — 93458 L HRT ARTERY/VENTRICLE ANGIO: CPT | Performed by: INTERNAL MEDICINE

## 2019-04-29 ENCOUNTER — OFFICE VISIT (OUTPATIENT)
Dept: CARDIOLOGY | Facility: CLINIC | Age: 74
End: 2019-04-29

## 2019-04-29 VITALS
DIASTOLIC BLOOD PRESSURE: 72 MMHG | WEIGHT: 299 LBS | HEIGHT: 75 IN | BODY MASS INDEX: 37.18 KG/M2 | HEART RATE: 76 BPM | SYSTOLIC BLOOD PRESSURE: 108 MMHG

## 2019-04-29 DIAGNOSIS — I10 ESSENTIAL HYPERTENSION: ICD-10-CM

## 2019-04-29 DIAGNOSIS — E78.00 HYPERCHOLESTEREMIA: ICD-10-CM

## 2019-04-29 DIAGNOSIS — E11.9 TYPE 2 DIABETES MELLITUS WITHOUT COMPLICATION, WITHOUT LONG-TERM CURRENT USE OF INSULIN (HCC): ICD-10-CM

## 2019-04-29 DIAGNOSIS — E88.81 METABOLIC SYNDROME: ICD-10-CM

## 2019-04-29 DIAGNOSIS — G47.39 OTHER SLEEP APNEA: ICD-10-CM

## 2019-04-29 DIAGNOSIS — I25.10 CORONARY ARTERY DISEASE INVOLVING NATIVE CORONARY ARTERY OF NATIVE HEART WITHOUT ANGINA PECTORIS: Primary | ICD-10-CM

## 2019-04-29 PROCEDURE — 99214 OFFICE O/P EST MOD 30 MIN: CPT | Performed by: NURSE PRACTITIONER

## 2019-04-29 RX ORDER — ASPIRIN 81 MG/1
81 TABLET ORAL DAILY
COMMUNITY
End: 2019-08-01 | Stop reason: DRUGHIGH

## 2019-07-26 ENCOUNTER — TELEPHONE (OUTPATIENT)
Dept: CARDIOLOGY | Facility: CLINIC | Age: 74
End: 2019-07-26

## 2019-07-26 NOTE — TELEPHONE ENCOUNTER
Glo spoke with [facility].  They called to schedule an appointment stating patient had abnormal stress test yesterday.....  Glo requested report be faxed to our office for review and we will contact patient with appointment.

## 2019-07-29 NOTE — TELEPHONE ENCOUNTER
We have received notes, CT scan of chest, CXR, prelim stress test.  I called and requested final stress test report and also notes state patient had echo.  I also requested echo report.    Patient aware appointment scheduled Thursday, 8/1/19 12 noon with Bhavana.

## 2019-08-01 ENCOUNTER — OFFICE VISIT (OUTPATIENT)
Dept: CARDIOLOGY | Facility: CLINIC | Age: 74
End: 2019-08-01

## 2019-08-01 VITALS
WEIGHT: 296.4 LBS | HEART RATE: 84 BPM | DIASTOLIC BLOOD PRESSURE: 62 MMHG | BODY MASS INDEX: 36.85 KG/M2 | SYSTOLIC BLOOD PRESSURE: 128 MMHG | HEIGHT: 75 IN

## 2019-08-01 DIAGNOSIS — J44.9 CHRONIC OBSTRUCTIVE PULMONARY DISEASE, UNSPECIFIED COPD TYPE (HCC): ICD-10-CM

## 2019-08-01 DIAGNOSIS — G47.39 OTHER SLEEP APNEA: ICD-10-CM

## 2019-08-01 DIAGNOSIS — E11.9 TYPE 2 DIABETES MELLITUS WITHOUT COMPLICATION, WITHOUT LONG-TERM CURRENT USE OF INSULIN (HCC): ICD-10-CM

## 2019-08-01 DIAGNOSIS — E78.00 HYPERCHOLESTEREMIA: ICD-10-CM

## 2019-08-01 DIAGNOSIS — I25.9 IHD (ISCHEMIC HEART DISEASE): Primary | ICD-10-CM

## 2019-08-01 DIAGNOSIS — I10 ESSENTIAL HYPERTENSION: ICD-10-CM

## 2019-08-01 PROCEDURE — 99213 OFFICE O/P EST LOW 20 MIN: CPT | Performed by: NURSE PRACTITIONER

## 2019-08-01 RX ORDER — ASPIRIN 325 MG
325 TABLET ORAL DAILY
COMMUNITY
End: 2019-08-29

## 2019-08-01 RX ORDER — IPRATROPIUM BROMIDE AND ALBUTEROL SULFATE 2.5; .5 MG/3ML; MG/3ML
3 SOLUTION RESPIRATORY (INHALATION) EVERY 4 HOURS PRN
COMMUNITY

## 2019-08-01 RX ORDER — PREDNISONE 10 MG/1
10 TABLET ORAL DAILY
COMMUNITY
End: 2019-08-29 | Stop reason: ALTCHOICE

## 2019-08-01 RX ORDER — ISOSORBIDE MONONITRATE 30 MG/1
30 TABLET, EXTENDED RELEASE ORAL DAILY
Qty: 30 TABLET | Refills: 11
Start: 2019-08-01 | End: 2020-01-07

## 2019-08-01 RX ORDER — AMOXICILLIN 875 MG/1
875 TABLET, COATED ORAL 2 TIMES DAILY
COMMUNITY
End: 2020-07-23 | Stop reason: ALTCHOICE

## 2019-08-01 NOTE — PROGRESS NOTES
Chief Complaint   Patient presents with   • Follow-up     Cardiac management . Takes Aspirin 325 mg daily . Had recent hospital stay for 2 days at [facility] For diagnosis  COPD exacerbation. . Had appointment with Paige at [facility] today . .  . Had abnormal Stress test at hospital   • Med Refill     No refills needed today . Had med list with him       Subjective       Robbie Carmen is a 73 y.o. male with HTN, hyperlipidemia, who diagnosed with ischemic heart disease in 1999. He underwent stenting of RCA in 2004. On 5/24/16, cardiac workup showed no evidence of ischemia. He stopped taking some of his medication including Cardizem. Due to tachycardia it was restarted. The dose of Lisinopril was decreased. Follow up EKG showed sinus with first degree. He stopped Lisinopril and blood pressure was normal. In August 2017, he had pre-syncopal episode at Dr. ISABELLA Garg's office. He refused to go to ER. Patient then called our office as they felt it could be cardiac related and stress and echo were advised, but did not complete. He then had a brain bleed after a fall and was transferred from [facility] to [facility] for higher level of care. At his January 2018 visit, the dose of Cardizem was decreased due to lower blood pressure. On 1/10/18, echocardiogram showed normal LVEF, no AS and mild MR. He later underwent left knee replacement surgery then rehab without problems. In November 2018, he admitted to more shortness of breath and dizziness. Carotid ultrasound did not show significant stenosis. Lexiscan stress test showed small inferior wall infarct and LVEF 53%. Imdur was prescribed with plan to proceed with cardiac cath if symptoms persisted. He came back in February 2019 reporting more symptoms of chest pain and shortness of breath. It was decided to proceed with cardiac cath for definitive diagnosed which showed patent RCA stent.  He was to follow with Dr. Shaw regarding pulmonary management and  with PCP regarding management of depression.    Recently, he presented to [facility] due to dyspnea and chest discomfort.  He was admitted where he underwent CT scan and ruled out pulmonary embolism.   Cardiac work-up was done and LVEF was stable per echo. Lexiscan stress test showed inferior infarct, similar to before.  Today he returns for hospital follow-up visit.  After treatment with antibiotics he feels his shortness of breath did improve.  He denies recurrence of chest pain.  Apparently he was restarted on Imdur and is tolerating well.  He does follow with pulmonologist and recent pulmonary function test showed mild decrease in diffusion capacity compared to prior test.  He is to undergo sleep study to evaluate for BiPAP versus CPAP that he is currently using.    HPI     Cardiac History:    Past Surgical History:   Procedure Laterality Date   • CARDIAC CATHETERIZATION  08/17/1999    65% RCA.    • CARDIAC CATHETERIZATION  11/30/2004     75% RCA. 3.5 x 32 mm Taxus Stent.    • CARDIAC CATHETERIZATION  11/17/2005     75% in-Stent Stenosis. 3.5 x 28 mm Cypher Stent   • CARDIAC CATHETERIZATION  05/24/2011     Patent Stent in RCA. PA- 44 mmHg    • CARDIAC CATHETERIZATION  03/04/2019    Patent stent   • CARDIOVASCULAR STRESS TEST  06/04/2007     Stress- 4 Min 85% THR. Inferior Infarct .     • CARDIOVASCULAR STRESS TEST  07/30/2009    Stress- 4 Min, 36 sec. 97% THR. BP- 182/92. Small lateral Ischemia.    • CARDIOVASCULAR STRESS TEST  05/10/2011    Stress- 3 min 30 sec. 86% THR. 170/82. Negative.    • CARDIOVASCULAR STRESS TEST  05/24/2016    (Mod) 7 Min, 14 Secs. 91% THR. BP- 178/88. Negative   • CARDIOVASCULAR STRESS TEST  11/14/2018    L. Cardiolite- EF 53%. Reverse Redistribution seen in the Inferior Wall   • ECHO - CONVERTED  06/04/2007    Echo- EF 65%.    • ECHO - CONVERTED  07/30/2009    Echo- EF >60%, Mild MR.    • ECHO - CONVERTED  04/27/2010    S. Echo- 5 Min, 49 Sec. 88% THR. BP_ 178/80.  Negative.    • ECHO - CONVERTED  01/10/2018    EF 65%   • US CAROTID UNILATERAL  05/11/2012    Carotid US- Normal.    • US CAROTID UNILATERAL  11/16/2018    bilateral- no hemodynamically significant stenosis       Current Outpatient Medications   Medication Sig Dispense Refill   • amoxicillin (AMOXIL) 875 MG tablet Take 875 mg by mouth 2 (Two) Times a Day.     • ARIPiprazole (ABILIFY) 5 MG tablet Take 5 mg by mouth Daily.     • aspirin 325 MG tablet Take 325 mg by mouth Daily.     • atorvastatin (LIPITOR) 40 MG tablet Take 40 mg by mouth Daily.     • diltiaZEM CD (CARDIZEM CD) 180 MG 24 hr capsule Take 180 mg by mouth Daily.     • DULoxetine (CYMBALTA) 60 MG capsule Take 60 mg by mouth Every Other Day.     • Fluticasone-Umeclidin-Vilant (TRELEGY ELLIPTA) 100-62.5-25 MCG/INH aerosol powder  Inhale Daily.     • ipratropium-albuterol (DUO-NEB) 0.5-2.5 mg/3 ml nebulizer Take 3 mL by nebulization Every 4 (Four) Hours As Needed for Wheezing.     • levothyroxine (SYNTHROID, LEVOTHROID) 150 MCG tablet Take 150 mcg by mouth Daily.     • meclizine (ANTIVERT) 25 MG tablet Take 25 mg by mouth 3 (Three) Times a Day As Needed for dizziness.     • metFORMIN (GLUCOPHAGE) 500 MG tablet Take 500 mg by mouth 2 (Two) Times a Day With Meals.     • O2 (OXYGEN) Inhale 2 L/min As Needed.     • predniSONE (DELTASONE) 10 MG tablet Take 10 mg by mouth Daily. Tapered dose     • tamsulosin (FLOMAX) 0.4 MG capsule 24 hr capsule Take 1 capsule by mouth Daily.     • vitamin D (ERGOCALCIFEROL) 89488 UNITS capsule capsule Take 50,000 Units by mouth 1 (One) Time Per Week.     • isosorbide mononitrate (IMDUR) 30 MG 24 hr tablet Take 1 tablet by mouth Daily. 30 tablet 11     No current facility-administered medications for this visit.        Patient has no known allergies.    Past Medical History:   Diagnosis Date   • Basal cell carcinoma    • Cancer (CMS/HCC)     Larnyx CA   • COPD (chronic obstructive pulmonary disease) (CMS/HCC)    • Diabetes  mellitus (CMS/HCC)    • H/O hernia repair     Ruptured hernia repair   • History of cholecystectomy    • HTN (hypertension)    • Hypercholesterolemia    • Hypothyroidism    • IHD (ischemic heart disease)      s/p stenting   • Intracranial hemorrhage (CMS/HCC)    • Rheumatic fever     as child   • Sleep apnea     on CPAP.   • Spinal cord cysts     removed   • Thyroid cyst     removed       Social History     Socioeconomic History   • Marital status:      Spouse name: Not on file   • Number of children: Not on file   • Years of education: Not on file   • Highest education level: Not on file   Tobacco Use   • Smoking status: Former Smoker     Last attempt to quit:      Years since quittin.6   • Smokeless tobacco: Current User     Types: Snuff   Substance and Sexual Activity   • Alcohol use: No   • Drug use: No       Family History   Problem Relation Age of Onset   • Diabetes Mother        Review of Systems   Constitution: Positive for malaise/fatigue. Negative for decreased appetite, diaphoresis and fever.   HENT: Positive for hoarse voice (not a new problem). Negative for congestion and nosebleeds.    Eyes: Negative for redness and visual disturbance.   Cardiovascular: Positive for orthopnea. Negative for chest pain and leg swelling.   Respiratory: Positive for cough, shortness of breath and sleep disturbances due to breathing (currently using CPAP). Negative for sputum production.    Endocrine: Negative for polydipsia, polyphagia and polyuria.   Hematologic/Lymphatic: Negative for bleeding problem. Does not bruise/bleed easily.   Skin: Negative.  Negative for color change and itching.   Musculoskeletal: Positive for arthritis and joint pain (no worse). Negative for muscle cramps and myalgias.   Gastrointestinal: Negative for abdominal pain and melena.   Genitourinary: Negative for dysuria and hematuria.   Neurological: Negative for dizziness, headaches, numbness and paresthesias.  "  Psychiatric/Behavioral: Negative for altered mental status. The patient is nervous/anxious.         Objective     /62 (BP Location: Left arm)   Pulse 84   Ht 190.5 cm (75\")   Wt 134 kg (296 lb 6.4 oz)   BMI 37.05 kg/m²     Physical Exam   Constitutional: He is oriented to person, place, and time. He appears well-nourished.   HENT:   Head: Normocephalic.   Eyes: Pupils are equal, round, and reactive to light.   Neck: Normal range of motion. Carotid bruit is not present.   Cardiovascular: Normal rate, regular rhythm and S1 normal.   No murmur heard.  Pulses:       Radial pulses are 2+ on the right side, and 2+ on the left side.   Loud S2   Pulmonary/Chest: Breath sounds normal. He has no wheezes. He has no rales.   Abdominal: Soft. Bowel sounds are normal.   Musculoskeletal: Normal range of motion.   Neurological: He is alert and oriented to person, place, and time.   Skin: Skin is warm.   Psychiatric: He has a normal mood and affect.        Procedures: none today        Assessment/Plan      Robbie was seen today for follow-up and med refill.    Diagnoses and all orders for this visit:    IHD (ischemic heart disease)    Essential hypertension    Hypercholesteremia    Chronic obstructive pulmonary disease, unspecified COPD type (CMS/HCC)    Other sleep apnea    Type 2 diabetes mellitus without complication, without long-term current use of insulin (CMS/HCC)    Other orders  -     isosorbide mononitrate (IMDUR) 30 MG 24 hr tablet; Take 1 tablet by mouth Daily.    The report of his recent cardiac catheterization was reviewed which showed normal LVEF and normal coronaries with patent stent in his RCA.  The report of recent Lexiscan stress test was read by another cardiologist was reviewed which showed inferior infarct.  This is similar to prior stress test results.  He admits to improvement of symptoms.  Therefore no further cardiac testing advised at this time.  He is taking Imdur, advised to continue the " same.      Regarding shortness of breath he is following with pulmonologist.  He plans to have a repeat sleep study in the near future.  We also discussed importance of maintaining routine cleaning of CPAP machine and possible benefit of purchasing so clean or similar type cleaning device.     Patient's Body mass index is 37.05 kg/m². BMI is above normal parameters. Recommendations include: nutrition counseling.  Diet for weight loss encouraged.  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     We also discussed possible GI problems such as gastroparesis causing recent symptoms.  He currently denies heartburn and has recently elevated the head of his bed to help prevent GERD symptoms.  He declines medication but if symptoms recur consider addition of PPI or antacid.    He will keep appointment scheduled for October.  Please call sooner for any cardiac concerns.           Electronically signed by GITA Hussein,  August 1, 2019 2:47 PM

## 2019-08-27 ENCOUNTER — TELEPHONE (OUTPATIENT)
Dept: CARDIOLOGY | Facility: CLINIC | Age: 74
End: 2019-08-27

## 2019-08-29 ENCOUNTER — LAB (OUTPATIENT)
Dept: LAB | Facility: HOSPITAL | Age: 74
End: 2019-08-29

## 2019-08-29 ENCOUNTER — OFFICE VISIT (OUTPATIENT)
Dept: CARDIOLOGY | Facility: CLINIC | Age: 74
End: 2019-08-29

## 2019-08-29 VITALS
WEIGHT: 287.4 LBS | HEIGHT: 75 IN | DIASTOLIC BLOOD PRESSURE: 70 MMHG | SYSTOLIC BLOOD PRESSURE: 150 MMHG | BODY MASS INDEX: 35.73 KG/M2 | HEART RATE: 114 BPM

## 2019-08-29 DIAGNOSIS — R00.0 SINUS TACHYCARDIA: ICD-10-CM

## 2019-08-29 DIAGNOSIS — Z79.899 MEDICATION MANAGEMENT: ICD-10-CM

## 2019-08-29 DIAGNOSIS — E03.9 HYPOTHYROIDISM (ACQUIRED): ICD-10-CM

## 2019-08-29 DIAGNOSIS — D64.89 ANEMIA DUE TO OTHER CAUSE, NOT CLASSIFIED: ICD-10-CM

## 2019-08-29 DIAGNOSIS — I25.9 IHD (ISCHEMIC HEART DISEASE): ICD-10-CM

## 2019-08-29 DIAGNOSIS — E11.9 TYPE 2 DIABETES MELLITUS WITHOUT COMPLICATION, WITHOUT LONG-TERM CURRENT USE OF INSULIN (HCC): ICD-10-CM

## 2019-08-29 DIAGNOSIS — R06.02 SHORTNESS OF BREATH: ICD-10-CM

## 2019-08-29 DIAGNOSIS — I10 ESSENTIAL HYPERTENSION: ICD-10-CM

## 2019-08-29 DIAGNOSIS — E78.00 HYPERCHOLESTEREMIA: ICD-10-CM

## 2019-08-29 DIAGNOSIS — D64.89 ANEMIA DUE TO OTHER CAUSE, NOT CLASSIFIED: Primary | ICD-10-CM

## 2019-08-29 DIAGNOSIS — G47.39 OTHER SLEEP APNEA: ICD-10-CM

## 2019-08-29 LAB
ALBUMIN SERPL-MCNC: 4.3 G/DL (ref 3.5–5.2)
ALBUMIN/GLOB SERPL: 1.5 G/DL
ALP SERPL-CCNC: 117 U/L (ref 39–117)
ALT SERPL W P-5'-P-CCNC: 26 U/L (ref 1–41)
ANION GAP SERPL CALCULATED.3IONS-SCNC: 21.1 MMOL/L (ref 5–15)
AST SERPL-CCNC: 19 U/L (ref 1–40)
BASOPHILS # BLD AUTO: 0.02 10*3/MM3 (ref 0–0.2)
BASOPHILS NFR BLD AUTO: 0.5 % (ref 0–1.5)
BILIRUB SERPL-MCNC: 0.7 MG/DL (ref 0.2–1.2)
BUN BLD-MCNC: 28 MG/DL (ref 8–23)
BUN/CREAT SERPL: 19.4 (ref 7–25)
CALCIUM SPEC-SCNC: 9.8 MG/DL (ref 8.6–10.5)
CHLORIDE SERPL-SCNC: 96 MMOL/L (ref 98–107)
CO2 SERPL-SCNC: 24.9 MMOL/L (ref 22–29)
CREAT BLD-MCNC: 1.44 MG/DL (ref 0.76–1.27)
DEPRECATED RDW RBC AUTO: 56.8 FL (ref 37–54)
EOSINOPHIL # BLD AUTO: 0.09 10*3/MM3 (ref 0–0.4)
EOSINOPHIL NFR BLD AUTO: 2.1 % (ref 0.3–6.2)
ERYTHROCYTE [DISTWIDTH] IN BLOOD BY AUTOMATED COUNT: 18.2 % (ref 12.3–15.4)
GFR SERPL CREATININE-BSD FRML MDRD: 48 ML/MIN/1.73
GLOBULIN UR ELPH-MCNC: 2.8 GM/DL
GLUCOSE BLD-MCNC: 200 MG/DL (ref 65–99)
HBA1C MFR BLD: 6.7 % (ref 4.8–5.6)
HCT VFR BLD AUTO: 38.3 % (ref 37.5–51)
HGB BLD-MCNC: 12.2 G/DL (ref 13–17.7)
IMM GRANULOCYTES # BLD AUTO: 0.08 10*3/MM3 (ref 0–0.05)
IMM GRANULOCYTES NFR BLD AUTO: 1.9 % (ref 0–0.5)
LYMPHOCYTES # BLD AUTO: 1.05 10*3/MM3 (ref 0.7–3.1)
LYMPHOCYTES NFR BLD AUTO: 25.1 % (ref 19.6–45.3)
MCH RBC QN AUTO: 28 PG (ref 26.6–33)
MCHC RBC AUTO-ENTMCNC: 31.9 G/DL (ref 31.5–35.7)
MCV RBC AUTO: 88 FL (ref 79–97)
MONOCYTES # BLD AUTO: 0.57 10*3/MM3 (ref 0.1–0.9)
MONOCYTES NFR BLD AUTO: 13.6 % (ref 5–12)
NEUTROPHILS # BLD AUTO: 2.38 10*3/MM3 (ref 1.7–7)
NEUTROPHILS NFR BLD AUTO: 56.8 % (ref 42.7–76)
NT-PROBNP SERPL-MCNC: 216.4 PG/ML (ref 5–900)
PLATELET # BLD AUTO: 110 10*3/MM3 (ref 140–450)
PMV BLD AUTO: 11 FL (ref 6–12)
POTASSIUM BLD-SCNC: 4.1 MMOL/L (ref 3.5–5.2)
PROT SERPL-MCNC: 7.1 G/DL (ref 6–8.5)
RBC # BLD AUTO: 4.35 10*6/MM3 (ref 4.14–5.8)
SODIUM BLD-SCNC: 142 MMOL/L (ref 136–145)
T4 FREE SERPL-MCNC: 1.27 NG/DL (ref 0.93–1.7)
TSH SERPL DL<=0.05 MIU/L-ACNC: 7.59 UIU/ML (ref 0.27–4.2)
WBC NRBC COR # BLD: 4.19 10*3/MM3 (ref 3.4–10.8)

## 2019-08-29 PROCEDURE — 80053 COMPREHEN METABOLIC PANEL: CPT | Performed by: NURSE PRACTITIONER

## 2019-08-29 PROCEDURE — 93000 ELECTROCARDIOGRAM COMPLETE: CPT | Performed by: NURSE PRACTITIONER

## 2019-08-29 PROCEDURE — 84443 ASSAY THYROID STIM HORMONE: CPT | Performed by: NURSE PRACTITIONER

## 2019-08-29 PROCEDURE — 83036 HEMOGLOBIN GLYCOSYLATED A1C: CPT | Performed by: NURSE PRACTITIONER

## 2019-08-29 PROCEDURE — 84439 ASSAY OF FREE THYROXINE: CPT | Performed by: NURSE PRACTITIONER

## 2019-08-29 PROCEDURE — 83880 ASSAY OF NATRIURETIC PEPTIDE: CPT | Performed by: NURSE PRACTITIONER

## 2019-08-29 PROCEDURE — 99214 OFFICE O/P EST MOD 30 MIN: CPT | Performed by: NURSE PRACTITIONER

## 2019-08-29 PROCEDURE — 36415 COLL VENOUS BLD VENIPUNCTURE: CPT

## 2019-08-29 PROCEDURE — 85025 COMPLETE CBC W/AUTO DIFF WBC: CPT | Performed by: NURSE PRACTITIONER

## 2019-08-29 RX ORDER — FUROSEMIDE 80 MG
80 TABLET ORAL DAILY
COMMUNITY
End: 2019-08-29

## 2019-08-29 RX ORDER — FUROSEMIDE 40 MG/1
40 TABLET ORAL DAILY
Qty: 90 TABLET | Refills: 2 | Status: SHIPPED | OUTPATIENT
Start: 2019-08-29 | End: 2019-08-29 | Stop reason: SDUPTHER

## 2019-08-29 RX ORDER — FUROSEMIDE 40 MG/1
40 TABLET ORAL DAILY
Qty: 7 TABLET | Refills: 0 | Status: SHIPPED | OUTPATIENT
Start: 2019-08-29 | End: 2020-07-09

## 2019-08-29 NOTE — PROGRESS NOTES
Chief Complaint   Patient presents with   • Follow-up     cardiac mangement .Had recent visit to [facility] ER  . Have obtained notes. Is receiving new order for Lasix. , has only 2 tablets left. Has had follow up with Pulmonology recently. Has had 9 pound weigh loss since last office visit.   • Dizziness     Occasional episodes of dizziness with fast movement.    • Rapid Heart Rate     Increased heart rate today    • Med Refill     Had medication list with him today   • Shortness of Breath     Is having more episodes of SOA,and is more with anxiety       Subjective       Robbie Carmen is a 73 y.o. male with HTN, hyperlipidemia, who diagnosed with ischemic heart disease in 1999. He underwent stenting of RCA in 2004. On 5/24/16, cardiac workup showed no evidence of ischemia.  In August 2017, he had pre-syncopal episode at Dr. ISABELLA Garg's office. He refused to go to ER. Later, he called our office as they felt it could be cardiac related and stress and echo were advised, but did not complete. He then experienced a fall resulting in a brain bleed, hospitalized at [facility]. At his January 2018 visit, the dose of Cardizem was decreased due to lower blood pressure. On 1/10/18, echocardiogram showed normal LVEF, no AS and mild MR. He later underwent left knee replacement surgery then rehab without problems. In November 2018, he had more shortness of breath and dizziness. Carotid ultrasound did not show significant stenosis. Lexiscan stress test showed small inferior wall infarct and LVEF 53%. Imdur was prescribed with plan to proceed with cardiac cath if symptoms persisted. In February 2019, he reporting more symptoms and decided to proceed with cardiac cath which showed patent RCA stent, no intervention needed.   Plan was for him to see Dr. Shaw regarding pulmonary management and PCP regarding management of depression. In July 2019, he went to [facility] with symptoms and underwent stress test reported as abnormal.  On 8/1/19, he came to the office for hospital follow up visit.  Stress results were similar to prior stress test.  He had undergone PFT that showed mild decrease in diffusion capacity compared to prior test.  A sleep study had been scheduled to evaluate for BiPAP versus CPAP.  From a cardiac standpoint plan was to continue Imdur.  On 8/27/2019 he went to [facility] with increased shortness of breath, diaphoresis, and cough.  Clinical impression was CHF.  Lab report showed BNP 40.7, within normal limits, and troponin normal.  White count was low at 3.1.  RBC 3.85, hemoglobin 11.3, hematocrit 32.3, platelets 84.  BUN 13, creatinine 1.3.  Chest x-ray showed no acute findings.  He was discharged on Lasix 80 mg daily.    Today he comes to the office for a hospital follow-up visit.  He denies chest pain but does have increasing heart rate.  He remains short of breath and weak.  No fever, body aches or chills noted.  He denies swelling.  Last evening he did develop shortness of breath and took 40 mg of Lasix with improvement.    HPI     Cardiac History:    Past Surgical History:   Procedure Laterality Date   • CARDIAC CATHETERIZATION  08/17/1999    65% RCA.    • CARDIAC CATHETERIZATION  11/30/2004     75% RCA. 3.5 x 32 mm Taxus Stent.    • CARDIAC CATHETERIZATION  11/17/2005     75% in-Stent Stenosis. 3.5 x 28 mm Cypher Stent   • CARDIAC CATHETERIZATION  05/24/2011     Patent Stent in RCA. PA- 44 mmHg    • CARDIAC CATHETERIZATION  03/04/2019    Patent stent   • CARDIOVASCULAR STRESS TEST  06/04/2007     Stress- 4 Min 85% THR. Inferior Infarct .     • CARDIOVASCULAR STRESS TEST  07/30/2009    Stress- 4 Min, 36 sec. 97% THR. BP- 182/92. Small lateral Ischemia.    • CARDIOVASCULAR STRESS TEST  05/10/2011    Stress- 3 min 30 sec. 86% THR. 170/82. Negative.    • CARDIOVASCULAR STRESS TEST  05/24/2016    (Mod) 7 Min, 14 Secs. 91% THR. BP- 178/88. Negative   • CARDIOVASCULAR STRESS TEST  11/14/2018    L. Cardiolite- EF  53%. Reverse Redistribution seen in the Inferior Wall   • CARDIOVASCULAR STRESS TEST  07/24/2019    [facility]- Lexiscan- moderate fixed inferior defect consistent with scar with reversibility in RCA territory, EF 67%   • ECHO - CONVERTED  06/04/2007    Echo- EF 65%.    • ECHO - CONVERTED  07/30/2009    Echo- EF >60%, Mild MR.    • ECHO - CONVERTED  04/27/2010    S. Echo- 5 Min, 49 Sec. 88% THR. BP_ 178/80. Negative.    • ECHO - CONVERTED  01/10/2018    EF 65%   • ECHO - CONVERTED  07/24/2019    [facility]- LVEF >55%, mild LVH, no WMA   • US CAROTID UNILATERAL  05/11/2012    Carotid US- Normal.    • US CAROTID UNILATERAL  11/16/2018    bilateral- no hemodynamically significant stenosis       Current Outpatient Medications   Medication Sig Dispense Refill   • ARIPiprazole (ABILIFY) 5 MG tablet Take 5 mg by mouth Daily.     • atorvastatin (LIPITOR) 40 MG tablet Take 40 mg by mouth Daily.     • diltiaZEM CD (CARDIZEM CD) 180 MG 24 hr capsule Take 180 mg by mouth Daily.     • DULoxetine (CYMBALTA) 60 MG capsule Take 60 mg by mouth Every Other Day.     • Fluticasone-Umeclidin-Vilant (TRELEGY ELLIPTA) 100-62.5-25 MCG/INH aerosol powder  Inhale Daily.     • furosemide (LASIX) 80 MG tablet Take 80 mg by mouth Daily.     • ipratropium-albuterol (DUO-NEB) 0.5-2.5 mg/3 ml nebulizer Take 3 mL by nebulization Every 4 (Four) Hours As Needed for Wheezing.     • isosorbide mononitrate (IMDUR) 30 MG 24 hr tablet Take 1 tablet by mouth Daily. 30 tablet 11   • levothyroxine (SYNTHROID, LEVOTHROID) 150 MCG tablet Take 150 mcg by mouth Daily.     • metFORMIN (GLUCOPHAGE) 500 MG tablet Take 500 mg by mouth 2 (Two) Times a Day With Meals.     • O2 (OXYGEN) Inhale 2 L/min As Needed.     • tamsulosin (FLOMAX) 0.4 MG capsule 24 hr capsule Take 1 capsule by mouth Daily.     • vitamin D (ERGOCALCIFEROL) 25417 UNITS capsule capsule Take 50,000 Units by mouth 1 (One) Time Per Week.     • amoxicillin (AMOXIL) 875 MG tablet  Take 875 mg by mouth 2 (Two) Times a Day.       No current facility-administered medications for this visit.        Patient has no known allergies.    Past Medical History:   Diagnosis Date   • Basal cell carcinoma    • Cancer (CMS/HCC)     Larnyx CA   • COPD (chronic obstructive pulmonary disease) (CMS/HCC)    • Diabetes mellitus (CMS/HCC)    • H/O hernia repair     Ruptured hernia repair   • History of cholecystectomy    • HTN (hypertension)    • Hypercholesterolemia    • Hypothyroidism    • IHD (ischemic heart disease)      s/p stenting   • Intracranial hemorrhage (CMS/HCC)    • Rheumatic fever     as child   • Sleep apnea     on CPAP.   • Spinal cord cysts     removed   • Thyroid cyst     removed       Social History     Socioeconomic History   • Marital status:      Spouse name: Not on file   • Number of children: Not on file   • Years of education: Not on file   • Highest education level: Not on file   Tobacco Use   • Smoking status: Former Smoker     Last attempt to quit:      Years since quittin.6   • Smokeless tobacco: Current User     Types: Snuff   Substance and Sexual Activity   • Alcohol use: No   • Drug use: No       Family History   Problem Relation Age of Onset   • Diabetes Mother        Review of Systems   Constitution: Positive for weakness and malaise/fatigue. Negative for decreased appetite, diaphoresis and fever.   HENT: Negative for congestion and nosebleeds.    Eyes: Negative for blurred vision and redness.   Cardiovascular: Positive for dyspnea on exertion. Negative for leg swelling, near-syncope and palpitations.   Respiratory: Positive for shortness of breath and sleep disturbances due to breathing (uses CPAP). Negative for cough.    Endocrine: Negative for polydipsia, polyphagia and polyuria.   Hematologic/Lymphatic: Negative for bleeding problem. Does not bruise/bleed easily.   Skin: Negative for flushing and itching.   Musculoskeletal: Positive for arthritis and joint  "pain. Negative for falls and myalgias.   Gastrointestinal: Negative for abdominal pain, change in bowel habit, melena and nausea.   Genitourinary: Negative for dysuria and hematuria.   Neurological: Positive for difficulty with concentration and light-headedness. Negative for dizziness, headaches and loss of balance.   Psychiatric/Behavioral: Negative for altered mental status and memory loss. The patient is nervous/anxious.    Allergic/Immunologic: Negative for hives and persistent infections.        Objective      Labs 2/12/2019: Leukos 185, BUN 18, creatinine 1.49, GFR 46, sodium 142, potassium 4.3, chloride 101, calcium 9.7, total protein 7.5, albumin 4.7, alkaline phosphatase 120, AST 18, total cholesterol 117, triglycerides 207, HDL 32, LDL 44, TSH 3, T4 7.7, T3 uptake 24, WBC 9.1, RBC 5.74, hemoglobin 16.7, hematocrit 49.4, platelets 155, A1c 6.5.    /70 (BP Location: Left arm)   Pulse 114   Ht 190.5 cm (75\")   Wt 130 kg (287 lb 6.4 oz)   BMI 35.92 kg/m²     Physical Exam   Constitutional: He is oriented to person, place, and time. He appears well-nourished.   HENT:   Head: Normocephalic.   Eyes: Pupils are equal, round, and reactive to light.   Neck: Normal range of motion. Neck supple.   Cardiovascular: Regular rhythm, S1 normal and S2 normal. Tachycardia present.   No murmur heard.  Pulses:       Radial pulses are 2+ on the right side, and 2+ on the left side.   Pulmonary/Chest: Breath sounds normal. He has no wheezes. He has no rales.   Abdominal: Soft. Bowel sounds are normal. There is no tenderness.   Musculoskeletal: Normal range of motion. He exhibits no edema.   Neurological: He is alert and oriented to person, place, and time.   Skin: Skin is warm and dry. There is pallor.   Psychiatric: He has a normal mood and affect.          ECG 12 Lead  Date/Time: 8/29/2019 8:55 AM  Performed by: Bhavana Mishra APRN  Authorized by: Bhavana Mishra APRN   Comparison: compared with previous ECG from " 8/22/2017  Comparison to previous ECG: sinus  Rhythm: sinus tachycardia  Q waves: II, III and aVF                    Assessment/Plan      Robbie was seen today for follow-up, dizziness, rapid heart rate, med refill and shortness of breath.    Diagnoses and all orders for this visit:    Anemia due to other cause, not classified  -     CBC & Differential; Future    Sinus tachycardia  -     CBC & Differential; Future  -     Comprehensive Metabolic Panel; Future  -     TSH; Future  -     BNP; Future    IHD (ischemic heart disease)  -     BNP; Future    Essential hypertension    Hypercholesteremia    Other sleep apnea    Shortness of breath  -     CBC & Differential; Future  -     Comprehensive Metabolic Panel; Future  -     BNP; Future    Hypothyroidism (acquired)  -     TSH; Future  -     T4, Free; Future    Medication management  -     CBC & Differential; Future  -     Comprehensive Metabolic Panel; Future  -     TSH; Future  -     T4, Free; Future  -     BNP; Future  -     Hemoglobin A1c; Future    Type 2 diabetes mellitus without complication, without long-term current use of insulin (CMS/[facility])  -     Hemoglobin A1c; Future    Other orders  -     ECG 12 Lead    Recent lab report from ER visit reviewed which showed significant anemia, leukopenia, and thrombocytopenia.  In order to reassess CBC as well as chemistry, thyroid, A1C and BNP placed to be done today.  I have requested a copy of lab results to also be sent to you.  We will hold aspirin at this time.    Cardiac work-up done earlier this year showed normal LVEF and no ischemia.  Therefore I did not order cardiac testing at this time.  If BNP has increased we will repeat echocardiogram.  At this time I advised him to use Lasix on a as needed basis and not daily.  Further recommendations when lab results are available.    Due to increased heart rate and EKG done that shows sinus tachycardia.  Q waves noted in inferior leads.  His blood pressure is stable.  Same  dose Cardizem and Imdur advised at this time.    For management of shortness of breath I advised him to use supplemental oxygen.  Does follow with pulmonologist.    For hypercholesterolemia he is on Lipitor without issue.  Continue same.    Patient's Body mass index is 35.92 kg/m². BMI is above normal parameters. Recommendations include: nutrition counseling.     A 3-month follow-up visit scheduled.  Please call for any cardiac concerns.         Electronically signed by GITA Hussein,  August 29, 2019 9:07 AM

## 2020-01-07 RX ORDER — ISOSORBIDE MONONITRATE 30 MG/1
TABLET, EXTENDED RELEASE ORAL
Qty: 90 TABLET | Refills: 1 | Status: SHIPPED | OUTPATIENT
Start: 2020-01-07 | End: 2020-07-09

## 2020-07-09 RX ORDER — FUROSEMIDE 40 MG/1
TABLET ORAL
Qty: 90 TABLET | Refills: 3 | Status: SHIPPED | OUTPATIENT
Start: 2020-07-09 | End: 2021-12-06

## 2020-07-09 RX ORDER — ISOSORBIDE MONONITRATE 30 MG/1
TABLET, EXTENDED RELEASE ORAL
Qty: 90 TABLET | Refills: 1 | Status: SHIPPED | OUTPATIENT
Start: 2020-07-09 | End: 2021-08-03

## 2020-07-23 ENCOUNTER — OFFICE VISIT (OUTPATIENT)
Dept: CARDIOLOGY | Facility: CLINIC | Age: 75
End: 2020-07-23

## 2020-07-23 VITALS
HEART RATE: 72 BPM | TEMPERATURE: 97.9 F | WEIGHT: 280 LBS | HEIGHT: 75 IN | SYSTOLIC BLOOD PRESSURE: 108 MMHG | DIASTOLIC BLOOD PRESSURE: 70 MMHG | BODY MASS INDEX: 34.82 KG/M2

## 2020-07-23 DIAGNOSIS — E78.00 HYPERCHOLESTEREMIA: ICD-10-CM

## 2020-07-23 DIAGNOSIS — I25.118 CORONARY ARTERY DISEASE OF NATIVE ARTERY OF NATIVE HEART WITH STABLE ANGINA PECTORIS (HCC): Primary | ICD-10-CM

## 2020-07-23 DIAGNOSIS — E66.01 SEVERE OBESITY (BMI 35.0-39.9) WITH COMORBIDITY (HCC): ICD-10-CM

## 2020-07-23 DIAGNOSIS — E11.9 TYPE 2 DIABETES MELLITUS WITHOUT COMPLICATION, WITHOUT LONG-TERM CURRENT USE OF INSULIN (HCC): ICD-10-CM

## 2020-07-23 DIAGNOSIS — J41.0 SIMPLE CHRONIC BRONCHITIS (HCC): ICD-10-CM

## 2020-07-23 DIAGNOSIS — D69.6 THROMBOCYTOPENIA (HCC): ICD-10-CM

## 2020-07-23 DIAGNOSIS — G47.39 OTHER SLEEP APNEA: ICD-10-CM

## 2020-07-23 DIAGNOSIS — I10 ESSENTIAL HYPERTENSION: ICD-10-CM

## 2020-07-23 PROBLEM — S06.5XAA SUBDURAL HEMATOMA (HCC): Status: RESOLVED | Noted: 2017-08-22 | Resolved: 2020-07-23

## 2020-07-23 PROCEDURE — 99214 OFFICE O/P EST MOD 30 MIN: CPT | Performed by: NURSE PRACTITIONER

## 2020-07-23 RX ORDER — ALBUTEROL SULFATE 90 UG/1
2 AEROSOL, METERED RESPIRATORY (INHALATION) EVERY 4 HOURS PRN
COMMUNITY

## 2020-07-23 RX ORDER — ASPIRIN 325 MG
325 TABLET, DELAYED RELEASE (ENTERIC COATED) ORAL DAILY
COMMUNITY
End: 2021-01-27

## 2020-07-23 NOTE — PROGRESS NOTES
Chief Complaint   Patient presents with   • Follow-up     For cardiac management. Patient is not on aspirin. Last lab work was done on Monday per PCP, copy in door. States that he was put on a nebulizer and given rescue inhaler a week ago. States that he did have a pain in his chest this morning, patient states that he thinks it may have been indigestion because it went away.    • Med Refill     Does not need refills at this time. Brought medication list with visit.        Cardiac Complaints  none      Subjective   Robbie Carmen is a 74 y.o. male with HTN, hyperlipidemia, palpitations, COPD/sleep apnea (wearing CPAP), and ischemic heart disease in 1999. He underwent stenting of RCA in 2004. On 5/24/16, cardiac workup showed no evidence of ischemia. He stopped taking some of his medication including Cardizem. Due to tachycardia it was restarted. The dose of Lisinopril was decreased. Follow up EKG showed sinus with first degree. He stopped Lisinopril and blood pressure was normal. In August 2017, he had pre-syncopal episode at Dr. ISABELLA Garg's office. He refused to go to ER. Patient then called our office as they felt it could be cardiac related and stress and echo were advised, but did not complete. He then had a brain bleed after a fall and was transferred from [facility] to [facility] for higher level of care. At his January 2018 visit, the dose of Cardizem was decreased due to lower blood pressure. On 1/10/18, echocardiogram showed normal LVEF, no AS and mild MR. He later underwent left knee replacement surgery then rehab without problems. In November 2018, he admitted to more shortness of breath and dizziness. Carotid ultrasound did not show significant stenosis. Lexiscan stress test showed small inferior wall infarct and LVEF 53%. Imdur was prescribed with plan to proceed with cardiac cath if symptoms persisted. He came back in February 2019 reporting more symptoms of chest pain and shortness of breath. It was decided to  proceed with cardiac cath for definitive diagnosed which showed patent RCA stent.  He was to follow with Dr. Shaw regarding pulmonary management and with PCP regarding management of depression.  Most recent cardiac workup in 2019 at [facility] showed LVEF was stable per echo. Lexiscan stress test showed inferior infarct, similar to before.     He returns today for follow up and denies any new concerns. He does continue to have SOA and is on nebulizer and rescue inhaler, he if following with pulmonary in [place] in regards. He denies any chest pain except an isolated incident of indigestion. Palpitations, dizziness, and syncope denied. Fatigue is reported but he states since being back on synthroid this has improved, he had inadvertently cut it out. Labs remain followed by PCP.  Most recent workup from July 2020:  Hgb AIC 6.6%, TSH 25.200, BUN 15, Creatinine 1.35, GFR 51, Na 143, K 4.3, TRIG 168, HDL 25, LDL 25, HH 13.2/41.8, platelet 92, PSA 2.6.  NO refills needed.               Cardiac History  Past Surgical History:   Procedure Laterality Date   • CARDIAC CATHETERIZATION  08/17/1999    65% RCA.    • CARDIAC CATHETERIZATION  11/30/2004     75% RCA. 3.5 x 32 mm Taxus Stent.    • CARDIAC CATHETERIZATION  11/17/2005     75% in-Stent Stenosis. 3.5 x 28 mm Cypher Stent   • CARDIAC CATHETERIZATION  05/24/2011     Patent Stent in RCA. PA- 44 mmHg    • CARDIAC CATHETERIZATION  03/04/2019    Patent stent   • CARDIOVASCULAR STRESS TEST  06/04/2007     Stress- 4 Min 85% THR. Inferior Infarct .     • CARDIOVASCULAR STRESS TEST  07/30/2009    Stress- 4 Min, 36 sec. 97% THR. BP- 182/92. Small lateral Ischemia.    • CARDIOVASCULAR STRESS TEST  05/10/2011    Stress- 3 min 30 sec. 86% THR. 170/82. Negative.    • CARDIOVASCULAR STRESS TEST  05/24/2016    (Mod) 7 Min, 14 Secs. 91% THR. BP- 178/88. Negative   • CARDIOVASCULAR STRESS TEST  11/14/2018    L. Cardiolite- EF 53%. Reverse Redistribution seen in the Inferior Wall   •  CARDIOVASCULAR STRESS TEST  07/24/2019    [facility]- Lexiscan- moderate fixed inferior defect consistent with scar with reversibility in RCA territory, EF 67%   • ECHO - CONVERTED  06/04/2007    Echo- EF 65%.    • ECHO - CONVERTED  07/30/2009    Echo- EF >60%, Mild MR.    • ECHO - CONVERTED  04/27/2010    S. Echo- 5 Min, 49 Sec. 88% THR. BP_ 178/80. Negative.    • ECHO - CONVERTED  01/10/2018    EF 65%   • ECHO - CONVERTED  07/24/2019    [facility]- TLS- LVEF >55%, mild LVH, no WMA   • US CAROTID UNILATERAL  05/11/2012    Carotid US- Normal.    • US CAROTID UNILATERAL  11/16/2018    bilateral- no hemodynamically significant stenosis       Current Outpatient Medications   Medication Sig Dispense Refill   • albuterol sulfate  (90 Base) MCG/ACT inhaler Inhale 2 puffs Every 4 (Four) Hours As Needed for Wheezing.     • ARIPiprazole (ABILIFY) 5 MG tablet Take 5 mg by mouth Daily.     • aspirin  MG tablet Take 325 mg by mouth Daily.     • atorvastatin (LIPITOR) 40 MG tablet Take 40 mg by mouth Daily.     • diltiaZEM CD (CARDIZEM CD) 180 MG 24 hr capsule Take 180 mg by mouth Daily.     • DULoxetine (CYMBALTA) 60 MG capsule Take 60 mg by mouth Every Other Day.     • Fluticasone-Umeclidin-Vilant (TRELEGY ELLIPTA) 100-62.5-25 MCG/INH aerosol powder  Inhale Daily.     • furosemide (LASIX) 40 MG tablet TAKE 1 TABLET EVERY DAY 90 tablet 3   • ipratropium-albuterol (DUO-NEB) 0.5-2.5 mg/3 ml nebulizer Take 3 mL by nebulization Every 4 (Four) Hours As Needed for Wheezing.     • isosorbide mononitrate (IMDUR) 30 MG 24 hr tablet TAKE 1 TABLET EVERY DAY 90 tablet 1   • levothyroxine (SYNTHROID, LEVOTHROID) 150 MCG tablet Take 150 mcg by mouth Daily.     • metFORMIN (GLUCOPHAGE) 500 MG tablet Take 500 mg by mouth 2 (Two) Times a Day With Meals.     • O2 (OXYGEN) Inhale 2 L/min As Needed.     • tamsulosin (FLOMAX) 0.4 MG capsule 24 hr capsule Take 1 capsule by mouth Daily.     • vitamin D (ERGOCALCIFEROL)  73272 UNITS capsule capsule Take 50,000 Units by mouth 1 (One) Time Per Week.       No current facility-administered medications for this visit.        Patient has no known allergies.    Past Medical History:   Diagnosis Date   • Basal cell carcinoma    • Cancer (CMS/HCC)     Larnyx CA   • COPD (chronic obstructive pulmonary disease) (CMS/HCC)    • Diabetes mellitus (CMS/HCC)    • H/O hernia repair     Ruptured hernia repair   • History of cholecystectomy    • HTN (hypertension)    • Hypercholesterolemia    • Hypothyroidism    • IHD (ischemic heart disease)      s/p stenting   • Intracranial hemorrhage (CMS/HCC)    • Rheumatic fever     as child   • Sleep apnea     on CPAP.   • Spinal cord cysts     removed   • Thyroid cyst     removed       Social History     Socioeconomic History   • Marital status:      Spouse name: Not on file   • Number of children: Not on file   • Years of education: Not on file   • Highest education level: Not on file   Tobacco Use   • Smoking status: Former Smoker     Last attempt to quit:      Years since quittin.5   • Smokeless tobacco: Current User     Types: Snuff   Substance and Sexual Activity   • Alcohol use: No   • Drug use: No       Family History   Problem Relation Age of Onset   • Diabetes Mother        Review of Systems   Constitution: Negative for malaise/fatigue and night sweats.   Cardiovascular: Negative for chest pain, claudication, dyspnea on exertion, irregular heartbeat, leg swelling, near-syncope, orthopnea, palpitations and syncope.   Respiratory: Positive for shortness of breath. Negative for cough and wheezing.    Musculoskeletal: Positive for arthritis, muscle weakness and stiffness.   Gastrointestinal: Positive for heartburn. Negative for anorexia, melena, nausea and vomiting.   Genitourinary: Negative for dysuria, hematuria, hesitancy and nocturia.   Neurological: Negative for dizziness, light-headedness and loss of balance.    "  Psychiatric/Behavioral: Negative for depression and memory loss. The patient is nervous/anxious.            Objective     /70 (BP Location: Left arm)   Pulse 72   Temp 97.9 °F (36.6 °C)   Ht 190.5 cm (75\")   Wt 127 kg (280 lb)   BMI 35.00 kg/m²     Physical Exam   Constitutional: He is oriented to person, place, and time. He appears well-developed and well-nourished.   HENT:   Head: Normocephalic and atraumatic.   Eyes: Pupils are equal, round, and reactive to light. EOM are normal.   Neck: Normal range of motion. Neck supple.   Cardiovascular: Normal rate and regular rhythm.   Murmur heard.  Pulmonary/Chest: Effort normal and breath sounds normal.   Abdominal: Soft.   Musculoskeletal: Normal range of motion.   Neurological: He is alert and oriented to person, place, and time.   Skin: Skin is warm and dry.   Psychiatric: He has a normal mood and affect. His behavior is normal.       Procedures    Assessment/Plan     IHD:  Status stable. No new concerns are voiced. He continues on current ASA therapy.  If platelet count remains below 100,000 on next draw, please decrease ASA to 81mg BID. Bleeding and bruising denied.  Chest pain denied on imdur therapy.    HTN:  Blood pressure stable. No changes to current cardizem therapy advised.  Limited sodium intake advised.    Palpitations:  On cardizem therapy, limited caffeine intake advised. Same to be continued.    Edema:  Denied on current lasix therapy.    Hyperlipidemia:  On statin therapy, no adjustment to current recommended. FLP followed by your office.  He will be urged to decrease statin to 1/2 tablet on Sat and Sun as he does report some leg weakness and brain fog, LDL noted at 25.     COPD/sleep apnea:  On inahler and neb therapy.  Followed by specialty in [place].  On CPAP therapy for sleep apnea management, tolerates well.     DM:  On metformin therapy, AIC followed by your office. Most recent shows DM well controlled at 6.6%    BMI noted at 35, good " cardiac ADA diet with limited caloric intake, carbs, and activity as tolerated advised.    6 month follow up recommended or sooner if needed.     Refills sent per request.          Problems Addressed this Visit        Cardiovascular and Mediastinum    Coronary artery disease involving native coronary artery of native heart without angina pectoris - Primary    Essential hypertension    Hypercholesteremia       Respiratory    COPD (chronic obstructive pulmonary disease) (CMS/[facility])    Relevant Medications    albuterol sulfate  (90 Base) MCG/ACT inhaler    Sleep apnea       Endocrine    Type 2 diabetes mellitus (CMS/[facility])      Other Visit Diagnoses     Thrombocytopenia (CMS/[facility])        Severe obesity (BMI 35.0-39.9) with comorbidity (CMS/[facility])              Patient's Body mass index is 35 kg/m². BMI is above normal parameters. Recommendations include: nutrition counseling.              Electronically signed by GITA Tejada July 23, 2020 16:36

## 2020-07-28 ENCOUNTER — TELEPHONE (OUTPATIENT)
Dept: CARDIOLOGY | Facility: CLINIC | Age: 75
End: 2020-07-28

## 2020-07-28 NOTE — TELEPHONE ENCOUNTER
Patient called back to verify which medication he was supposed to only take 1/2 tablet of 2 days a week. He was made aware that it was atorvastatin for his cholesterol to take 1/2 tablet on Saturday and Sunday.

## 2021-01-08 ENCOUNTER — TELEPHONE (OUTPATIENT)
Dept: CARDIOLOGY | Facility: CLINIC | Age: 76
End: 2021-01-08

## 2021-01-08 NOTE — TELEPHONE ENCOUNTER
I sent in order to [facility] for Lopressor 25 mg bid. Advise to add to current medications. Monitor blood pressure and heart rate over the weekend. Call back first of week with report.

## 2021-01-08 NOTE — TELEPHONE ENCOUNTER
Patient called in said he had [facility]act [facility] from Left eye , his HR was  144 and was sent to [facility] /ER for evaluation he was given Metoprolol at that visit HR down to 95 . He reports that his HR continue to run high  .  He will require a new cardiac clearance prior to having his cataract removed.  [facility] ER notes obtained for review

## 2021-01-08 NOTE — TELEPHONE ENCOUNTER
Patient is aware to start taking Metoprolol , monitor BP and HR and to call us at the first of the week. He is aware of prescription at [facility]

## 2021-01-13 ENCOUNTER — TELEPHONE (OUTPATIENT)
Dept: CARDIOLOGY | Facility: CLINIC | Age: 76
End: 2021-01-13

## 2021-01-13 DIAGNOSIS — I25.118 CORONARY ARTERY DISEASE OF NATIVE ARTERY OF NATIVE HEART WITH STABLE ANGINA PECTORIS (HCC): Primary | ICD-10-CM

## 2021-01-13 DIAGNOSIS — R00.2 PALPITATIONS: ICD-10-CM

## 2021-01-13 DIAGNOSIS — R00.0 TACHYCARDIA: ICD-10-CM

## 2021-01-13 DIAGNOSIS — I25.118 CORONARY ARTERY DISEASE INVOLVING NATIVE CORONARY ARTERY OF NATIVE HEART WITH OTHER FORM OF ANGINA PECTORIS (HCC): ICD-10-CM

## 2021-01-13 NOTE — TELEPHONE ENCOUNTER
Patient called in to report his HR continues to fluctuate from 70 to 120 with minimal activity. He has taken all his medications this AM .  His next appointment is 1- .  He is awaiting clearance to have his other cataract removed.

## 2021-01-13 NOTE — TELEPHONE ENCOUNTER
Patient aware of orders for Holter monitor and stress test. We will call him to schedule appointment to come in for Holter monitor placement . He is aware that [facility] will call him to schedule stress test.

## 2021-01-14 ENCOUNTER — TELEPHONE (OUTPATIENT)
Dept: CARDIOLOGY | Facility: CLINIC | Age: 76
End: 2021-01-14

## 2021-01-19 ENCOUNTER — HOSPITAL ENCOUNTER (OUTPATIENT)
Dept: CARDIOLOGY | Facility: HOSPITAL | Age: 76
Discharge: HOME | End: 2021-01-19

## 2021-01-19 DIAGNOSIS — R00.2 PALPITATIONS: ICD-10-CM

## 2021-01-19 DIAGNOSIS — I25.118 CORONARY ARTERY DISEASE INVOLVING NATIVE CORONARY ARTERY OF NATIVE HEART WITH OTHER FORM OF ANGINA PECTORIS (HCC): ICD-10-CM

## 2021-01-19 DIAGNOSIS — R00.0 TACHYCARDIA: ICD-10-CM

## 2021-01-19 LAB
BH CV STRESS COMMENTS STAGE 1: NORMAL
BH CV STRESS DOSE REGADENOSON STAGE 1: 0.4
BH CV STRESS DURATION MIN STAGE 1: 0
BH CV STRESS DURATION SEC STAGE 1: 10
BH CV STRESS PROTOCOL 1: NORMAL
BH CV STRESS RECOVERY BP: NORMAL MMHG
BH CV STRESS RECOVERY HR: 92 BPM
BH CV STRESS STAGE 1: 1
LV EF NUC BP: 59 %
MAXIMAL PREDICTED HEART RATE: 144 BPM
PERCENT MAX PREDICTED HR: 70.14 %
STRESS BASELINE BP: NORMAL MMHG
STRESS BASELINE HR: 72 BPM
STRESS PERCENT HR: 83 %
STRESS POST PEAK BP: NORMAL MMHG
STRESS POST PEAK HR: 101 BPM
STRESS TARGET HR: 122 BPM

## 2021-01-19 PROCEDURE — A9500 TC99M SESTAMIBI: HCPCS | Performed by: INTERNAL MEDICINE

## 2021-01-19 PROCEDURE — 0 TECHNETIUM SESTAMIBI: Performed by: INTERNAL MEDICINE

## 2021-01-19 PROCEDURE — 78452 HT MUSCLE IMAGE SPECT MULT: CPT | Performed by: INTERNAL MEDICINE

## 2021-01-19 PROCEDURE — 93016 CV STRESS TEST SUPVJ ONLY: CPT | Performed by: NURSE PRACTITIONER

## 2021-01-19 PROCEDURE — 25010000002 REGADENOSON 0.4 MG/5ML SOLUTION: Performed by: INTERNAL MEDICINE

## 2021-01-19 PROCEDURE — 78452 HT MUSCLE IMAGE SPECT MULT: CPT

## 2021-01-19 PROCEDURE — 93017 CV STRESS TEST TRACING ONLY: CPT

## 2021-01-19 PROCEDURE — 93018 CV STRESS TEST I&R ONLY: CPT | Performed by: INTERNAL MEDICINE

## 2021-01-19 RX ADMIN — TECHNETIUM TC 99M SESTAMIBI 1 DOSE: 1 INJECTION INTRAVENOUS at 12:44

## 2021-01-19 RX ADMIN — TECHNETIUM TC 99M SESTAMIBI 1 DOSE: 1 INJECTION INTRAVENOUS at 12:46

## 2021-01-19 RX ADMIN — REGADENOSON 0.4 MG: 0.08 INJECTION, SOLUTION INTRAVENOUS at 12:46

## 2021-01-27 ENCOUNTER — OFFICE VISIT (OUTPATIENT)
Dept: CARDIOLOGY | Facility: CLINIC | Age: 76
End: 2021-01-27

## 2021-01-27 ENCOUNTER — TELEPHONE (OUTPATIENT)
Dept: CARDIOLOGY | Facility: CLINIC | Age: 76
End: 2021-01-27

## 2021-01-27 VITALS
DIASTOLIC BLOOD PRESSURE: 68 MMHG | SYSTOLIC BLOOD PRESSURE: 112 MMHG | TEMPERATURE: 96.2 F | WEIGHT: 289.6 LBS | BODY MASS INDEX: 36.01 KG/M2 | HEART RATE: 78 BPM | HEIGHT: 75 IN

## 2021-01-27 DIAGNOSIS — E78.00 HYPERCHOLESTEREMIA: ICD-10-CM

## 2021-01-27 DIAGNOSIS — G47.39 OTHER SLEEP APNEA: ICD-10-CM

## 2021-01-27 DIAGNOSIS — E11.9 TYPE 2 DIABETES MELLITUS WITHOUT COMPLICATION, WITHOUT LONG-TERM CURRENT USE OF INSULIN (HCC): ICD-10-CM

## 2021-01-27 DIAGNOSIS — I25.10 CORONARY ARTERY DISEASE INVOLVING NATIVE CORONARY ARTERY OF NATIVE HEART WITHOUT ANGINA PECTORIS: Primary | ICD-10-CM

## 2021-01-27 DIAGNOSIS — E66.01 SEVERE OBESITY (BMI 35.0-39.9) WITH COMORBIDITY (HCC): ICD-10-CM

## 2021-01-27 DIAGNOSIS — I10 ESSENTIAL HYPERTENSION: ICD-10-CM

## 2021-01-27 DIAGNOSIS — D69.6 THROMBOCYTOPENIA (HCC): ICD-10-CM

## 2021-01-27 PROCEDURE — 99214 OFFICE O/P EST MOD 30 MIN: CPT | Performed by: NURSE PRACTITIONER

## 2021-01-27 RX ORDER — ASPIRIN 81 MG/1
81 TABLET ORAL 2 TIMES DAILY
Start: 2021-01-27 | End: 2022-04-11 | Stop reason: DRUGHIGH

## 2021-01-27 NOTE — TELEPHONE ENCOUNTER
Patient states he mailed cardiac monitor Tuesday of last week. Should results be available at this point?

## 2021-01-27 NOTE — PROGRESS NOTES
Chief Complaint   Patient presents with   • Follow-up     Cardiac management   • Labs     Labs done , results on chart.   • Rapid Heart Rate     reports that his HR has been running high 70's - low 80's   • Med Refill     No refills needed. Had med list with him today.       Subjective       Robbie Carmen is a 75 y.o. male with HTN, hyperlipidemia, palpitations, COPD/sleep apnea (wearing CPAP), and ischemic heart disease in 1999. He underwent stenting of RCA in 2004. On 5/24/16, cardiac workup showed no evidence of ischemia.  In 2017, he suffered brain bleed, transferred [facility] to [facility]. IN 2018 he had repeat echocardiogram showed normal LVEF, no AS and mild MR. He later underwent left knee replacement surgery then rehab without problems. In November 2018, he had symptoms and Lexiscan stress test was repeated that showed small inferior infarct. Carotid ultrasound was negative for significant stenosis. Imdur was prescribed with plan to proceed with cardiac cath if symptoms persisted. In February 2019 , he reported chest pain and shortness of breath. Cath showed showed patent RCA stent. He then seen Dr. Shaw  pulmonary management and PCP for depression.  Repeat cardiac workup later in 2019 at [facility] showed LVEF was stable per echo. Lexiscan stress test showed inferior infarct, similar to before.    In December 2020 he underwent eye surgery and was to return for surgery on his other eye.  He had hypertension and tachycardia therefore procedure was held.  Low-dose beta-blocker in the form of Lopressor 25 twice a day was added for management.  He then underwent Lexiscan stress test on 1/19/2021 that showed inferior wall infarct again without ischemia.  A cardiac monitor was placed but report is pending.     Today returns to the office due to recent issues with hypertension and increased heart rate.  He admits to improvement of both his blood pressure and heart rate since addition of Lopressor.  He has not  had chest pain, dizziness, or significant palpitations.  Shortness of breath is no worse.  He admits to compliance with CPAP which has supplemental oxygen.       Cardiac History:    Past Surgical History:   Procedure Laterality Date   • CARDIAC CATHETERIZATION  08/17/1999    65% RCA.    • CARDIAC CATHETERIZATION  11/30/2004     75% RCA. 3.5 x 32 mm Taxus Stent.    • CARDIAC CATHETERIZATION  11/17/2005     75% in-Stent Stenosis. 3.5 x 28 mm Cypher Stent   • CARDIAC CATHETERIZATION  05/24/2011     Patent Stent in RCA. PA- 44 mmHg    • CARDIAC CATHETERIZATION  03/04/2019    Patent stent   • CARDIOVASCULAR STRESS TEST  06/04/2007     Stress- 4 Min 85% THR. Inferior Infarct .     • CARDIOVASCULAR STRESS TEST  07/30/2009    Stress- 4 Min, 36 sec. 97% THR. BP- 182/92. Small lateral Ischemia.    • CARDIOVASCULAR STRESS TEST  05/10/2011    Stress- 3 min 30 sec. 86% THR. 170/82. Negative.    • CARDIOVASCULAR STRESS TEST  05/24/2016    (Mod) 7 Min, 14 Secs. 91% THR. BP- 178/88. Negative   • CARDIOVASCULAR STRESS TEST  11/14/2018    L. Cardiolite- EF 53%. Reverse Redistribution seen in the Inferior Wall   • CARDIOVASCULAR STRESS TEST  07/24/2019    @ [facility]. Lexiscan. Inferior Infarct. EF 67%   • CARDIOVASCULAR STRESS TEST  01/19/2021    Lexiscan- EF 59%. Inferior Infarct.   • ECHO - CONVERTED  06/04/2007    Echo- EF 65%.    • ECHO - CONVERTED  07/30/2009    Echo- EF >60%, Mild MR.    • ECHO - CONVERTED  04/27/2010    S. Echo- 5 Min, 49 Sec. 88% THR. BP_ 178/80. Negative.    • ECHO - CONVERTED  01/10/2018    EF 65%   • ECHO - CONVERTED  07/24/2019    [facility]- TLS- LVEF >55%, mild LVH, no WMA   • US CAROTID UNILATERAL  05/11/2012    Carotid US- Normal.    • US CAROTID UNILATERAL  11/16/2018    bilateral- no hemodynamically significant stenosis       Current Outpatient Medications   Medication Sig Dispense Refill   • albuterol sulfate  (90 Base) MCG/ACT inhaler Inhale 2 puffs Every 4 (Four) Hours As Needed  for Wheezing.     • ARIPiprazole (ABILIFY) 5 MG tablet Take 5 mg by mouth Daily.     • atorvastatin (LIPITOR) 40 MG tablet Take 40 mg by mouth Daily.     • diltiaZEM CD (CARDIZEM CD) 180 MG 24 hr capsule Take 180 mg by mouth Daily.     • DULoxetine (CYMBALTA) 60 MG capsule Take 60 mg by mouth Every Other Day.     • Fluticasone-Umeclidin-Vilant (TRELEGY ELLIPTA) 100-62.5-25 MCG/INH aerosol powder  Inhale Daily.     • furosemide (LASIX) 40 MG tablet TAKE 1 TABLET EVERY DAY 90 tablet 3   • ipratropium-albuterol (DUO-NEB) 0.5-2.5 mg/3 ml nebulizer Take 3 mL by nebulization Every 4 (Four) Hours As Needed for Wheezing.     • isosorbide mononitrate (IMDUR) 30 MG 24 hr tablet TAKE 1 TABLET EVERY DAY 90 tablet 1   • levothyroxine (SYNTHROID, LEVOTHROID) 150 MCG tablet Take 150 mcg by mouth Daily.     • metFORMIN (GLUCOPHAGE) 500 MG tablet Take 500 mg by mouth 2 (Two) Times a Day With Meals.     • metoprolol tartrate (LOPRESSOR) 25 MG tablet Take 1 tablet by mouth 2 (Two) Times a Day. 60 tablet 11   • O2 (OXYGEN) Inhale 2 L/min As Needed.     • tamsulosin (FLOMAX) 0.4 MG capsule 24 hr capsule Take 1 capsule by mouth Daily.     • vitamin D (ERGOCALCIFEROL) 99042 UNITS capsule capsule Take 50,000 Units by mouth 1 (One) Time Per Week.     • aspirin (aspirin) 81 MG EC tablet Take 1 tablet by mouth 2 (two) times a day.       No current facility-administered medications for this visit.        Patient has no known allergies.    Past Medical History:   Diagnosis Date   • Basal cell carcinoma    • Cancer (CMS/HCC)     Larnyx CA   • COPD (chronic obstructive pulmonary disease) (CMS/HCC)    • Diabetes mellitus (CMS/HCC)    • H/O hernia repair     Ruptured hernia repair   • History of cholecystectomy    • HTN (hypertension)    • Hypercholesterolemia    • Hypothyroidism    • IHD (ischemic heart disease)      s/p stenting   • Intracranial hemorrhage (CMS/HCC)    • Rheumatic fever     as child   • Sleep apnea     on CPAP.   • Spinal cord  cysts     removed   • Thyroid cyst     removed       Social History     Socioeconomic History   • Marital status:      Spouse name: Not on file   • Number of children: Not on file   • Years of education: Not on file   • Highest education level: Not on file   Tobacco Use   • Smoking status: Former Smoker     Quit date:      Years since quittin.0   • Smokeless tobacco: Current User     Types: Snuff   Substance and Sexual Activity   • Alcohol use: No   • Drug use: No       Family History   Problem Relation Age of Onset   • Diabetes Mother        Review of Systems   Constitution: Positive for malaise/fatigue (no worse). Negative for decreased appetite and diaphoresis.   HENT: Negative for nosebleeds.    Eyes: Negative for blurred vision.   Cardiovascular: Negative for chest pain, claudication, cyanosis, dyspnea on exertion, irregular heartbeat, leg swelling, near-syncope, orthopnea, palpitations, paroxysmal nocturnal dyspnea and syncope.   Respiratory: Positive for shortness of breath and sleep disturbances due to breathing (uses CPAP and oxygen with benefit).    Endocrine: Negative for cold intolerance and heat intolerance.   Hematologic/Lymphatic: Does not bruise/bleed easily.   Skin: Negative for rash.   Musculoskeletal: Negative for myalgias.   Gastrointestinal: Negative for heartburn, melena and nausea.   Genitourinary: Negative for dysuria and hematuria.   Neurological: Negative for dizziness and light-headedness.   Psychiatric/Behavioral: Positive for depression. The patient does not have insomnia and is not nervous/anxious.         BP Readings from Last 5 Encounters:   21 112/68   20 108/70   19 150/70   19 128/62   19 108/72       Wt Readings from Last 5 Encounters:   21 131 kg (289 lb 9.6 oz)   20 127 kg (280 lb)   19 130 kg (287 lb 6.4 oz)   19 134 kg (296 lb 6.4 oz)   19 136 kg (299 lb)       Objective      Labs 2020: WBC 3.91,  "RBC 4.66, hemoglobin 13.2, hematocrit 40.8, platelets 80, troponin negative, sodium 140, potassium 3.9, chloride 107, CO2 27, glucose 157, BUN 15, creatinine 1.3, total protein 7.2, albumin 3.7, calcium 9.9, total bili is 1, AST 20, ALT 23,     July 2020:  Hgb AIC 6.6%, TSH 25.200, BUN 15, Creatinine 1.35, GFR 51, Na 143, K 4.3, TRIG 168, HDL 25, LDL 25, HH 13.2/41.8, platelet 92, PSA 2.6.     /68 (BP Location: Left arm)   Pulse 78   Temp 96.2 °F (35.7 °C)   Ht 190.5 cm (75\")   Wt 131 kg (289 lb 9.6 oz)   BMI 36.20 kg/m²     Vitals signs and nursing note reviewed.   Eyes:      Pupils: Pupils are equal, round, and reactive to light.   HENT:      Head: Normocephalic.   Neck:      Musculoskeletal: Normal range of motion.      Vascular: No carotid bruit.   Pulmonary:      Breath sounds: Normal breath sounds.   Cardiovascular:      Normal rate. Regular rhythm.   Edema:     Peripheral edema absent.   Abdominal:      General: Bowel sounds are normal.      Palpations: Abdomen is soft.   Musculoskeletal: Normal range of motion.   Skin:     General: Skin is warm.   Neurological:      Mental Status: Alert and oriented to person, place, and time.          Procedures: none today          Assessment/Plan   Diagnoses and all orders for this visit:    1. Coronary artery disease involving native coronary artery of native heart without angina pectoris (Primary)    2. Thrombocytopenia (CMS/[facility])    3. Essential hypertension    4. Hypercholesteremia    5. Other sleep apnea    6. Type 2 diabetes mellitus without complication, without long-term current use of insulin (CMS/[facility])    7. Severe obesity (BMI 35.0-39.9) with comorbidity (CMS/HCC)    Other orders  -     aspirin (aspirin) 81 MG EC tablet; Take 1 tablet by mouth 2 (two) times a day.    CAD-recent stress test results were reviewed which showed inferior wall ischemia similar to last 2 stress test.  No ischemia was noted.  Patient admits to improvement of symptoms " with recent addition of beta-blocker.  No further cardiac testing warranted at this time.     He has recently worn cardiac monitor.  I have requested a copy of the report as it is not yet available.    Thrombocytopenia-December lab report shows platelets remain low at 92.  Advised him to decrease aspirin dose from 325 mg daily to aspirin 81 mg twice a day.  He will follow with you for repeat labs.  Please forward a copy of next results when available.    Hypertension-blood pressure today is controlled.  Continue Lopressor 25 mg twice a day, Lasix 40 mg daily, and Cardizem 180 mg daily.     At this time he should be able to proceed with elective eye surgery.  Should he have increased heart rate and blood pressure day of procedure he can take an extra dose of Lopressor if needed.    Hypercholesterolemia continue Lipitor 40 mg daily.  He will follow with you for repeat labs.    Patient's Body mass index is 36.2 kg/m². BMI is above normal parameters. Recommendations include: nutrition counseling.  DASH/daibetic diet encouraged.     A 6-month follow-up visit scheduled.  Please call sooner for cardiac concerns.           Electronically signed by GITA Hussein,  January 27, 2021 11:16 EST

## 2021-01-28 ENCOUNTER — TELEPHONE (OUTPATIENT)
Dept: CARDIOLOGY | Facility: CLINIC | Age: 76
End: 2021-01-28

## 2021-01-28 NOTE — TELEPHONE ENCOUNTER
Dr. Bahena office has sent  Cardiac Clearance request for Cataract removal to Left eye .  He is taking Aspirin 81 mg daily  Stress test 1-  EF 59%

## 2021-07-06 NOTE — TELEPHONE ENCOUNTER
Received 90 day refill request for Metoprolol tartrate 25mg bid from [facility] pharmacy.

## 2021-08-03 RX ORDER — ISOSORBIDE MONONITRATE 30 MG/1
TABLET, EXTENDED RELEASE ORAL
Qty: 90 TABLET | Refills: 1 | Status: SHIPPED | OUTPATIENT
Start: 2021-08-03 | End: 2021-08-17 | Stop reason: SDUPTHER

## 2021-08-17 ENCOUNTER — OFFICE VISIT (OUTPATIENT)
Dept: CARDIOLOGY | Facility: CLINIC | Age: 76
End: 2021-08-17

## 2021-08-17 VITALS
HEART RATE: 70 BPM | BODY MASS INDEX: 35.06 KG/M2 | WEIGHT: 282 LBS | HEIGHT: 75 IN | SYSTOLIC BLOOD PRESSURE: 122 MMHG | DIASTOLIC BLOOD PRESSURE: 64 MMHG

## 2021-08-17 DIAGNOSIS — E78.00 HYPERCHOLESTEREMIA: ICD-10-CM

## 2021-08-17 DIAGNOSIS — I10 ESSENTIAL HYPERTENSION: ICD-10-CM

## 2021-08-17 DIAGNOSIS — E88.81 METABOLIC SYNDROME: ICD-10-CM

## 2021-08-17 DIAGNOSIS — G47.39 OTHER SLEEP APNEA: ICD-10-CM

## 2021-08-17 DIAGNOSIS — J41.0 SIMPLE CHRONIC BRONCHITIS (HCC): ICD-10-CM

## 2021-08-17 DIAGNOSIS — I25.10 CORONARY ARTERY DISEASE INVOLVING NATIVE CORONARY ARTERY OF NATIVE HEART WITHOUT ANGINA PECTORIS: Primary | ICD-10-CM

## 2021-08-17 PROCEDURE — 99213 OFFICE O/P EST LOW 20 MIN: CPT | Performed by: NURSE PRACTITIONER

## 2021-08-17 RX ORDER — ISOSORBIDE MONONITRATE 30 MG/1
30 TABLET, EXTENDED RELEASE ORAL DAILY
Qty: 90 TABLET | Refills: 3 | Status: SHIPPED | OUTPATIENT
Start: 2021-08-17 | End: 2022-09-13

## 2021-08-17 NOTE — PROGRESS NOTES
Chief Complaint   Patient presents with   • Follow-up     Cardiac management . Has no cardiac complaints today.   • LABS     No current labs .PCP to draw labs soon ( about 2 weeks )   • Med Refill     Needs refills on Imdur and Metoprolol 90 day supply to [facility] pharmacy. Had med list today       Subjective       Robbie Carmen is a 75 y.o. male  with HTN, hyperlipidemia, palpitations, COPD/sleep apnea (wearing CPAP), and ischemic heart disease in 1999. He underwent stenting of RCA in 2004. On 5/24/16, cardiac workup showed no evidence of ischemia.  In 2017, he suffered brain bleed, transferred from [facility] to [facility]. IN 2018 he had repeat echocardiogram showed normal LVEF, no AS and mild MR. He later underwent left knee replacement surgery then rehab without problems. In November 2018, he had symptoms and Lexiscan stress test was repeated that showed small inferior infarct. Carotid ultrasound was negative for significant stenosis. Imdur was prescribed with plan to proceed with cardiac cath if symptoms persisted. In February 2019 , he reported chest pain and shortness of breath. Cath showed showed patent RCA stent. He then seen Dr. Shaw  pulmonary management and PCP for depression.  Repeat cardiac workup later in 2019 at [facility] showed LVEF was stable per echo. Lexiscan stress test showed inferior infarct, similar to before.     In December 2020 he underwent eye surgery and was to return for surgery on his other eye.  He had hypertension and tachycardia therefore procedure was held.  Low-dose beta-blocker in the form of Lopressor 25 twice a day was added for management.  He then underwent Lexiscan stress test on 1/19/2021 that showed inferior wall infarct again without ischemia.  A cardiac monitor was placed with only 7 short runs SVT noted. Beta blocker continued.     Today he comes to the office for a follow up visit. He is now using BiPAP with some improvement of symptoms. No cardiac concerns are  voiced. He remains fatigued but no chest pain, palpitations, edema or increase in shortness of breath.       Cardiac History:    Past Surgical History:   Procedure Laterality Date   • CARDIAC CATHETERIZATION  08/17/1999    65% RCA.    • CARDIAC CATHETERIZATION  11/30/2004     75% RCA. 3.5 x 32 mm Taxus Stent.    • CARDIAC CATHETERIZATION  11/17/2005     75% in-Stent Stenosis. 3.5 x 28 mm Cypher Stent   • CARDIAC CATHETERIZATION  05/24/2011     Patent Stent in RCA. PA- 44 mmHg    • CARDIAC CATHETERIZATION  03/04/2019    Patent stent   • CARDIOVASCULAR STRESS TEST  06/04/2007     Stress- 4 Min 85% THR. Inferior Infarct .     • CARDIOVASCULAR STRESS TEST  07/30/2009    Stress- 4 Min, 36 sec. 97% THR. BP- 182/92. Small lateral Ischemia.    • CARDIOVASCULAR STRESS TEST  05/10/2011    Stress- 3 min 30 sec. 86% THR. 170/82. Negative.    • CARDIOVASCULAR STRESS TEST  05/24/2016    (Mod) 7 Min, 14 Secs. 91% THR. BP- 178/88. Negative   • CARDIOVASCULAR STRESS TEST  11/14/2018    L. Cardiolite- EF 53%. Reverse Redistribution seen in the Inferior Wall   • CARDIOVASCULAR STRESS TEST  07/24/2019    @ [facility]. Lexiscan. Inferior Infarct. EF 67%   • CARDIOVASCULAR STRESS TEST  01/19/2021    Lexiscan- EF 59%. Inferior Infarct.   • ECHO - CONVERTED  06/04/2007    Echo- EF 65%.    • ECHO - CONVERTED  07/30/2009    Echo- EF >60%, Mild MR.    • ECHO - CONVERTED  04/27/2010    S. Echo- 5 Min, 49 Sec. 88% THR. BP_ 178/80. Negative.    • ECHO - CONVERTED  01/10/2018    EF 65%   • ECHO - CONVERTED  07/24/2019    [facility]- [facility]- LVEF >55%, mild LVH, no WMA   • US CAROTID UNILATERAL  05/11/2012    Carotid US- Normal.    • US CAROTID UNILATERAL  11/16/2018    bilateral- no hemodynamically significant stenosis       Current Outpatient Medications   Medication Sig Dispense Refill   • albuterol sulfate  (90 Base) MCG/ACT inhaler Inhale 2 puffs Every 4 (Four) Hours As Needed for Wheezing.     • ARIPiprazole (ABILIFY) 5 MG  tablet Take 5 mg by mouth Daily.     • aspirin (aspirin) 81 MG EC tablet Take 1 tablet by mouth 2 (two) times a day.     • atorvastatin (LIPITOR) 40 MG tablet Take 40 mg by mouth Daily.     • diltiaZEM CD (CARDIZEM CD) 180 MG 24 hr capsule Take 180 mg by mouth Daily.     • DULoxetine (CYMBALTA) 60 MG capsule Take 60 mg by mouth Every Other Day.     • Fluticasone-Umeclidin-Vilant (TRELEGY ELLIPTA) 100-62.5-25 MCG/INH aerosol powder  Inhale Daily.     • furosemide (LASIX) 40 MG tablet TAKE 1 TABLET EVERY DAY 90 tablet 3   • ipratropium-albuterol (DUO-NEB) 0.5-2.5 mg/3 ml nebulizer Take 3 mL by nebulization Every 4 (Four) Hours As Needed for Wheezing.     • isosorbide mononitrate (IMDUR) 30 MG 24 hr tablet Take 1 tablet by mouth Daily. 90 tablet 3   • levothyroxine (SYNTHROID, LEVOTHROID) 175 MCG tablet Take 175 mcg by mouth Daily.     • metFORMIN (GLUCOPHAGE) 500 MG tablet Take 500 mg by mouth 2 (Two) Times a Day With Meals.     • metoprolol tartrate (LOPRESSOR) 25 MG tablet Take 1 tablet by mouth 2 (Two) Times a Day. 90 tablet 3   • O2 (OXYGEN) Inhale 2 L/min As Needed.     • tamsulosin (FLOMAX) 0.4 MG capsule 24 hr capsule Take 1 capsule by mouth Daily.     • vitamin D (ERGOCALCIFEROL) 38559 UNITS capsule capsule Take 50,000 Units by mouth 1 (One) Time Per Week.       No current facility-administered medications for this visit.       Patient has no known allergies.    Past Medical History:   Diagnosis Date   • Basal cell carcinoma    • Cancer (CMS/HCC)     Larnyx CA   • COPD (chronic obstructive pulmonary disease) (CMS/HCC)    • Diabetes mellitus (CMS/HCC)    • H/O hernia repair     Ruptured hernia repair   • History of cholecystectomy    • HTN (hypertension)    • Hypercholesterolemia    • Hypothyroidism    • IHD (ischemic heart disease)      s/p stenting   • Intracranial hemorrhage (CMS/HCC)    • Rheumatic fever     as child   • Sleep apnea     on CPAP.   • Spinal cord cysts     removed   • Thyroid cyst     removed  "      Social History     Socioeconomic History   • Marital status:      Spouse name: Not on file   • Number of children: Not on file   • Years of education: Not on file   • Highest education level: Not on file   Tobacco Use   • Smoking status: Former Smoker     Quit date:      Years since quittin.6   • Smokeless tobacco: Current User     Types: Snuff   Vaping Use   • Vaping Use: Never used   Substance and Sexual Activity   • Alcohol use: No   • Drug use: No       Family History   Problem Relation Age of Onset   • Diabetes Mother        Review of Systems   Constitutional: Positive for malaise/fatigue. Negative for decreased appetite and diaphoresis.   HENT: Negative for nosebleeds.    Eyes: Negative for blurred vision.   Cardiovascular: Positive for dyspnea on exertion. Negative for chest pain, claudication, cyanosis, irregular heartbeat, leg swelling, near-syncope, orthopnea, palpitations, paroxysmal nocturnal dyspnea and syncope.   Respiratory: Positive for cough (\"dry\"), shortness of breath and sleep disturbances due to breathing (uses BiPAP with oxygen). Negative for sputum production.    Endocrine: Negative for cold intolerance and heat intolerance.   Hematologic/Lymphatic: Negative for bleeding problem. Bruises/bleeds easily.   Skin: Negative for rash.   Musculoskeletal: Positive for arthritis. Negative for falls and myalgias.   Gastrointestinal: Negative for heartburn, melena and nausea.   Genitourinary: Negative for dysuria and hematuria.   Neurological: Negative for dizziness and light-headedness.   Psychiatric/Behavioral: The patient does not have insomnia and is not nervous/anxious.         BP Readings from Last 5 Encounters:   21 122/64   21 112/68   20 108/70   19 150/70   19 128/62       Wt Readings from Last 5 Encounters:   21 128 kg (282 lb)   21 131 kg (289 lb 9.6 oz)   20 127 kg (280 lb)   19 130 kg (287 lb 6.4 oz)   19 134 " "kg (296 lb 6.4 oz)       Objective     /64 (BP Location: Left arm)   Pulse 70   Ht 190.5 cm (75\")   Wt 128 kg (282 lb)   BMI 35.25 kg/m²     Vitals and nursing note reviewed.   Eyes:      Pupils: Pupils are equal, round, and reactive to light.   HENT:      Head: Normocephalic.   Pulmonary:      Effort: Pulmonary effort is normal.      Breath sounds: Decreased breath sounds present. No wheezing. No rales.   Cardiovascular:      Normal rate. Regular rhythm.   Edema:     Peripheral edema absent.   Abdominal:      General: Bowel sounds are normal.      Palpations: Abdomen is soft.   Musculoskeletal: Normal range of motion.      Cervical back: Normal range of motion. Skin:     General: Skin is warm.   Neurological:      Mental Status: Alert and oriented to person, place, and time.          Procedures: none today          Assessment/Plan   Diagnoses and all orders for this visit:    1. Coronary artery disease involving native coronary artery of native heart without angina pectoris (Primary)  -     isosorbide mononitrate (IMDUR) 30 MG 24 hr tablet; Take 1 tablet by mouth Daily.  Dispense: 90 tablet; Refill: 3    2. Essential hypertension  -     metoprolol tartrate (LOPRESSOR) 25 MG tablet; Take 1 tablet by mouth 2 (Two) Times a Day.  Dispense: 90 tablet; Refill: 3    3. Hypercholesteremia    4. Metabolic syndrome    5. Simple chronic bronchitis (CMS/HCC)    6. Other sleep apnea         CAD-stress test done earlier in the year was negative for ischemia and showed normal LVEF.  He presents today without cardiac complaints or concerns.  Continue statin and aspirin.    Hypertension-blood pressure normal.  Continue Cardizem  mg daily and Lasix 40 mg daily and Imdur.    Hypercholesterolemia-on Lipitor.  He will follow with you for lab orders/management.    Metabolic syndrome-Patient's Body mass index is 35.25 kg/m². indicating that he is obese (BMI >30). Obesity-related health conditions include the following: " hypertension and dyslipidemias. Obesity is unchanged. BMI is is above average; BMI management plan is completed. We discussed low calorie, low carb based diet program and portion control..     COPD/sleep apnea-followed by pulmonologist.  He is compliant with BiPAP therapy and supplemental oxygen with benefit.    6-month follow-up visit scheduled.  Please call sooner for cardiac concerns.             Electronically signed by GITA Hussein,  August 17, 2021 13:30 EDT

## 2021-12-06 RX ORDER — FUROSEMIDE 40 MG/1
TABLET ORAL
Qty: 90 TABLET | Refills: 3 | Status: SHIPPED | OUTPATIENT
Start: 2021-12-06 | End: 2022-04-11

## 2022-04-11 ENCOUNTER — OFFICE VISIT (OUTPATIENT)
Dept: CARDIOLOGY | Facility: CLINIC | Age: 77
End: 2022-04-11

## 2022-04-11 VITALS
SYSTOLIC BLOOD PRESSURE: 116 MMHG | DIASTOLIC BLOOD PRESSURE: 70 MMHG | BODY MASS INDEX: 32.63 KG/M2 | HEART RATE: 70 BPM | WEIGHT: 262.4 LBS | HEIGHT: 75 IN

## 2022-04-11 DIAGNOSIS — G47.39 OTHER SLEEP APNEA: ICD-10-CM

## 2022-04-11 DIAGNOSIS — I25.10 CORONARY ARTERY DISEASE INVOLVING NATIVE CORONARY ARTERY OF NATIVE HEART WITHOUT ANGINA PECTORIS: Primary | ICD-10-CM

## 2022-04-11 DIAGNOSIS — E88.81 METABOLIC SYNDROME: ICD-10-CM

## 2022-04-11 DIAGNOSIS — E78.00 HYPERCHOLESTEREMIA: ICD-10-CM

## 2022-04-11 DIAGNOSIS — I10 ESSENTIAL HYPERTENSION: ICD-10-CM

## 2022-04-11 PROCEDURE — 99213 OFFICE O/P EST LOW 20 MIN: CPT | Performed by: NURSE PRACTITIONER

## 2022-04-11 RX ORDER — LORAZEPAM 1 MG/1
1 TABLET ORAL NIGHTLY
COMMUNITY

## 2022-04-11 RX ORDER — MULTIPLE VITAMINS W/ MINERALS TAB 9MG-400MCG
1 TAB ORAL DAILY
COMMUNITY

## 2022-04-11 RX ORDER — PRAMIPEXOLE DIHYDROCHLORIDE 0.25 MG/1
0.25 TABLET ORAL DAILY
COMMUNITY

## 2022-04-11 RX ORDER — FORMOTEROL FUMARATE 20 UG/2ML
SOLUTION RESPIRATORY (INHALATION)
COMMUNITY

## 2022-04-11 RX ORDER — ASPIRIN 325 MG
325 TABLET, DELAYED RELEASE (ENTERIC COATED) ORAL DAILY
COMMUNITY

## 2022-04-11 NOTE — PROGRESS NOTES
Subjective   Robbie Carmen is a 76 y.o. male with HTN, hyperlipidemia, palpitations, COPD/sleep apnea (wearing CPAP), and ischemic heart disease in 1999. He underwent stenting of RCA in 2004. On 5/24/16, cardiac workup showed no evidence of ischemia.  In 2017, he suffered brain bleed, transferred from [facility] to [facility]. IN 2018 he had repeat echocardiogram showed normal LVEF, no AS and mild MR. He later underwent left knee replacement surgery then rehab without problems. In November 2018, he had symptoms and Lexiscan stress test was repeated that showed small inferior infarct. Carotid ultrasound was negative for significant stenosis. Imdur was prescribed with plan to proceed with cardiac cath if symptoms persisted. In February 2019 , he reported chest pain and shortness of breath. Cath showed showed patent RCA stent. He then seen Dr. Shaw  for pulmonary management and PCP for depression.  Repeat cardiac workup later in 2019 at [facility] showed LVEF was stable per echo. Lexiscan stress test showed inferior infarct, similar to before.     IN December 2020 he underwent eye surgery and was to return for surgery on his other eye.  He had hypertension and tachycardia therefore procedure was held.  Low-dose beta-blocker in the form of Lopressor 25 twice a day was added for management.  He then underwent Lexiscan stress test on 1/19/2021 that showed inferior wall infarct again without ischemia.  A cardiac monitor was placed with only 7 short runs SVT noted. Beta blocker continued. At August 2021 visit he reported using BiPAP with improvement of symptoms.     Today he returns to the office for a follow up visit.  He denies recent cardiac symptoms of concern.  Over the last 6 months, he admits to 3 episodes of feeling epigastric/mid chest area burning that occurred while sitting and watching TV.  He did not have to take nitroglycerin.  Due to low platelet count he is following with hematologist routinely.  He is on antiplatelet  "therapy in the form of aspirin.  According to patient most recent platelet count had improved.  At night he has had difficulty with his BiPAP mask therefore he has been using oxygen only but does have a new strap ordered in hopes to return to using BiPAP in the near future.    Chief Complaint   Patient presents with   • Follow-up     Cardiac managment   • Chest Pain     Describes having a \"burning \" type chest pain in mid chest . He has noticed this a couple times lately . Says it does not last long .   • LABS     Had labs per PCP recently , had low platelets and was referred to  .    • Med Refill     No refills needed today. Had med list today       Initial cardiac evaluation;    HPI    Cardiac History  Past Surgical History:   Procedure Laterality Date   • CARDIAC CATHETERIZATION  08/17/1999    65% RCA.    • CARDIAC CATHETERIZATION  11/30/2004     75% RCA. 3.5 x 32 mm Taxus Stent.    • CARDIAC CATHETERIZATION  11/17/2005     75% in-Stent Stenosis. 3.5 x 28 mm Cypher Stent   • CARDIAC CATHETERIZATION  05/24/2011     Patent Stent in RCA. PA- 44 mmHg    • CARDIAC CATHETERIZATION  03/04/2019    Patent stent   • CARDIOVASCULAR STRESS TEST  06/04/2007     Stress- 4 Min 85% THR. Inferior Infarct .     • CARDIOVASCULAR STRESS TEST  07/30/2009    Stress- 4 Min, 36 sec. 97% THR. BP- 182/92. Small lateral Ischemia.    • CARDIOVASCULAR STRESS TEST  05/10/2011    Stress- 3 min 30 sec. 86% THR. 170/82. Negative.    • CARDIOVASCULAR STRESS TEST  05/24/2016    (Mod) 7 Min, 14 Secs. 91% THR. BP- 178/88. Negative   • CARDIOVASCULAR STRESS TEST  11/14/2018    L. Cardiolite- EF 53%. Reverse Redistribution seen in the Inferior Wall   • CARDIOVASCULAR STRESS TEST  07/24/2019    @ [facility]. Lexiscan. Inferior Infarct. EF 67%   • CARDIOVASCULAR STRESS TEST  01/19/2021    Lexiscan- EF 59%. Inferior Infarct.   • ECHO - CONVERTED  06/04/2007    Echo- EF 65%.    • ECHO - CONVERTED  07/30/2009    Echo- EF >60%, Mild MR.    • ECHO - " CONVERTED  04/27/2010    S. Echo- 5 Min, 49 Sec. 88% THR. BP_ 178/80. Negative.    • ECHO - CONVERTED  01/10/2018    EF 65%   • ECHO - CONVERTED  07/24/2019    [facility]- TLS- LVEF >55%, mild LVH, no WMA   • US CAROTID UNILATERAL  05/11/2012    Carotid US- Normal.    • US CAROTID UNILATERAL  11/16/2018    bilateral- no hemodynamically significant stenosis       Current Outpatient Medications   Medication Sig Dispense Refill   • albuterol sulfate  (90 Base) MCG/ACT inhaler Inhale 2 puffs Every 4 (Four) Hours As Needed for Wheezing.     • ARIPiprazole (ABILIFY) 5 MG tablet Take 5 mg by mouth Daily.     • aspirin  MG tablet Take 325 mg by mouth Daily.     • atorvastatin (LIPITOR) 40 MG tablet Take 40 mg by mouth Daily.     • diltiaZEM CD (CARDIZEM CD) 180 MG 24 hr capsule Take 180 mg by mouth Daily.     • DULoxetine (CYMBALTA) 60 MG capsule Take 60 mg by mouth Every Other Day.     • Fluticasone-Umeclidin-Vilant (TRELEGY) 100-62.5-25 MCG/INH inhaler Inhale Daily.     • formoterol (Perforomist) 20 MCG/2ML nebulizer solution Take  by nebulization 2 (Two) Times a Day.     • ipratropium-albuterol (DUO-NEB) 0.5-2.5 mg/3 ml nebulizer Take 3 mL by nebulization Every 4 (Four) Hours As Needed for Wheezing.     • isosorbide mononitrate (IMDUR) 30 MG 24 hr tablet Take 1 tablet by mouth Daily. 90 tablet 3   • levothyroxine (SYNTHROID, LEVOTHROID) 175 MCG tablet Take 175 mcg by mouth Daily.     • LORazepam (ATIVAN) 1 MG tablet Take 1 mg by mouth Every Night.     • metFORMIN (GLUCOPHAGE) 500 MG tablet Take 500 mg by mouth 2 (Two) Times a Day With Meals.     • metoprolol tartrate (LOPRESSOR) 25 MG tablet Take 1 tablet by mouth 2 (Two) Times a Day. 90 tablet 3   • multivitamin with minerals (PRESERVISION AREDS PO) Take 1 tablet by mouth Daily.     • O2 (OXYGEN) Inhale 2 L/min As Needed.     • pramipexole (MIRAPEX) 0.25 MG tablet Take 0.25 mg by mouth Daily.     • tamsulosin (FLOMAX) 0.4 MG capsule 24 hr capsule  Take 1 capsule by mouth Daily.     • vitamin D (ERGOCALCIFEROL) 68709 UNITS capsule capsule Take 50,000 Units by mouth 1 (One) Time Per Week.       No current facility-administered medications for this visit.       Patient has no known allergies.    Past Medical History:   Diagnosis Date   • Basal cell carcinoma    • Cancer (HCC)     Larnyx CA   • COPD (chronic obstructive pulmonary disease) (HCC)    • Diabetes mellitus (HCC)    • H/O hernia repair     Ruptured hernia repair   • History of cholecystectomy    • HTN (hypertension)    • Hypercholesterolemia    • Hypothyroidism    • IHD (ischemic heart disease)      s/p stenting   • Intracranial hemorrhage (HCC)    • Rheumatic fever     as child   • Sleep apnea     on CPAP.   • Spinal cord cysts     removed   • Thyroid cyst     removed       Social History     Socioeconomic History   • Marital status:    Tobacco Use   • Smoking status: Former Smoker     Quit date:      Years since quittin.2   • Smokeless tobacco: Current User     Types: Snuff   Vaping Use   • Vaping Use: Never used   Substance and Sexual Activity   • Alcohol use: No   • Drug use: No       Family History   Problem Relation Age of Onset   • Diabetes Mother        Review of Systems   Constitutional: Positive for appetite change and fatigue.   HENT: Negative for congestion, nosebleeds, sinus pressure and trouble swallowing.    Respiratory: Positive for apnea (uses oxygen or BiPAP for sleep apnea) and shortness of breath. Negative for cough.    Cardiovascular: Negative for leg swelling.   Gastrointestinal: Negative for abdominal distention, abdominal pain, nausea and vomiting.   Endocrine: Negative for polydipsia, polyphagia and polyuria.   Genitourinary: Negative for hematuria.   Musculoskeletal: Positive for arthralgias.   Skin: Negative.    Neurological: Positive for dizziness (when first get up after sitting for awhile) and tremors.   Psychiatric/Behavioral: Negative for confusion. The  "patient is nervous/anxious.        BP Readings from Last 5 Encounters:   04/11/22 116/70   08/17/21 122/64   01/27/21 112/68   07/23/20 108/70   08/29/19 150/70       Wt Readings from Last 5 Encounters:   04/11/22 119 kg (262 lb 6.4 oz)   08/17/21 128 kg (282 lb)   01/27/21 131 kg (289 lb 9.6 oz)   07/23/20 127 kg (280 lb)   08/29/19 130 kg (287 lb 6.4 oz)          Objective     /70 (BP Location: Left arm)   Pulse 70   Ht 190.5 cm (75\")   Wt 119 kg (262 lb 6.4 oz)   BMI 32.80 kg/m²     Physical Exam  Vitals and nursing note reviewed.   HENT:      Mouth/Throat:      Pharynx: Oropharynx is clear.   Neck:      Vascular: No carotid bruit or JVD.   Cardiovascular:      Rate and Rhythm: Normal rate and regular rhythm.   Pulmonary:      Effort: Pulmonary effort is normal.      Breath sounds: No wheezing, rhonchi or rales.   Abdominal:      General: Bowel sounds are normal. There is no distension.      Palpations: Abdomen is soft.      Tenderness: There is no abdominal tenderness.   Musculoskeletal:         General: No swelling.   Skin:     General: Skin is warm and dry.   Neurological:      Mental Status: He is alert.   Psychiatric:         Behavior: Behavior normal.         Procedures: none today     Assessment/Plan     Diagnoses and all orders for this visit:    1. Coronary artery disease involving native coronary artery of native heart without angina pectoris (Primary)    2. Essential hypertension    3. Hypercholesteremia    4. Other sleep apnea    5. Metabolic syndrome    CAD-anginal symptoms denied.  Stress test done January 2021 was negative for ischemia.  Should he develop more \"chest burning\" that he understands to contact the office.  Currently he has nitro spray that is up-to-date.  Continue aspirin, beta-blocker, and statin.    Hypertension-blood pressure controlled.  Continue current antihypertensive agents.    Hypercholesterolemia-on Lipitor.  Please forward copy of next lab results.    EDDIE- uses " BiPAP but having difficulty with seal and is awaiting a part.      Metabolic syndrome-his weight is down about 20 pounds.  He admits to decrease in appetite but not appetite loss.  I encouraged him on maintaining diabetic diet.    Thrombocytopenia-following with hematologist.    Currently patient appears stable from a cardiac standpoint.  A 6-month follow-up visit scheduled.  Please call sooner for cardiac concerns.           Transcribed from ambient dictation for GITA Hussein by GITA Hussein.  04/11/22   07:26 EDT

## 2022-05-22 DIAGNOSIS — I10 ESSENTIAL HYPERTENSION: ICD-10-CM

## 2022-05-23 ENCOUNTER — TELEPHONE (OUTPATIENT)
Dept: CARDIOLOGY | Facility: CLINIC | Age: 77
End: 2022-05-23

## 2022-05-23 ENCOUNTER — CLINICAL SUPPORT (OUTPATIENT)
Dept: CARDIOLOGY | Facility: CLINIC | Age: 77
End: 2022-05-23

## 2022-05-23 DIAGNOSIS — Z79.899 MEDICATION MANAGEMENT: ICD-10-CM

## 2022-05-23 DIAGNOSIS — I10 ESSENTIAL HYPERTENSION: ICD-10-CM

## 2022-05-23 DIAGNOSIS — R00.0 TACHYCARDIA: Primary | ICD-10-CM

## 2022-05-23 DIAGNOSIS — R00.2 PALPITATIONS: ICD-10-CM

## 2022-05-23 PROCEDURE — 93000 ELECTROCARDIOGRAM COMPLETE: CPT | Performed by: NURSE PRACTITIONER

## 2022-05-23 RX ORDER — ROFLUMILAST 250 UG/1
250 TABLET ORAL DAILY
Qty: 28 TABLET | Refills: 0 | Status: SHIPPED | OUTPATIENT
Start: 2022-05-23

## 2022-05-23 NOTE — TELEPHONE ENCOUNTER
Patient , his daughter Becky and his wife Yesi aware of prescriptions sent to [facility] . They are aware  to monitor HR and if no improvement to call us back .

## 2022-05-23 NOTE — PROGRESS NOTES
Procedure     ECG 12 Lead    Date/Time: 5/23/2022 2:19 PM  Performed by: Bhavana Mishra APRN  Authorized by: Bhavana Mishra APRN   Comparison: compared with previous ECG from 2/22/2020  Comparison to previous ECG: sinus  Rhythm: sinus tachycardia  BPM: 132

## 2022-05-23 NOTE — TELEPHONE ENCOUNTER
Patient at  office , they are reporting HR of 165.  He is in today for EKG. He reports that he has been out of his Metoprolol for a couple days .   His wife said he has been more fatigued and SOA lately. He also has diagnosis of COPD.   They are aware to call if HR does not improve after restarting medication Metoprolol  per your verbal order.

## 2022-05-23 NOTE — TELEPHONE ENCOUNTER
I sent refills for Metoprolol and Daliresp to [facility] pharmacy. EKG showed sinus tach.

## 2022-09-12 DIAGNOSIS — I25.10 CORONARY ARTERY DISEASE INVOLVING NATIVE CORONARY ARTERY OF NATIVE HEART WITHOUT ANGINA PECTORIS: ICD-10-CM

## 2022-09-13 RX ORDER — ISOSORBIDE MONONITRATE 30 MG/1
TABLET, EXTENDED RELEASE ORAL
Qty: 90 TABLET | Refills: 0 | Status: SHIPPED | OUTPATIENT
Start: 2022-09-13 | End: 2023-01-09

## 2022-10-19 ENCOUNTER — OFFICE VISIT (OUTPATIENT)
Dept: CARDIOLOGY | Facility: CLINIC | Age: 77
End: 2022-10-19

## 2022-10-19 VITALS
DIASTOLIC BLOOD PRESSURE: 72 MMHG | HEIGHT: 75 IN | BODY MASS INDEX: 32.13 KG/M2 | WEIGHT: 258.4 LBS | SYSTOLIC BLOOD PRESSURE: 128 MMHG | HEART RATE: 74 BPM

## 2022-10-19 DIAGNOSIS — R25.1 TREMOR: ICD-10-CM

## 2022-10-19 DIAGNOSIS — E78.00 HYPERCHOLESTEREMIA: ICD-10-CM

## 2022-10-19 DIAGNOSIS — G47.39 OTHER SLEEP APNEA: ICD-10-CM

## 2022-10-19 DIAGNOSIS — I10 ESSENTIAL HYPERTENSION: ICD-10-CM

## 2022-10-19 DIAGNOSIS — I25.10 CORONARY ARTERY DISEASE INVOLVING NATIVE CORONARY ARTERY OF NATIVE HEART WITHOUT ANGINA PECTORIS: Primary | ICD-10-CM

## 2022-10-19 DIAGNOSIS — E11.9 TYPE 2 DIABETES MELLITUS WITHOUT COMPLICATION, WITHOUT LONG-TERM CURRENT USE OF INSULIN: ICD-10-CM

## 2022-10-19 DIAGNOSIS — Z79.899 MEDICATION MANAGEMENT: ICD-10-CM

## 2022-10-19 PROCEDURE — 99214 OFFICE O/P EST MOD 30 MIN: CPT | Performed by: NURSE PRACTITIONER

## 2022-10-19 RX ORDER — PREDNISONE 20 MG/1
20 TABLET ORAL AS NEEDED
COMMUNITY

## 2022-10-19 RX ORDER — PROPRANOLOL HYDROCHLORIDE 20 MG/1
TABLET ORAL
Qty: 90 TABLET | Refills: 6 | Status: SHIPPED | OUTPATIENT
Start: 2022-10-19 | End: 2023-02-28 | Stop reason: SDUPTHER

## 2022-10-19 NOTE — PROGRESS NOTES
Chief Complaint   Patient presents with   • Follow-up     Cardiac management. Had consult with Rheumatology , they have rulled out [place] montgomerylandon . He has referral  for Tremors to Neurology at [facility] in [place] in December 2022    • Shortness of Breath     Is SOA with exertion, he has diagnosis of COPD and uses oxygen PRN   • LABS     Has monthly per  , unsure if has had Lipid panel    • Med Refill     No refills needed today. Had med list        Subjective       Robbie Carmen is a 76 y.o. male  with HTN, hyperlipidemia, palpitations, COPD/sleep apnea (wearing CPAP), and ischemic heart disease in 1999. He underwent stenting of RCA in 2004.  In 2017, he suffered brain bleed, transferred from [facility] to [facility]. IN 2018 he had repeat echocardiogram showed normal LVEF, no AS and mild MR. 2019 cardiac cath showed patent RCA stent.  On 1/19/2021 repeat stress test showed inferior wall infarct without ischemia which was similar to prior.  Cardiac monitor revealed 7 short runs of SVT beta-blocker continued.  He later reported improvement in symptoms since using BiPAP.    Today he returns to the office for a follow up visit.  From a cardiac standpoint he denies recent symptoms or concerns.  Blood pressure and heart rate have been well controlled.  His main concern is development of tremor in his right extremity.  He is following routinely with Dr. Linton regarding abnormal blood count.  He is scheduled to see neurologist in December in regards to tremor.  He admits to decrease in appetite but also very sedentary and naps quite often.    Cardiac History:    Past Surgical History:   Procedure Laterality Date   • CARDIAC CATHETERIZATION  08/17/1999    65% RCA.    • CARDIAC CATHETERIZATION  11/30/2004     75% RCA. 3.5 x 32 mm Taxus Stent.    • CARDIAC CATHETERIZATION  11/17/2005     75% in-Stent Stenosis. 3.5 x 28 mm Cypher Stent   • CARDIAC CATHETERIZATION  05/24/2011     Patent Stent in RCA. PA- 44 mmHg    •  CARDIAC CATHETERIZATION  03/04/2019    Patent stent   • CARDIOVASCULAR STRESS TEST  06/04/2007     Stress- 4 Min 85% THR. Inferior Infarct .     • CARDIOVASCULAR STRESS TEST  07/30/2009    Stress- 4 Min, 36 sec. 97% THR. BP- 182/92. Small lateral Ischemia.    • CARDIOVASCULAR STRESS TEST  05/10/2011    Stress- 3 min 30 sec. 86% THR. 170/82. Negative.    • CARDIOVASCULAR STRESS TEST  05/24/2016    (Mod) 7 Min, 14 Secs. 91% THR. BP- 178/88. Negative   • CARDIOVASCULAR STRESS TEST  11/14/2018    L. Cardiolite- EF 53%. Reverse Redistribution seen in the Inferior Wall   • CARDIOVASCULAR STRESS TEST  07/24/2019    @ [facility]. Lexiscan. Inferior Infarct. EF 67%   • CARDIOVASCULAR STRESS TEST  01/19/2021    Lexiscan- EF 59%. Inferior Infarct.   • ECHO - CONVERTED  06/04/2007    Echo- EF 65%.    • ECHO - CONVERTED  07/30/2009    Echo- EF >60%, Mild MR.    • ECHO - CONVERTED  04/27/2010    S. Echo- 5 Min, 49 Sec. 88% THR. BP_ 178/80. Negative.    • ECHO - CONVERTED  01/10/2018    EF 65%   • ECHO - CONVERTED  07/24/2019    [facility]- TLS- LVEF >55%, mild LVH, no WMA   • US CAROTID UNILATERAL  05/11/2012    Carotid US- Normal.    • US CAROTID UNILATERAL  11/16/2018    bilateral- no hemodynamically significant stenosis       Current Outpatient Medications   Medication Sig Dispense Refill   • albuterol sulfate  (90 Base) MCG/ACT inhaler Inhale 2 puffs Every 4 (Four) Hours As Needed for Wheezing.     • ARIPiprazole (ABILIFY) 5 MG tablet Take 5 mg by mouth Daily.     • aspirin  MG tablet Take 325 mg by mouth Daily.     • atorvastatin (LIPITOR) 40 MG tablet Take 40 mg by mouth Daily.     • diltiaZEM CD (CARDIZEM CD) 180 MG 24 hr capsule Take 180 mg by mouth Daily.     • DULoxetine (CYMBALTA) 60 MG capsule Take 60 mg by mouth Every Other Day.     • Fluticasone-Umeclidin-Vilant (TRELEGY) 100-62.5-25 MCG/INH inhaler Inhale Daily.     • formoterol (PERFOROMIST) 20 MCG/2ML nebulizer solution Take  by nebulization  2 (Two) Times a Day.     • ipratropium-albuterol (DUO-NEB) 0.5-2.5 mg/3 ml nebulizer Take 3 mL by nebulization Every 4 (Four) Hours As Needed for Wheezing.     • isosorbide mononitrate (IMDUR) 30 MG 24 hr tablet TAKE 1 TABLET BY MOUTH EVERY DAY 90 tablet 0   • levothyroxine (SYNTHROID, LEVOTHROID) 175 MCG tablet Take 175 mcg by mouth Daily.     • LORazepam (ATIVAN) 1 MG tablet Take 1 mg by mouth Every Night.     • metFORMIN (GLUCOPHAGE) 500 MG tablet Take 500 mg by mouth 2 (Two) Times a Day With Meals.     • Mirabegron ER (MYRBETRIQ) 25 MG tablet sustained-release 24 hour 24 hr tablet Take 1 tablet by mouth Daily.     • multivitamin with minerals tablet tablet Take 1 tablet by mouth Daily.     • O2 (OXYGEN) Inhale 2 L/min As Needed.     • pramipexole (MIRAPEX) 0.25 MG tablet Take 0.25 mg by mouth Daily.     • predniSONE (DELTASONE) 20 MG tablet Take 1 tablet by mouth As Needed.     • roflumilast (DALIRESP) 250 MCG tablet tablet Take 1 tablet by mouth Daily. 28 tablet 0   • tamsulosin (FLOMAX) 0.4 MG capsule 24 hr capsule Take 1 capsule by mouth Daily.     • vitamin D (ERGOCALCIFEROL) 28077 UNITS capsule capsule Take 50,000 Units by mouth 1 (One) Time Per Week.     • propranolol (INDERAL) 20 MG tablet One tablet twice a day. An extra tablet can be taken if BP and heart rate not at goal. 90 tablet 6     No current facility-administered medications for this visit.       Patient has no known allergies.    Past Medical History:   Diagnosis Date   • Asthma    • Basal cell carcinoma    • Cancer (HCC)     Larnyx CA   • COPD (chronic obstructive pulmonary disease) (HCC)    • Coronary artery disease    • Diabetes mellitus (HCC)    • H/O hernia repair     Ruptured hernia repair   • History of cholecystectomy    • HTN (hypertension)    • Hypercholesterolemia    • Hypothyroidism    • IHD (ischemic heart disease)      s/p stenting   • Intracranial hemorrhage (HCC)    • Rheumatic fever     as child   • Sleep apnea     on CPAP.   •  "Spinal cord cysts     removed   • Thyroid cyst     removed       Social History     Socioeconomic History   • Marital status:    Tobacco Use   • Smoking status: Former   • Smokeless tobacco: Current     Types: Snuff   Vaping Use   • Vaping Use: Never used   Substance and Sexual Activity   • Alcohol use: No   • Drug use: No       Family History   Problem Relation Age of Onset   • Diabetes Mother        Review of Systems   Constitutional: Positive for decreased appetite, malaise/fatigue and weight loss (4 pounds).   HENT: Negative for congestion and nosebleeds.    Cardiovascular: Negative for chest pain, leg swelling, near-syncope and palpitations.   Respiratory: Positive for shortness of breath.    Endocrine: Negative for polydipsia, polyphagia and polyuria.   Hematologic/Lymphatic: Negative for bleeding problem. Does not bruise/bleed easily.   Skin:        Peeling of palms   Musculoskeletal: Negative for falls.   Gastrointestinal: Negative for abdominal pain, dysphagia, melena and nausea.   Genitourinary: Negative for dysuria and hematuria.   Neurological: Positive for excessive daytime sleepiness and tremors.   Psychiatric/Behavioral: Positive for depression. Negative for altered mental status and memory loss.   Allergic/Immunologic: Negative for persistent infections.        BP Readings from Last 5 Encounters:   10/19/22 128/72   04/11/22 116/70   08/17/21 122/64   01/27/21 112/68   07/23/20 108/70       Wt Readings from Last 5 Encounters:   10/19/22 117 kg (258 lb 6.4 oz)   04/11/22 119 kg (262 lb 6.4 oz)   08/17/21 128 kg (282 lb)   01/27/21 131 kg (289 lb 9.6 oz)   07/23/20 127 kg (280 lb)       Objective     /72 (BP Location: Right arm, Patient Position: Sitting)   Pulse 74   Ht 190.5 cm (75\")   Wt 117 kg (258 lb 6.4 oz)   BMI 32.30 kg/m²     Vitals and nursing note reviewed.   Constitutional:       Appearance: Not in distress. Frail.   Neck:      Vascular: No carotid bruit or JVD. "   Pulmonary:      Effort: Increased respiratory effort.   Cardiovascular:      Normal rate. Regular rhythm.      Murmurs: There is no murmur.   Pulses:     Intact distal pulses.   Abdominal:      General: Bowel sounds are normal.      Palpations: Abdomen is soft.   Skin:     General: Skin is warm and dry.      Coloration: Skin is not jaundiced or pale.   Neurological:      Mental Status: Oriented to person, place and time.          Procedures: none today          Assessment & Plan   Diagnoses and all orders for this visit:    1. Coronary artery disease involving native coronary artery of native heart without angina pectoris (Primary)    2. Essential hypertension  -     propranolol (INDERAL) 20 MG tablet; One tablet twice a day. An extra tablet can be taken if BP and heart rate not at goal.  Dispense: 90 tablet; Refill: 6    3. Tremor  -     propranolol (INDERAL) 20 MG tablet; One tablet twice a day. An extra tablet can be taken if BP and heart rate not at goal.  Dispense: 90 tablet; Refill: 6    4. Hypercholesteremia  -     Lipid Panel; Future    5. Type 2 diabetes mellitus without complication, without long-term current use of insulin ([facility])    6. Other sleep apnea    7. Medication management  -     Lipid Panel; Future      Patient presents today without cardiac symptoms or concerns.  No repeat cardiac testing ordered at this time.  Continue aspirin and Imdur.    His blood pressure, heart rate and rhythm are normal.  Due to development of tremor we will try changing beta-blocker from metoprolol to propanolol.  Prescription sent to his pharmacy.  Of note patient has appointment to see neurologist in December for further evaluation and management.    A lab order given to relook at lipid panel which she can have drawn at Dr. Linton office next week.  At this time continue current dose Lipitor.    Patient will follow with you for diabetic management.  Diabetic diet encouraged.    For sleep apnea he is using oxygen at  night with benefit.    A 6-month follow-up visit scheduled.  Please call sooner for cardiac concerns.

## 2022-12-21 ENCOUNTER — OFFICE VISIT (OUTPATIENT)
Dept: NEUROLOGY | Facility: CLINIC | Age: 77
End: 2022-12-21

## 2022-12-21 VITALS
HEART RATE: 111 BPM | TEMPERATURE: 97.3 F | WEIGHT: 258 LBS | DIASTOLIC BLOOD PRESSURE: 70 MMHG | SYSTOLIC BLOOD PRESSURE: 120 MMHG | BODY MASS INDEX: 32.08 KG/M2 | OXYGEN SATURATION: 95 % | HEIGHT: 75 IN

## 2022-12-21 DIAGNOSIS — G20 PARKINSONIAN TREMOR: Primary | ICD-10-CM

## 2022-12-21 DIAGNOSIS — D69.6 THROMBOCYTOPENIA: ICD-10-CM

## 2022-12-21 DIAGNOSIS — Z85.9 HISTORY OF CANCER: ICD-10-CM

## 2022-12-21 DIAGNOSIS — Z85.21 HISTORY OF PRIMARY LARYNGEAL CANCER: ICD-10-CM

## 2022-12-21 DIAGNOSIS — Z86.79 HISTORY OF SUBDURAL HEMATOMA: ICD-10-CM

## 2022-12-21 DIAGNOSIS — R27.0 ATAXIA: ICD-10-CM

## 2022-12-21 PROBLEM — C44.91 BASAL CELL CARCINOMA: Status: ACTIVE | Noted: 2022-12-21

## 2022-12-21 PROBLEM — J45.909 ASTHMA: Status: ACTIVE | Noted: 2022-12-21

## 2022-12-21 PROBLEM — M35.00 SJOGREN'S SYNDROME: Status: ACTIVE | Noted: 2022-12-21

## 2022-12-21 PROCEDURE — 99214 OFFICE O/P EST MOD 30 MIN: CPT | Performed by: NURSE PRACTITIONER

## 2022-12-21 RX ORDER — HYDROCODONE BITARTRATE AND ACETAMINOPHEN 7.5; 325 MG/1; MG/1
TABLET ORAL
COMMUNITY
Start: 2022-11-28

## 2022-12-21 NOTE — PROGRESS NOTES
Neuro Office Visit      Encounter Date: 2022   Patient Name: Robbie Carmen  : 1945   MRN: [id number]   PCP: Dr Mccormack  Chief Complaint:    Chief Complaint   Patient presents with   • Tremors       History of Present Illness: Robbie Carmen is a 77 y.o. male who is here today in Neurology with his wife and daughter for tremor.      Tremor  Onset 2022. Wife and daughter help provide history.  Fairly sudden onset of right hand tremor. Occurs mostly at rest. Intermittent throughout the day. At times he will shake while eating. Right hand will flap and right forearm with rotational tremor. He has to hold his arm with his left hand or hook his hand in his shirt at night. Tremor is less if he lays his hand on his chest.    Family reports masked face, micrographia, dysphagia, flexed posture, falling backward when lowering himself to toilet, bradykinesia w slow blinking    Denies vivid dreams, hallucinations, slurred speech, shuffling gait, trouble rolling over in bed.    No aggravating or alleviating factors. He does not drink etoh.  He does drink caffeine.  Meds include albuterol and propranolol. Propranolol did not improve his tremor  Brother with PD.    Notes he has lost 40 pounds without trying. Has a decreased appetite.    Seeing Dr Linton for thrombocytopenia. Bone marrow biopsy 2021. No diagnosis. Splenomegally on CT.  Seeing Dr Ashford-Rheumatology note reviewed: inflammatory polyarthopathy, sjogren's recently diagnosed-no treatment..  Saw ENT Dr Ash for lip biopsy  Dr Rojas-cardiology    Labs from PCP: glucose 159, sodium 145. tsh-nml, cbc w low platelets  A1c 5.2  JAMA crp, sed rate, c3, ANCA, RF-neg  Sjogren's anti-SS-B + 8.0  M-spike-neg  MRI Brain Without Contrast (2017 23:22)-IMPRESSION:  1.  Motion limited exam.  2.  Redemonstration of small left lateral convexity and left parafalcine   subdural fluid collections.  The signal intensity is compatible with subacute    subdural hematomas.  No acute bleed identified.    3.  Approximately 6 mm rightward midline shift, unchanged.        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 Brother w PD  SH: uses snuff, -etoh  Subjective      Past Medical History:   Past Medical History:   Diagnosis Date   • Asthma    • Basal cell carcinoma    • Cancer (HCC)     Larnyx CA   • COPD (chronic obstructive pulmonary disease) (HCC)    • Coronary artery disease    • Diabetes mellitus (HCC)    • H/O hernia repair     Ruptured hernia repair   • History of cholecystectomy    • History of primary laryngeal cancer 12/21/2022   • History of subdural hematoma 12/21/2022   • HTN (hypertension)    • Hypercholesterolemia    • Hypothyroidism    • IHD (ischemic heart disease)      s/p stenting   • Intracranial hemorrhage (HCC)    • Rheumatic fever     as child   • Sjogren's syndrome (HCC) 12/21/2022   • Sleep apnea     on CPAP.   • Spinal cord cysts     removed   • Thrombocytopenia (HCC) 12/21/2022   • Thyroid cyst     removed       Past Surgical History:   Past Surgical History:   Procedure Laterality Date   • CARDIAC CATHETERIZATION  08/17/1999    65% RCA.    • CARDIAC CATHETERIZATION  11/30/2004     75% RCA. 3.5 x 32 mm Taxus Stent.    • CARDIAC CATHETERIZATION  11/17/2005     75% in-Stent Stenosis. 3.5 x 28 mm Cypher Stent   • CARDIAC CATHETERIZATION  05/24/2011     Patent Stent in RCA. PA- 44 mmHg    • CARDIAC CATHETERIZATION  03/04/2019    Patent stent   • CARDIOVASCULAR STRESS TEST  06/04/2007     Stress- 4 Min 85% THR. Inferior Infarct .     • CARDIOVASCULAR STRESS TEST  07/30/2009    Stress- 4 Min, 36 sec. 97% THR. BP- 182/92. Small lateral Ischemia.    • CARDIOVASCULAR STRESS TEST  05/10/2011     Stress- 3 min 30 sec. 86% THR. 170/82. Negative.    • CARDIOVASCULAR STRESS TEST  2016    (Mod) 7 Min, 14 Secs. 91% THR. BP- 178/88. Negative   • CARDIOVASCULAR STRESS TEST  2018    L. Cardiolite- EF 53%. Reverse Redistribution seen in the Inferior Wall   • CARDIOVASCULAR STRESS TEST  2019    @ [facility]. Lexiscan. Inferior Infarct. EF 67%   • CARDIOVASCULAR STRESS TEST  2021    Lexiscan- EF 59%. Inferior Infarct.   • CHOLECYSTECTOMY     • ECHO - CONVERTED  2007    Echo- EF 65%.    • ECHO - CONVERTED  2009    Echo- EF >60%, Mild MR.    • ECHO - CONVERTED  2010    S. Echo- 5 Min, 49 Sec. 88% THR. BP_ 178/80. Negative.    • ECHO - CONVERTED  01/10/2018    EF 65%   • ECHO - CONVERTED  2019    [facility]- TLS- LVEF >55%, mild LVH, no WMA   • KNEE ARTHROPLASTY Left    • US CAROTID UNILATERAL  2012    Carotid US- Normal.    • US CAROTID UNILATERAL  2018    bilateral- no hemodynamically significant stenosis       Family History:   Family History   Problem Relation Age of Onset   • Diabetes Mother    • Alzheimer's disease Mother    • Hypertension Mother    • Hypothyroidism Mother        Social History:   Social History     Socioeconomic History   • Marital status:    Tobacco Use   • Smoking status: Former     Packs/day: 1.50     Years: 15.00     Pack years: 22.50     Types: Cigarettes     Start date: 1960     Quit date: 1992     Years since quittin.3   • Smokeless tobacco: Current     Types: Snuff   Vaping Use   • Vaping Use: Never used   Substance and Sexual Activity   • Alcohol use: No   • Drug use: No       Medications:     Current Outpatient Medications:   •  albuterol sulfate  (90 Base) MCG/ACT inhaler, Inhale 2 puffs Every 4 (Four) Hours As Needed for Wheezing., Disp: , Rfl:   •  ARIPiprazole (ABILIFY) 5 MG tablet, Take 5 mg by mouth Daily., Disp: , Rfl:   •  aspirin  MG tablet, Take 325 mg by mouth Daily., Disp: ,  Rfl:   •  atorvastatin (LIPITOR) 40 MG tablet, Take 40 mg by mouth Daily., Disp: , Rfl:   •  diltiaZEM CD (CARDIZEM CD) 180 MG 24 hr capsule, Take 180 mg by mouth Daily., Disp: , Rfl:   •  DULoxetine (CYMBALTA) 60 MG capsule, Take 60 mg by mouth Every Other Day., Disp: , Rfl:   •  Fluticasone-Umeclidin-Vilant (TRELEGY) 100-62.5-25 MCG/INH inhaler, Inhale Daily., Disp: , Rfl:   •  formoterol (PERFOROMIST) 20 MCG/2ML nebulizer solution, Take  by nebulization 2 (Two) Times a Day., Disp: , Rfl:   •  ipratropium-albuterol (DUO-NEB) 0.5-2.5 mg/3 ml nebulizer, Take 3 mL by nebulization Every 4 (Four) Hours As Needed for Wheezing., Disp: , Rfl:   •  isosorbide mononitrate (IMDUR) 30 MG 24 hr tablet, TAKE 1 TABLET BY MOUTH EVERY DAY, Disp: 90 tablet, Rfl: 0  •  levothyroxine (SYNTHROID, LEVOTHROID) 175 MCG tablet, Take 175 mcg by mouth Daily., Disp: , Rfl:   •  LORazepam (ATIVAN) 1 MG tablet, Take 1 mg by mouth Every Night., Disp: , Rfl:   •  metFORMIN (GLUCOPHAGE) 500 MG tablet, Take 500 mg by mouth 2 (Two) Times a Day With Meals., Disp: , Rfl:   •  multivitamin with minerals tablet tablet, Take 1 tablet by mouth Daily., Disp: , Rfl:   •  O2 (OXYGEN), Inhale 2 L/min As Needed., Disp: , Rfl:   •  pramipexole (MIRAPEX) 0.25 MG tablet, Take 0.25 mg by mouth Daily., Disp: , Rfl:   •  predniSONE (DELTASONE) 20 MG tablet, Take 1 tablet by mouth As Needed., Disp: , Rfl:   •  propranolol (INDERAL) 20 MG tablet, One tablet twice a day. An extra tablet can be taken if BP and heart rate not at goal., Disp: 90 tablet, Rfl: 6  •  roflumilast (DALIRESP) 250 MCG tablet tablet, Take 1 tablet by mouth Daily., Disp: 28 tablet, Rfl: 0  •  tamsulosin (FLOMAX) 0.4 MG capsule 24 hr capsule, Take 1 capsule by mouth Daily., Disp: , Rfl:   •  vitamin D (ERGOCALCIFEROL) 94731 UNITS capsule capsule, Take 50,000 Units by mouth 1 (One) Time Per Week., Disp: , Rfl:   •  carbidopa-levodopa (Sinemet)  MG per tablet, Take 1 tablet by mouth 3 (Three)  Times a Day., Disp: 90 tablet, Rfl: 2  •  HYDROcodone-acetaminophen (NORCO) 7.5-325 MG per tablet, , Disp: , Rfl:     Allergies:   No Known Allergies    PHQ-9 Total Score:     STEADI Fall Risk Assessment was completed, and patient is at HIGH risk for falls. Assessment completed on:12/21/2022    Objective     Physical Exam:   Physical Exam  Eyes:      Pupils: Pupils are equal, round, and reactive to light.   Neurological:      Coordination: Finger-Nose-Finger Test normal.      Gait: Tandem walk abnormal.      Deep Tendon Reflexes:      Reflex Scores:       Tricep reflexes are 1+ on the right side and 1+ on the left side.       Bicep reflexes are 1+ on the right side and 1+ on the left side.       Brachioradialis reflexes are 1+ on the right side and 1+ on the left side.       Patellar reflexes are 1+ on the right side and 1+ on the left side.       Achilles reflexes are 1+ on the right side and 1+ on the left side.        Neurologic Exam     Mental Status   Disoriented to person.   Disoriented to place.   Disoriented to time.   Attention: normal. Concentration: normal.   Level of consciousness: alert  Knowledge: consistent with education.   Normal comprehension.   Hoarse voice due to laryngeal cancer     Cranial Nerves     CN III, IV, VI   Pupils are equal, round, and reactive to light.  Right pupil: Accommodation: intact.   Left pupil: Accommodation: intact.   CN III: no CN III palsy  CN VI: no CN VI palsy  Nystagmus: none   Diplopia: none  Upgaze: normal  Downgaze: normal  Conjugate gaze: present    CN VII   Facial expression full, symmetric.     CN VIII   Hearing: intact    CN XII   CN XII normal.   Somewhat masked face with slow blinking     Motor Exam   Muscle bulk: normal  Overall muscle tone: normal  Right arm tone: increased  Left arm tone: normal  Right arm pronator drift: absent  Left arm pronator drift: absent  Right leg tone: normal  Left leg tone: normal    Strength   Right neck flexion: 4/5  Left neck  "flexion: 4/5  Right neck extension: 4/5  Left neck extension: 4/5  Right deltoid: 4/5  Left deltoid: 4/5  Right biceps: 4/5  Left biceps: 4/5  Right triceps: 4/5  Left triceps: 4/5  Right wrist flexion: 4/5  Left wrist flexion: 4/5  Right wrist extension: 4/5  Left wrist extension: 4/5  Right interossei: 4/5  Left interossei: 4/5  Right abdominals: 4/5  Left abdominals: 4/5  Right iliopsoas: 4/5  Left iliopsoas: 4/5  Right quadriceps: 4/5  Left quadriceps: 4/5  Right hamstrin/5  Left hamstrin/5  Right glutei: 4/5  Left glutei: 4/5  Right anterior tibial: 4/5  Left anterior tibial: 4/5  Right posterior tibial: 4/5  Left posterior tibial: 4/5  Right peroneal: 4/5  Left peroneal: 4/5  Right gastroc: 4/5  Left gastroc: 4/5    Sensory Exam   Light touch normal.     Gait, Coordination, and Reflexes     Coordination   Finger to nose coordination: normal  Tandem walking coordination: abnormal    Tremor   Resting tremor: present  Action tremor: absent    Reflexes   Right brachioradialis: 1+  Left brachioradialis: 1+  Right biceps: 1+  Left biceps: 1+  Right triceps: 1+  Left triceps: 1+  Right patellar: 1+  Left patellar: 1+  Right achilles: 1+  Left achilles: 1+  Right : 1+  Left : 1+Decreased right arm swing. nml gait. Slight kyphosis. Right hand w intermittent low velocity high amplitude flapping and rotational tremor of right forearm when distracted.        Vital Signs:   Vitals:    22 1410   BP: 120/70   Pulse: 111   Temp: 97.3 °F (36.3 °C)   SpO2: 95%   Weight: 117 kg (258 lb)   Height: 190.5 cm (75\")     Body mass index is 32.25 kg/m².         Assessment / Plan      Assessment/Plan:   Diagnoses and all orders for this visit:    1. Parkinsonian tremor (HCC) (Primary)  -     carbidopa-levodopa (Sinemet)  MG per tablet; Take 1 tablet by mouth 3 (Three) Times a Day.  Dispense: 90 tablet; Refill: 2  -     MRI Brain With & Without Contrast; Future    2. Ataxia  -     carbidopa-levodopa (Sinemet) "  MG per tablet; Take 1 tablet by mouth 3 (Three) Times a Day.  Dispense: 90 tablet; Refill: 2  -     MRI Brain With & Without Contrast; Future    3. History of cancer  -     MRI Brain With & Without Contrast; Future    4. History of subdural hematoma    5. History of primary laryngeal cancer    6. Thrombocytopenia (HCC)     Reports reviewed and scanned into record. Will start sinemet and obtain MRI of brain to rule out mets from laryngeal cancer.  Family history of PD and lambert-eaton disease.  FU via video after MRI.     Patient Education:       Reviewed medications, potential side effects and signs and symptoms to report. Discussed risk versus benefits of treatment plan with patient and/or family-including medications, labs and radiology that may be ordered. Addressed questions and concerns during visit. Patient and/or family verbalized understanding and agree with plan. Instructed to call the office with any questions and report to ER with any life-threatening symptoms.     Follow Up:   Return in about 2 months (around 2/21/2023), or video.    During this visit the following were done:  Labs Reviewed [x]    Labs Ordered []    Radiology Reports Reviewed [x]    Radiology Ordered [x]    PCP Records Reviewed [x]    Referring Provider Records Reviewed []    ER Records Reviewed [x]    Hospital Records Reviewed [x]    History Obtained From Family [x]    Radiology Images Reviewed [x]    Other Reviewed [x]    Records Requested [x]      Jonny Mcclain, NOAH, APRN

## 2023-01-09 DIAGNOSIS — I25.10 CORONARY ARTERY DISEASE INVOLVING NATIVE CORONARY ARTERY OF NATIVE HEART WITHOUT ANGINA PECTORIS: ICD-10-CM

## 2023-01-09 RX ORDER — ISOSORBIDE MONONITRATE 30 MG/1
TABLET, EXTENDED RELEASE ORAL
Qty: 90 TABLET | Refills: 0 | Status: SHIPPED | OUTPATIENT
Start: 2023-01-09

## 2023-02-06 ENCOUNTER — HOSPITAL ENCOUNTER (OUTPATIENT)
Dept: MRI IMAGING | Facility: HOSPITAL | Age: 78
Discharge: HOME | End: 2023-02-06
Admitting: NURSE PRACTITIONER
Payer: MEDICARE

## 2023-02-06 ENCOUNTER — TELEPHONE (OUTPATIENT)
Dept: NEUROLOGY | Facility: CLINIC | Age: 78
End: 2023-02-06
Payer: MEDICARE

## 2023-02-06 DIAGNOSIS — G20 PARKINSONIAN TREMOR: ICD-10-CM

## 2023-02-06 DIAGNOSIS — Z85.9 HISTORY OF CANCER: ICD-10-CM

## 2023-02-06 DIAGNOSIS — R27.0 ATAXIA: ICD-10-CM

## 2023-02-06 LAB — CREAT BLDA-MCNC: 1.1 MG/DL (ref 0.6–1.3)

## 2023-02-06 PROCEDURE — 0 GADOBENATE DIMEGLUMINE 529 MG/ML SOLUTION: Performed by: NURSE PRACTITIONER

## 2023-02-06 PROCEDURE — A9577 INJ MULTIHANCE: HCPCS | Performed by: NURSE PRACTITIONER

## 2023-02-06 PROCEDURE — 70553 MRI BRAIN STEM W/O & W/DYE: CPT

## 2023-02-06 PROCEDURE — 82565 ASSAY OF CREATININE: CPT

## 2023-02-06 RX ADMIN — GADOBENATE DIMEGLUMINE 20 ML: 529 INJECTION, SOLUTION INTRAVENOUS at 13:18

## 2023-02-06 NOTE — TELEPHONE ENCOUNTER
Called patient and left vm with results.      ----- Message from Jonny Mcclain DNP, APRN sent at 2/6/2023  2:18 PM EST -----  Please notify pt MRI of brain shows changes of aging. No concerning findings.

## 2023-02-06 NOTE — TELEPHONE ENCOUNTER
Caller: IRISH ABBASI  Relationship: PT'S DAUGHTER  Best call back number: [phone]    Who are you requesting to speak with (clinical staff, provider,  specific staff member):  KAYLA BOOTHE / ROSANA    What was the call regarding:   PT GOT THE MSG ABOUT THE MRI RESULTS BUT HE DIDN'T UNDERSTAND THE RESULTS. THE MSG WAS ACCIDENTALLY ERASED.    Do you require a callback:   PLEASE CALL IRISH TO DISCUSS MRI RESULTS.

## 2023-02-22 ENCOUNTER — TELEMEDICINE (OUTPATIENT)
Dept: NEUROLOGY | Facility: CLINIC | Age: 78
End: 2023-02-22
Payer: MEDICARE

## 2023-02-22 DIAGNOSIS — Z86.79 HISTORY OF SUBDURAL HEMATOMA: ICD-10-CM

## 2023-02-22 DIAGNOSIS — R27.0 ATAXIA: ICD-10-CM

## 2023-02-22 DIAGNOSIS — R29.6 FREQUENT FALLS: ICD-10-CM

## 2023-02-22 DIAGNOSIS — G20 PARKINSONIAN TREMOR: Primary | ICD-10-CM

## 2023-02-22 PROCEDURE — 99214 OFFICE O/P EST MOD 30 MIN: CPT | Performed by: NURSE PRACTITIONER

## 2023-02-22 NOTE — PROGRESS NOTES
Neuro Office Visit      Encounter Date: 2023   Patient Name: Robbie Carmen  : 1945   MRN: [id number]   PCP: Dr Mccormack  Chief Complaint:    Chief Complaint   Patient presents with   • Tremors       History of Present Illness: Robbie Carmen is a 77 y.o. male who is here today in Neurology for tremor and ataxia    You have chosen to receive care through a telehealth visit.  Do you consent to use a video/audio connection for your medical care today? Yes      Last visit 2022-start sinemet 10/100 1 tid, mri brain  MRI Brain With & Without Contrast (2023 13:18)-Impression:   Chronic and age-related changes are present as above. There is otherwise no evidence of acute ischemia, hemorrhage, mass or abnormal enhancement.      Tremor  Wife and daughter help with interview. Taking C/L 10/100 1 TID. No side effects. Still w tremor.  Has fallen 3 times. More confused. No hallucinations. Sleep quality is not consistent. Will sleep well for a few night and then is up during the night. Has COPD which has been worse recently. BP at home is stable.      PH  Onset 2022. Fairly sudden onset of right hand tremor. Occurs mostly at rest. Intermittent throughout the day. At times he will shake while eating. Right hand will flap and right forearm with rotational tremor. He has to hold his arm with his left hand or hook his hand in his shirt at night. Tremor is less if he lays his hand on his chest.     Family reports masked face, micrographia, dysphagia, flexed posture, falling backward when lowering himself to toilet, bradykinesia w slow blinking     Denies vivid dreams, hallucinations, slurred speech, shuffling gait, trouble rolling over in bed.     No aggravating or alleviating factors. He does not drink etoh.  He does drink caffeine.  Meds include albuterol and propranolol. Propranolol did not improve his tremor  Brother with PD.     Notes he has lost 40 pounds without trying. Has a decreased  appetite.     Seeing Dr Linton for thrombocytopenia. Bone marrow biopsy 12/8/2021. No diagnosis. Splenomegally on CT.  Seeing Dr Ashford-Rheumatology note reviewed: inflammatory polyarthopathy, sjogren's recently diagnosed-no treatment..  Saw ENT Dr Ash for lip biopsy  Dr Rojas-cardiology     Labs from PCP: glucose 159, sodium 145. tsh-nml, cbc w low platelets  A1c 5.2  JAMA crp, sed rate, c3, ANCA, RF-neg  Sjogren's anti-SS-B + 8.0  M-spike-neg  MRI Brain Without Contrast (08/21/2017 23:22)-IMPRESSION:  1.  Motion limited exam.  2.  Redemonstration of small left lateral convexity and left parafalcine   subdural fluid collections.  The signal intensity is compatible with subacute   subdural hematomas.  No acute bleed identified.    3.  Approximately 6 mm rightward midline shift, unchanged.      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 Brother w PD  SH: uses snuff, -etoh  Subjective      Past Medical History:   Past Medical History:   Diagnosis Date   • Asthma    • Basal cell carcinoma    • Cancer (HCC)     Larnyx CA   • COPD (chronic obstructive pulmonary disease) (HCC)    • Coronary artery disease    • Diabetes mellitus (HCC)    • H/O hernia repair     Ruptured hernia repair   • History of cholecystectomy    • History of primary laryngeal cancer 12/21/2022   • History of subdural hematoma 12/21/2022   • HTN (hypertension)    • Hypercholesterolemia    • Hypothyroidism    • IHD (ischemic heart disease)      s/p stenting   • Intracranial hemorrhage (HCC)    • Rheumatic fever     as child   • Sjogren's syndrome (HCC) 12/21/2022   • Sleep apnea     on CPAP.   • Spinal cord cysts     removed   • Thrombocytopenia (HCC) 12/21/2022   • Thyroid cyst     removed        Past Surgical History:   Past Surgical History:   Procedure Laterality Date   • CARDIAC CATHETERIZATION  08/17/1999    65% RCA.    • CARDIAC CATHETERIZATION  11/30/2004     75% RCA. 3.5 x 32 mm Taxus Stent.    • CARDIAC CATHETERIZATION  11/17/2005     75% in-Stent Stenosis. 3.5 x 28 mm Cypher Stent   • CARDIAC CATHETERIZATION  05/24/2011     Patent Stent in RCA. PA- 44 mmHg    • CARDIAC CATHETERIZATION  03/04/2019    Patent stent   • CARDIOVASCULAR STRESS TEST  06/04/2007     Stress- 4 Min 85% THR. Inferior Infarct .     • CARDIOVASCULAR STRESS TEST  07/30/2009    Stress- 4 Min, 36 sec. 97% THR. BP- 182/92. Small lateral Ischemia.    • CARDIOVASCULAR STRESS TEST  05/10/2011    Stress- 3 min 30 sec. 86% THR. 170/82. Negative.    • CARDIOVASCULAR STRESS TEST  05/24/2016    (Mod) 7 Min, 14 Secs. 91% THR. BP- 178/88. Negative   • CARDIOVASCULAR STRESS TEST  11/14/2018    L. Cardiolite- EF 53%. Reverse Redistribution seen in the Inferior Wall   • CARDIOVASCULAR STRESS TEST  07/24/2019    @ [facility]. Lexiscan. Inferior Infarct. EF 67%   • CARDIOVASCULAR STRESS TEST  01/19/2021    Lexiscan- EF 59%. Inferior Infarct.   • CHOLECYSTECTOMY     • ECHO - CONVERTED  06/04/2007    Echo- EF 65%.    • ECHO - CONVERTED  07/30/2009    Echo- EF >60%, Mild MR.    • ECHO - CONVERTED  04/27/2010    S. Echo- 5 Min, 49 Sec. 88% THR. BP_ 178/80. Negative.    • ECHO - CONVERTED  01/10/2018    EF 65%   • ECHO - CONVERTED  07/24/2019    [facility]- TLS- LVEF >55%, mild LVH, no WMA   • KNEE ARTHROPLASTY Left    • US CAROTID UNILATERAL  05/11/2012    Carotid US- Normal.    • US CAROTID UNILATERAL  11/16/2018    bilateral- no hemodynamically significant stenosis       Family History:   Family History   Problem Relation Age of Onset   • Diabetes Mother    • Alzheimer's disease Mother    • Hypertension Mother    • Hypothyroidism Mother        Social History:   Social History     Socioeconomic History   • Marital status:     Tobacco Use   • Smoking status: Former     Packs/day: 1.50     Years: 15.00     Pack years: 22.50     Types: Cigarettes     Start date: 1960     Quit date: 1992     Years since quittin.4   • Smokeless tobacco: Current     Types: Snuff   Vaping Use   • Vaping Use: Never used   Substance and Sexual Activity   • Alcohol use: No   • Drug use: No       Medications:     Current Outpatient Medications:   •  albuterol sulfate  (90 Base) MCG/ACT inhaler, Inhale 2 puffs Every 4 (Four) Hours As Needed for Wheezing., Disp: , Rfl:   •  ARIPiprazole (ABILIFY) 5 MG tablet, Take 5 mg by mouth Daily., Disp: , Rfl:   •  aspirin  MG tablet, Take 325 mg by mouth Daily., Disp: , Rfl:   •  atorvastatin (LIPITOR) 40 MG tablet, Take 40 mg by mouth Daily., Disp: , Rfl:   •  diltiaZEM CD (CARDIZEM CD) 180 MG 24 hr capsule, Take 180 mg by mouth Daily., Disp: , Rfl:   •  DULoxetine (CYMBALTA) 60 MG capsule, Take 60 mg by mouth Every Other Day., Disp: , Rfl:   •  Fluticasone-Umeclidin-Vilant (TRELEGY) 100-62.5-25 MCG/INH inhaler, Inhale Daily., Disp: , Rfl:   •  formoterol (PERFOROMIST) 20 MCG/2ML nebulizer solution, Take  by nebulization 2 (Two) Times a Day., Disp: , Rfl:   •  ipratropium-albuterol (DUO-NEB) 0.5-2.5 mg/3 ml nebulizer, Take 3 mL by nebulization Every 4 (Four) Hours As Needed for Wheezing., Disp: , Rfl:   •  isosorbide mononitrate (IMDUR) 30 MG 24 hr tablet, TAKE 1 TABLET BY MOUTH EVERY DAY, Disp: 90 tablet, Rfl: 0  •  levothyroxine (SYNTHROID, LEVOTHROID) 175 MCG tablet, Take 175 mcg by mouth Daily., Disp: , Rfl:   •  LORazepam (ATIVAN) 1 MG tablet, Take 1 mg by mouth Every Night., Disp: , Rfl:   •  metFORMIN (GLUCOPHAGE) 500 MG tablet, Take 500 mg by mouth 2 (Two) Times a Day With Meals., Disp: , Rfl:   •  multivitamin with minerals tablet tablet, Take 1 tablet by mouth Daily., Disp: , Rfl:   •  O2 (OXYGEN), Inhale 2 L/min As Needed., Disp: , Rfl:   •  predniSONE (DELTASONE) 20 MG tablet, Take 1  tablet by mouth As Needed., Disp: , Rfl:   •  propranolol (INDERAL) 20 MG tablet, One tablet twice a day. An extra tablet can be taken if BP and heart rate not at goal., Disp: 90 tablet, Rfl: 6  •  roflumilast (DALIRESP) 250 MCG tablet tablet, Take 1 tablet by mouth Daily., Disp: 28 tablet, Rfl: 0  •  tamsulosin (FLOMAX) 0.4 MG capsule 24 hr capsule, Take 1 capsule by mouth Daily., Disp: , Rfl:   •  vitamin D (ERGOCALCIFEROL) 79865 UNITS capsule capsule, Take 50,000 Units by mouth 1 (One) Time Per Week., Disp: , Rfl:   •  carbidopa-levodopa (SINEMET)  MG per tablet, Take 1 tablet by mouth 3 (Three) Times a Day., Disp: 90 tablet, Rfl: 2  •  HYDROcodone-acetaminophen (NORCO) 7.5-325 MG per tablet, , Disp: , Rfl:   •  pramipexole (MIRAPEX) 0.25 MG tablet, Take 0.25 mg by mouth Daily., Disp: , Rfl:     Allergies:   No Known Allergies    PHQ-9 Total Score:     STEADI Fall Risk Assessment has not been completed.    Objective     Physical Exam:   Physical Exam  Neurological:      Mental Status: He is oriented to person, place, and time.      Coordination: Finger-Nose-Finger Test normal.   Psychiatric:         Speech: Speech normal.         Neurologic Exam     Mental Status   Oriented to person, place, and time.   Attention: normal. Concentration: normal.   Speech: speech is normal   Level of consciousness: alert  Knowledge: consistent with education.     Motor Exam   Muscle bulk: normal    Gait, Coordination, and Reflexes     Gait  Gait: shuffling    Coordination   Finger to nose coordination: normal    Tremor   Resting tremor: absentPt walked into room without assist. Kyphotic posture. Mild bradykinesia. No obvious tremor on video today. No dysmetria with FNF test.        Vital Signs: There were no vitals filed for this visit.  There is no height or weight on file to calculate BMI.     Results:   Imaging:   MRI Brain With & Without Contrast    Result Date: 2/6/2023  Impression: Chronic and age-related changes are  present as above. There is otherwise no evidence of acute ischemia, hemorrhage, mass or abnormal enhancement. Electronically Signed: Yan Bone  2/6/2023 2:08 PM EST  Workstation ID: [id number]         Assessment / Plan      Assessment/Plan:   Diagnoses and all orders for this visit:    1. Parkinsonian tremor (HCC) (Primary)  -     carbidopa-levodopa (SINEMET)  MG per tablet; Take 1 tablet by mouth 3 (Three) Times a Day.  Dispense: 90 tablet; Refill: 2    2. Ataxia    3. History of subdural hematoma    4. Frequent falls  Comments:  Pt to use walker at all times to prevent falls.     Next visit in person to assess tremor and gait.      Patient Education:       Reviewed medications, potential side effects and signs and symptoms to report. Discussed risk versus benefits of treatment plan with patient and/or family-including medications, labs and radiology that may be ordered. Addressed questions and concerns during visit. Patient and/or family verbalized understanding and agree with plan. Instructed to call the office with any questions and report to ER with any life-threatening symptoms.     Follow Up:   Return in about 3 months (around 5/22/2023) for Recheck.    During this visit the following were done:  Labs Reviewed []    Labs Ordered []    Radiology Reports Reviewed [x]    Radiology Ordered []    PCP Records Reviewed []    Referring Provider Records Reviewed []    ER Records Reviewed []    Hospital Records Reviewed []    History Obtained From Family [x]    Radiology Images Reviewed []    Other Reviewed [x]    Records Requested []      Jonny Mcclain, NOAH, APRN

## 2023-02-28 DIAGNOSIS — I10 ESSENTIAL HYPERTENSION: ICD-10-CM

## 2023-02-28 DIAGNOSIS — R25.1 TREMOR: ICD-10-CM

## 2023-03-01 RX ORDER — PROPRANOLOL HYDROCHLORIDE 20 MG/1
TABLET ORAL
Qty: 225 TABLET | Refills: 2 | Status: SHIPPED | OUTPATIENT
Start: 2023-03-01

## 2023-04-20 ENCOUNTER — OFFICE VISIT (OUTPATIENT)
Dept: CARDIOLOGY | Facility: CLINIC | Age: 78
End: 2023-04-20
Payer: MEDICARE

## 2023-04-20 VITALS
BODY MASS INDEX: 32.58 KG/M2 | SYSTOLIC BLOOD PRESSURE: 120 MMHG | DIASTOLIC BLOOD PRESSURE: 70 MMHG | HEART RATE: 78 BPM | HEIGHT: 75 IN | WEIGHT: 262 LBS

## 2023-04-20 DIAGNOSIS — E78.00 HYPERCHOLESTEREMIA: ICD-10-CM

## 2023-04-20 DIAGNOSIS — E88.81 METABOLIC SYNDROME: ICD-10-CM

## 2023-04-20 DIAGNOSIS — J41.0 SIMPLE CHRONIC BRONCHITIS: ICD-10-CM

## 2023-04-20 DIAGNOSIS — E03.9 ACQUIRED HYPOTHYROIDISM: ICD-10-CM

## 2023-04-20 DIAGNOSIS — E11.9 TYPE 2 DIABETES MELLITUS WITHOUT COMPLICATION, WITHOUT LONG-TERM CURRENT USE OF INSULIN: ICD-10-CM

## 2023-04-20 DIAGNOSIS — I10 ESSENTIAL HYPERTENSION: ICD-10-CM

## 2023-04-20 DIAGNOSIS — I25.9 IHD (ISCHEMIC HEART DISEASE): Primary | ICD-10-CM

## 2023-04-20 DIAGNOSIS — G47.39 OTHER SLEEP APNEA: ICD-10-CM

## 2023-04-20 NOTE — PROGRESS NOTES
Chief Complaint   Patient presents with   • Follow-up     Cardiac management. Has increase in generalized weakness ,especially in legs , having 2-3 fall in the last month. Has new diagnosis of Parkinson disease since last office visit   • LABS     Had labs per , he has shown improvement in platelets .   • Shortness of Breath     Has worsening SOA due to COPD. Had 2 ER visit at [facility] and had follow up this week with PCP   • Med Refill     No refills needed today.        Subjective       Robbie Carmen is a 77 y.o. male with HTN, hyperlipidemia, palpitations, COPD/sleep apnea (wearing CPAP), and ischemic heart disease in 1999. He underwent stenting of RCA in 2004.  In 2017, he suffered brain bleed, transferred from [facility] to [facility]. IN 2018 he had repeat echocardiogram showed normal LVEF, no AS and mild MR. 2019 cardiac cath showed patent RCA stent.  On 1/19/2021 repeat stress test showed inferior wall infarct without ischemia which was similar to prior.  Cardiac monitor revealed 7 short runs of SVT beta-blocker continued.  He later reported improvement in symptoms since using BiPAP. He follows routinely with Dr. Linton regarding abnormal blood count and neurologist regarding tremors.     At last visit Metoprolol changed to Propanolol to aid management of tremors.     Today he returns to the office for follow-up visit.  He has been diagnosed with Parkinson's since last office visit.  Sinemet started.  From a cardiac standpoint no recent symptoms or concerns voiced.  He admits he is using supplemental oxygen but no longer using BiPAP mask due to causing some dental issues.  According to patient's wife he has follow-up visit with pulmonologist in June.  Currently he is undergoing physical therapy for strength and conditioning.     Cardiac History:    Past Surgical History:   Procedure Laterality Date   • CARDIAC CATHETERIZATION  08/17/1999    65% RCA.    • CARDIAC CATHETERIZATION  11/30/2004     75% RCA.  3.5 x 32 mm Taxus Stent.    • CARDIAC CATHETERIZATION  11/17/2005     75% in-Stent Stenosis. 3.5 x 28 mm Cypher Stent   • CARDIAC CATHETERIZATION  05/24/2011     Patent Stent in RCA. PA- 44 mmHg    • CARDIAC CATHETERIZATION  03/04/2019    Patent stent   • CARDIOVASCULAR STRESS TEST  06/04/2007     Stress- 4 Min 85% THR. Inferior Infarct .     • CARDIOVASCULAR STRESS TEST  07/30/2009    Stress- 4 Min, 36 sec. 97% THR. BP- 182/92. Small lateral Ischemia.    • CARDIOVASCULAR STRESS TEST  05/10/2011    Stress- 3 min 30 sec. 86% THR. 170/82. Negative.    • CARDIOVASCULAR STRESS TEST  05/24/2016    (Mod) 7 Min, 14 Secs. 91% THR. BP- 178/88. Negative   • CARDIOVASCULAR STRESS TEST  11/14/2018    L. Cardiolite- EF 53%. Reverse Redistribution seen in the Inferior Wall   • CARDIOVASCULAR STRESS TEST  07/24/2019    @ [facility]. Lexiscan. Inferior Infarct. EF 67%   • CARDIOVASCULAR STRESS TEST  01/19/2021    Lexiscan- EF 59%. Inferior Infarct.   • CHOLECYSTECTOMY     • ECHO - CONVERTED  06/04/2007    Echo- EF 65%.    • ECHO - CONVERTED  07/30/2009    Echo- EF >60%, Mild MR.    • ECHO - CONVERTED  04/27/2010    S. Echo- 5 Min, 49 Sec. 88% THR. BP_ 178/80. Negative.    • ECHO - CONVERTED  01/10/2018    EF 65%   • ECHO - CONVERTED  07/24/2019    [facility]- TLS- LVEF >55%, mild LVH, no WMA   • KNEE ARTHROPLASTY Left    • US CAROTID UNILATERAL  05/11/2012    Carotid US- Normal.    • US CAROTID UNILATERAL  11/16/2018    bilateral- no hemodynamically significant stenosis       Current Outpatient Medications   Medication Sig Dispense Refill   • albuterol sulfate  (90 Base) MCG/ACT inhaler Inhale 2 puffs Every 4 (Four) Hours As Needed for Wheezing.     • ARIPiprazole (ABILIFY) 5 MG tablet Take 1 tablet by mouth Daily.     • aspirin  MG tablet Take 1 tablet by mouth Daily.     • atorvastatin (LIPITOR) 40 MG tablet Take 1 tablet by mouth Daily.     • carbidopa-levodopa (SINEMET)  MG per tablet Take 1 tablet  by mouth 3 (Three) Times a Day. 90 tablet 2   • diltiaZEM CD (CARDIZEM CD) 180 MG 24 hr capsule Take 1 capsule by mouth Daily.     • DULoxetine (CYMBALTA) 60 MG capsule Take 1 capsule by mouth Every Other Day.     • Fluticasone-Umeclidin-Vilant (TRELEGY) 100-62.5-25 MCG/INH inhaler Inhale Daily.     • formoterol (PERFOROMIST) 20 MCG/2ML nebulizer solution Take  by nebulization 2 (Two) Times a Day.     • ipratropium-albuterol (DUO-NEB) 0.5-2.5 mg/3 ml nebulizer Take 3 mL by nebulization Every 4 (Four) Hours As Needed for Wheezing.     • isosorbide mononitrate (IMDUR) 30 MG 24 hr tablet TAKE 1 TABLET BY MOUTH EVERY DAY 90 tablet 0   • levothyroxine (SYNTHROID, LEVOTHROID) 175 MCG tablet Take 1 tablet by mouth Daily.     • LORazepam (ATIVAN) 1 MG tablet Take 1 tablet by mouth Every Night.     • metFORMIN (GLUCOPHAGE) 500 MG tablet Take 1 tablet by mouth 2 (Two) Times a Day With Meals.     • multivitamin with minerals tablet tablet Take 1 tablet by mouth Daily.     • O2 (OXYGEN) Inhale 3 L/min As Needed.     • pramipexole (MIRAPEX) 0.25 MG tablet Take 1 tablet by mouth Daily.     • predniSONE (DELTASONE) 20 MG tablet Take 1 tablet by mouth As Needed.     • propranolol (INDERAL) 20 MG tablet One tablet twice a day. An extra tablet can be taken if BP and heart rate not at goal. 225 tablet 2   • roflumilast (DALIRESP) 250 MCG tablet tablet Take 1 tablet by mouth Daily. (Patient taking differently: Take 2 tablets by mouth Daily.) 28 tablet 0   • tamsulosin (FLOMAX) 0.4 MG capsule 24 hr capsule Take 1 capsule by mouth Daily.     • vitamin D (ERGOCALCIFEROL) 10181 UNITS capsule capsule Take 1 capsule by mouth 1 (One) Time Per Week.       No current facility-administered medications for this visit.       Patient has no known allergies.    Past Medical History:   Diagnosis Date   • Asthma    • Basal cell carcinoma    • Cancer     Larnyx CA   • COPD (chronic obstructive pulmonary disease)    • Coronary artery disease    •  Diabetes mellitus    • H/O hernia repair     Ruptured hernia repair   • History of cholecystectomy    • History of primary laryngeal cancer 2022   • History of subdural hematoma 2022   • HTN (hypertension)    • Hypercholesterolemia    • Hypothyroidism    • IHD (ischemic heart disease)      s/p stenting   • Intracranial hemorrhage    • Parkinson disease    • Rheumatic fever     as child   • Sjogren's syndrome 2022   • Sleep apnea     on CPAP.   • Spinal cord cysts     removed   • Thrombocytopenia 2022   • Thyroid cyst     removed       Social History     Socioeconomic History   • Marital status:    Tobacco Use   • Smoking status: Former     Packs/day: 1.50     Years: 15.00     Pack years: 22.50     Types: Cigarettes     Start date: 1960     Quit date: 1992     Years since quittin.6   • Smokeless tobacco: Current     Types: Snuff   Vaping Use   • Vaping Use: Never used   Substance and Sexual Activity   • Alcohol use: No   • Drug use: No       Family History   Problem Relation Age of Onset   • Diabetes Mother    • Alzheimer's disease Mother    • Hypertension Mother    • Hypothyroidism Mother        Review of Systems   Constitutional: Positive for malaise/fatigue. Negative for decreased appetite and diaphoresis.   HENT: Negative for nosebleeds.    Eyes: Negative for blurred vision.   Cardiovascular: Negative for chest pain, claudication, cyanosis, dyspnea on exertion, irregular heartbeat, leg swelling, near-syncope, orthopnea, palpitations, paroxysmal nocturnal dyspnea and syncope.   Respiratory: Positive for cough, shortness of breath and sleep disturbances due to breathing. Negative for sputum production.    Endocrine: Negative for cold intolerance and heat intolerance.   Hematologic/Lymphatic: Negative for bleeding problem. Does not bruise/bleed easily.   Skin: Negative for rash.   Musculoskeletal: Positive for muscle weakness. Negative for myalgias.   Gastrointestinal:  "Negative for heartburn, melena and nausea.   Genitourinary: Negative for dysuria and hematuria.   Neurological: Positive for disturbances in coordination, excessive daytime sleepiness, loss of balance ( using cane) and weakness. Negative for dizziness and light-headedness.   Psychiatric/Behavioral: The patient has insomnia. The patient is not nervous/anxious.         BP Readings from Last 5 Encounters:   04/20/23 120/70   12/21/22 120/70   10/19/22 128/72   04/11/22 116/70   08/17/21 122/64       Wt Readings from Last 5 Encounters:   04/20/23 119 kg (262 lb)   12/21/22 117 kg (258 lb)   10/19/22 117 kg (258 lb 6.4 oz)   04/11/22 119 kg (262 lb 6.4 oz)   08/17/21 128 kg (282 lb)       Objective     /70 (BP Location: Left arm, Patient Position: Sitting)   Pulse 78   Ht 190.5 cm (75\")   Wt 119 kg (262 lb)   BMI 32.75 kg/m²     Vitals and nursing note reviewed.   Constitutional:       Appearance: Not in distress. Frail. Chronically ill-appearing.   Eyes:      Conjunctiva/sclera: Conjunctivae normal.      Pupils: Pupils are equal, round, and reactive to light.   HENT:      Head: Normocephalic.   Pulmonary:      Effort: Pulmonary effort is normal.      Breath sounds: Normal breath sounds.   Cardiovascular:      PMI at left midclavicular line. Normal rate. Regular rhythm.      Murmurs: There is a systolic murmur.   Pulses:     Intact distal pulses.   Edema:     Peripheral edema absent.   Abdominal:      General: Bowel sounds are normal.      Palpations: Abdomen is soft.   Musculoskeletal: Normal range of motion.      Cervical back: Normal range of motion and neck supple. Skin:     General: Skin is warm and dry.   Neurological:      Mental Status: Alert, oriented to person, place, and time and oriented to person, place and time.   Psychiatric:         Mood and Affect: Affect is blunt.         Speech: Speech is delayed.         Behavior: Behavior is cooperative.         Cognition and Memory: Memory normal.      "     Procedures         Assessment & Plan   Diagnoses and all orders for this visit:    1. IHD (ischemic heart disease) (Primary)    2. Essential hypertension    3. Hypercholesteremia    4. Metabolic syndrome    5. Acquired hypothyroidism    6. Type 2 diabetes mellitus without complication, without long-term current use of insulin    7. Simple chronic bronchitis    8. Other sleep apnea      IHD  -2021 Lexiscan stress test negative for ischemia  -Anginal symptoms denied  -Continue aspirin, statin, Imdur    Hypertension  -BP control  - Currently heart rate and rhythm normal.  Patient admits heart rate increases with exertion, attributes to poor conditioning.  Currently in physical therapy.  Continue to monitor vital signs.  -Continue beta-blocker in form of Inderal, Cardizem  mg daily    Hypercholesterolemia  -he will follow with your office for lab orders/management  -Continue statin    Metabolic syndrome/hypothyroidism/diabetes  - Remains on Glucophage and levothyroxine, managed by your office  -Weight is up 4 pounds.  Good diabetic diet encouraged    Chronic bronchitis-EDDIE  -Follows with pulmonologist with next appointment in June of this year  -Patient agrees to retry BiPAP therapy and if problems develop will discuss with pulmonologist.  Currently he is using supplemental oxygen with some benefit    From a cardiac standpoint we will continue with current plan of care.  Should issues with increased heart rate or other symptoms develop patient and his wife agree to contact the office.    A 6-month follow-up visit scheduled.

## 2023-05-22 DIAGNOSIS — I25.10 CORONARY ARTERY DISEASE INVOLVING NATIVE CORONARY ARTERY OF NATIVE HEART WITHOUT ANGINA PECTORIS: ICD-10-CM

## 2023-05-22 RX ORDER — ISOSORBIDE MONONITRATE 30 MG/1
TABLET, EXTENDED RELEASE ORAL
Qty: 90 TABLET | Refills: 0 | Status: SHIPPED | OUTPATIENT
Start: 2023-05-22

## 2023-06-05 ENCOUNTER — TELEPHONE (OUTPATIENT)
Dept: CARDIOLOGY | Facility: CLINIC | Age: 78
End: 2023-06-05

## 2023-06-05 ENCOUNTER — CLINICAL SUPPORT (OUTPATIENT)
Dept: CARDIOLOGY | Facility: CLINIC | Age: 78
End: 2023-06-05
Payer: MEDICARE

## 2023-06-05 VITALS — DIASTOLIC BLOOD PRESSURE: 84 MMHG | HEART RATE: 102 BPM | SYSTOLIC BLOOD PRESSURE: 128 MMHG

## 2023-06-05 DIAGNOSIS — Z01.30 BLOOD PRESSURE CHECK: Primary | ICD-10-CM

## 2023-06-05 RX ORDER — BUDESONIDE 0.5 MG/2ML
INHALANT ORAL
COMMUNITY
Start: 2023-05-15

## 2023-06-05 RX ORDER — ROFLUMILAST 500 UG/1
500 TABLET ORAL
COMMUNITY
Start: 2023-05-22

## 2023-06-05 NOTE — TELEPHONE ENCOUNTER
Patient in office for BP check. BP was 128/84 and HR: 102. HR did have a couple skips. Pt not having any symptoms, states came here due to high HR and BP at rehab.

## 2023-06-07 ENCOUNTER — OFFICE VISIT (OUTPATIENT)
Dept: NEUROLOGY | Facility: CLINIC | Age: 78
End: 2023-06-07
Payer: MEDICARE

## 2023-06-07 ENCOUNTER — LAB (OUTPATIENT)
Dept: LAB | Facility: HOSPITAL | Age: 78
End: 2023-06-07
Payer: MEDICARE

## 2023-06-07 VITALS
HEART RATE: 88 BPM | SYSTOLIC BLOOD PRESSURE: 92 MMHG | OXYGEN SATURATION: 92 % | WEIGHT: 247.4 LBS | HEIGHT: 75 IN | BODY MASS INDEX: 30.76 KG/M2 | DIASTOLIC BLOOD PRESSURE: 66 MMHG

## 2023-06-07 DIAGNOSIS — R93.89 ABNORMAL FINDINGS ON DIAGNOSTIC IMAGING OF OTHER SPECIFIED BODY STRUCTURES: ICD-10-CM

## 2023-06-07 DIAGNOSIS — G20 PARKINSONIAN TREMOR: ICD-10-CM

## 2023-06-07 DIAGNOSIS — Z91.81 AT HIGH RISK FOR FALLS: ICD-10-CM

## 2023-06-07 DIAGNOSIS — G20 PARKINSONIAN TREMOR: Primary | ICD-10-CM

## 2023-06-07 PROCEDURE — 3078F DIAST BP <80 MM HG: CPT | Performed by: NURSE PRACTITIONER

## 2023-06-07 PROCEDURE — 99214 OFFICE O/P EST MOD 30 MIN: CPT | Performed by: NURSE PRACTITIONER

## 2023-06-07 PROCEDURE — 1160F RVW MEDS BY RX/DR IN RCRD: CPT | Performed by: NURSE PRACTITIONER

## 2023-06-07 PROCEDURE — 1159F MED LIST DOCD IN RCRD: CPT | Performed by: NURSE PRACTITIONER

## 2023-06-07 PROCEDURE — 3074F SYST BP LT 130 MM HG: CPT | Performed by: NURSE PRACTITIONER

## 2023-06-07 NOTE — LETTER
2023     Justin Mccormack MD  [address] [place] [address]    Patient: Robbie Carmen   YOB: 1945   Date of Visit: 2023       Dear Dr. Easton MD:    Thank you for referring Robbie Carmen to me for evaluation. Below are the relevant portions of my assessment and plan of care.    If you have questions, please do not hesitate to call me. I look forward to following Robbie along with you.         Sincerely,        Jonny Mcclain DNP, APRN        CC: No Recipients    Jonny Mcclain DNP, APRN  23 1421  Addendum     Neuro Office Visit      Encounter Date: 2023   Patient Name: Robbie Carmen  : 1945   MRN: [id number]     Chief Complaint:    Chief Complaint   Patient presents with   • Tremors       History of Present Illness: Robbie Carmen is a 77 y.o. male who is here today in Neurology for  parkinsonian tremor.        Subjective    Last visit 23 telehealth visit with me-Sinemet 25/100 1 tid. Use walker to prevent falls.      Parkinsonian Tremor  Taking C/L 25/100 1 TID No side effects. Wife has noted an improvement in his tremor.  Less falls. Using rolling walker. Has increased weakness and fatigue. Denies ptosis and fatigue with chewing. Wife has noted increased confusion and active dreaming. Denies hallucinations. Good appetite but continues to lose weight. Wife notes increased masked face and slowing of movements.  MMSE 25/30    PH  Onset 2022. Fairly sudden onset of right hand tremor. Occurs mostly at rest. Intermittent throughout the day. At times he will shake while eating. Right hand will flap and right forearm with rotational tremor. He has to hold his arm with his left hand or hook his hand in his shirt at night. Tremor is less if he lays his hand on his chest.     Family reports masked face, micrographia, dysphagia, flexed posture, falling backward when lowering himself to toilet, bradykinesia w slow blinking     Denies vivid dreams,  hallucinations, slurred speech, shuffling gait, trouble rolling over in bed.     No aggravating or alleviating factors. He does not drink etoh.  He does drink caffeine.  Meds include albuterol and propranolol. Propranolol did not improve his tremor  Uncle with PD.     Notes he has lost 40 pounds without trying. Has a decreased appetite.     Seeing Dr Linton for thrombocytopenia. Bone marrow biopsy 12/8/2021. No diagnosis. Splenomegally on CT.  Seeing Dr Ashford-Rheumatology note reviewed: inflammatory polyarthopathy, sjogren's recently diagnosed-no treatment..  Saw ENT Dr Ash for lip biopsy  Dr Rojas-cardiology     Labs from PCP: glucose 159, sodium 145. tsh-nml, cbc w low platelets  A1c 5.2  JAMA crp, sed rate, c3, ANCA, RF-neg  Sjogren's anti-SS-B + 8.0  M-spike-neg  MRI Brain Without Contrast (08/21/2017 23:22)-IMPRESSION:  1.  Motion limited exam.  2.  Redemonstration of small left lateral convexity and left parafalcine   subdural fluid collections.  The signal intensity is compatible with subacute   subdural hematomas.  No acute bleed identified.    3.  Approximately 6 mm rightward midline shift, unchanged.    MRI Brain With & Without Contrast (02/06/2023 13:18)-Impression:   Chronic and age-related changes are present as above. There is otherwise no evidence of acute ischemia, hemorrhage, mass or abnormal enhancement.         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 Brother w PD  SH: uses snuff, -etoh  Subjective             Past Medical History:   Past Medical History:   Diagnosis Date   • Asthma    • Basal cell carcinoma    • Cancer     Larnyx CA   • COPD (chronic obstructive pulmonary disease)    • Coronary artery disease    • Diabetes mellitus     • H/O hernia repair     Ruptured hernia repair   • History of cholecystectomy    • History of primary laryngeal cancer 12/21/2022   • History of subdural hematoma 12/21/2022   • HTN (hypertension)    • Hypercholesterolemia    • Hypothyroidism    • IHD (ischemic heart disease)      s/p stenting   • Intracranial hemorrhage    • Movement disorder    • Parkinson disease    • Rheumatic fever     as child   • Sjogren's syndrome 12/21/2022   • Sleep apnea     on CPAP.   • Spinal cord cysts     removed   • Thrombocytopenia 12/21/2022   • Thyroid cyst     removed       Past Surgical History:   Past Surgical History:   Procedure Laterality Date   • CARDIAC CATHETERIZATION  08/17/1999    65% RCA.    • CARDIAC CATHETERIZATION  11/30/2004     75% RCA. 3.5 x 32 mm Taxus Stent.    • CARDIAC CATHETERIZATION  11/17/2005     75% in-Stent Stenosis. 3.5 x 28 mm Cypher Stent   • CARDIAC CATHETERIZATION  05/24/2011     Patent Stent in RCA. PA- 44 mmHg    • CARDIAC CATHETERIZATION  03/04/2019    Patent stent   • CARDIOVASCULAR STRESS TEST  06/04/2007     Stress- 4 Min 85% THR. Inferior Infarct .     • CARDIOVASCULAR STRESS TEST  07/30/2009    Stress- 4 Min, 36 sec. 97% THR. BP- 182/92. Small lateral Ischemia.    • CARDIOVASCULAR STRESS TEST  05/10/2011    Stress- 3 min 30 sec. 86% THR. 170/82. Negative.    • CARDIOVASCULAR STRESS TEST  05/24/2016    (Mod) 7 Min, 14 Secs. 91% THR. BP- 178/88. Negative   • CARDIOVASCULAR STRESS TEST  11/14/2018    L. Cardiolite- EF 53%. Reverse Redistribution seen in the Inferior Wall   • CARDIOVASCULAR STRESS TEST  07/24/2019    @ [facility]. Lexiscan. Inferior Infarct. EF 67%   • CARDIOVASCULAR STRESS TEST  01/19/2021    Lexiscan- EF 59%. Inferior Infarct.   • CHOLECYSTECTOMY     • ECHO - CONVERTED  06/04/2007    Echo- EF 65%.    • ECHO - CONVERTED  07/30/2009    Echo- EF >60%, Mild MR.    • ECHO - CONVERTED  04/27/2010    S. Echo- 5 Min, 49 Sec. 88% THR. BP_ 178/80. Negative.    • ECHO - CONVERTED   01/10/2018    EF 65%   • ECHO - CONVERTED  2019    [facility]- TLS- LVEF >55%, mild LVH, no WMA   • KNEE ARTHROPLASTY Left    • US CAROTID UNILATERAL  2012    Carotid US- Normal.    • US CAROTID UNILATERAL  2018    bilateral- no hemodynamically significant stenosis       Family History:   Family History   Problem Relation Age of Onset   • Diabetes Mother    • Alzheimer's disease Mother    • Hypertension Mother    • Hypothyroidism Mother        Social History:   Social History     Socioeconomic History   • Marital status:    Tobacco Use   • Smoking status: Former     Packs/day: 1.50     Years: 15.00     Pack years: 22.50     Types: Cigarettes     Start date: 1960     Quit date: 1992     Years since quittin.7   • Smokeless tobacco: Current     Types: Snuff   Vaping Use   • Vaping Use: Never used   Substance and Sexual Activity   • Alcohol use: No   • Drug use: No       Medications:     Current Outpatient Medications:   •  albuterol sulfate  (90 Base) MCG/ACT inhaler, Inhale 2 puffs Every 4 (Four) Hours As Needed for Wheezing., Disp: , Rfl:   •  ARIPiprazole (ABILIFY) 5 MG tablet, Take 1 tablet by mouth Daily., Disp: , Rfl:   •  aspirin  MG tablet, Take 1 tablet by mouth Daily., Disp: , Rfl:   •  atorvastatin (LIPITOR) 40 MG tablet, Take 1 tablet by mouth Daily., Disp: , Rfl:   •  budesonide (PULMICORT) 0.5 MG/2ML nebulizer solution, , Disp: , Rfl:   •  carbidopa-levodopa (SINEMET)  MG per tablet, Take 1 tablet by mouth 4 (Four) Times a Day., Disp: 120 tablet, Rfl: 5  •  diltiaZEM CD (CARDIZEM CD) 180 MG 24 hr capsule, Take 1 capsule by mouth Daily., Disp: , Rfl:   •  DULoxetine (CYMBALTA) 60 MG capsule, Take 1 capsule by mouth Every Other Day., Disp: , Rfl:   •  Fluticasone-Umeclidin-Vilant (TRELEGY) 100-62.5-25 MCG/INH inhaler, Inhale Daily., Disp: , Rfl:   •  formoterol (PERFOROMIST) 20 MCG/2ML nebulizer solution, Take  by nebulization 2 (Two) Times a  Day., Disp: , Rfl:   •  ipratropium-albuterol (DUO-NEB) 0.5-2.5 mg/3 ml nebulizer, Take 3 mL by nebulization Every 4 (Four) Hours As Needed for Wheezing., Disp: , Rfl:   •  isosorbide mononitrate (IMDUR) 30 MG 24 hr tablet, TAKE 1 TABLET BY MOUTH EVERY DAY, Disp: 90 tablet, Rfl: 0  •  levothyroxine (SYNTHROID, LEVOTHROID) 175 MCG tablet, Take 1 tablet by mouth Daily., Disp: , Rfl:   •  LORazepam (ATIVAN) 1 MG tablet, Take 1 tablet by mouth Every Night., Disp: , Rfl:   •  metFORMIN (GLUCOPHAGE) 500 MG tablet, Take 1 tablet by mouth 2 (Two) Times a Day With Meals., Disp: , Rfl:   •  multivitamin with minerals tablet tablet, Take 1 tablet by mouth Daily., Disp: , Rfl:   •  O2 (OXYGEN), Inhale 3 L/min As Needed., Disp: , Rfl:   •  pramipexole (MIRAPEX) 0.25 MG tablet, Take 1 tablet by mouth Daily., Disp: , Rfl:   •  predniSONE (DELTASONE) 20 MG tablet, Take 1 tablet by mouth As Needed., Disp: , Rfl:   •  propranolol (INDERAL) 20 MG tablet, One tablet twice a day. An extra tablet can be taken if BP and heart rate not at goal. (Patient taking differently: 1 tablet 3 (Three) Times a Day. One tablet twice a day. An extra tablet can be taken if BP and heart rate not at goal.), Disp: 225 tablet, Rfl: 2  •  roflumilast (DALIRESP) 500 MCG tablet tablet, 1 tablet., Disp: , Rfl:   •  tamsulosin (FLOMAX) 0.4 MG capsule 24 hr capsule, Take 1 capsule by mouth Daily., Disp: , Rfl:   •  vitamin D (ERGOCALCIFEROL) 21210 UNITS capsule capsule, Take 1 capsule by mouth 1 (One) Time Per Week., Disp: , Rfl:     Allergies:   No Known Allergies    PHQ-9 Total Score:     STEADI Fall Risk Assessment was completed, and patient is at HIGH risk for falls. Assessment completed on:6/7/2023    Objective     Physical Exam:   MMSE 25/30    Physical Exam  Constitutional:       General: He is not in acute distress.     Appearance: Normal appearance. He is obese. He is not ill-appearing, toxic-appearing or diaphoretic.   HENT:      Head: Normocephalic.     "  Nose: Nose normal.   Cardiovascular:      Rate and Rhythm: Normal rate.      Pulses: Normal pulses.   Pulmonary:      Effort: Pulmonary effort is normal.   Skin:     General: Skin is warm.   Neurological:      Mental Status: He is alert and oriented to person, place, and time.      Motor: Weakness present.      Coordination: Coordination abnormal.      Gait: Gait abnormal.      Deep Tendon Reflexes: Reflexes normal.      Comments: Slow blinking and masked face. Right hand flapping. +2 biceps DTR bilat. +1 DTR bilat patella DTR.   Psychiatric:         Mood and Affect: Mood normal.         Behavior: Behavior normal.         Thought Content: Thought content normal.         Judgment: Judgment normal.           Vital Signs:   Vitals:    06/07/23 1257   BP: 92/66   Pulse: 88   SpO2: 92%   Weight: 112 kg (247 lb 6.4 oz)   Height: 190.5 cm (75\")     Body mass index is 30.92 kg/m².       Assessment / Plan      Assessment/Plan:   Diagnoses and all orders for this visit:    1. Parkinsonian tremor (Primary)  -     Acetylcholine Receptor Antibody Panel; Future  -     CBC Auto Differential; Future  -     Comprehensive Metabolic Panel; Future  -     TSH; Future  -     Vitamin B12 & Folate; Future  -     carbidopa-levodopa (SINEMET)  MG per tablet; Take 1 tablet by mouth 4 (Four) Times a Day.  Dispense: 120 tablet; Refill: 5  -     Sedimentation Rate; Future  -     C-reactive Protein; Future    2. Abnormal findings on diagnostic imaging of other specified body structures  -     TSH; Future    3. At high risk for falls  Comments:  Use walker to prevent falls       Patient Education:     Reviewed medications, potential side effects and signs and symptoms to report. Discussed risk versus benefits of treatment plan with patient and/or family-including medications, labs and radiology that may be ordered. Addressed questions and concerns during visit. Patient and/or family verbalized understanding and agree with plan. Instructed " to call the office with any questions and report to ER with any life-threatening symptoms.     Follow Up:   Return in about 6 months (around 12/7/2023) for Recheck.    During this visit the following were done:  Labs Reviewed [x]    Labs Ordered [x]    Radiology Reports Reviewed [x]    Radiology Ordered []    PCP Records Reviewed []    Referring Provider Records Reviewed []    ER Records Reviewed []    Hospital Records Reviewed []    History Obtained From Family []    Radiology Images Reviewed []    Other Reviewed [x]    Records Requested []      Jonny Mcclain, DNP, APRN

## 2023-06-07 NOTE — PROGRESS NOTES
Neuro Office Visit      Encounter Date: 2023   Patient Name: Robbie Carmen  : 1945   MRN: [id number]     Chief Complaint:    Chief Complaint   Patient presents with    Tremors       History of Present Illness: Robbie Carmen is a 77 y.o. male who is here today in Neurology for  parkinsonian tremor.        Subjective    Last visit 23 telehealth visit with me-Sinemet 25/100 1 tid. Use walker to prevent falls.      Parkinsonian Tremor  Taking C/L 25/100 1 TID No side effects. Wife has noted an improvement in his tremor.  Less falls. Using rolling walker. Has increased weakness and fatigue. Denies ptosis and fatigue with chewing. Wife has noted increased confusion and active dreaming. Denies hallucinations. Good appetite but continues to lose weight. Wife notes increased masked face and slowing of movements.  MMSE 25/30    PH  Onset 2022. Fairly sudden onset of right hand tremor. Occurs mostly at rest. Intermittent throughout the day. At times he will shake while eating. Right hand will flap and right forearm with rotational tremor. He has to hold his arm with his left hand or hook his hand in his shirt at night. Tremor is less if he lays his hand on his chest.     Family reports masked face, micrographia, dysphagia, flexed posture, falling backward when lowering himself to toilet, bradykinesia w slow blinking     Denies vivid dreams, hallucinations, slurred speech, shuffling gait, trouble rolling over in bed.     No aggravating or alleviating factors. He does not drink etoh.  He does drink caffeine.  Meds include albuterol and propranolol. Propranolol did not improve his tremor  Uncle with PD.     Notes he has lost 40 pounds without trying. Has a decreased appetite.     Seeing Dr Linton for thrombocytopenia. Bone marrow biopsy 2021. No diagnosis. Splenomegally on CT.  Seeing Dr Ashford-Rheumatology note reviewed: inflammatory polyarthopathy, sjogren's recently diagnosed-no  treatment..  Saw ENT Dr Ash for lip biopsy  Dr Rojas-cardiology     Labs from PCP: glucose 159, sodium 145. tsh-nml, cbc w low platelets  A1c 5.2  JAMA crp, sed rate, c3, ANCA, RF-neg  Sjogren's anti-SS-B + 8.0  M-spike-neg  MRI Brain Without Contrast (08/21/2017 23:22)-IMPRESSION:  1.  Motion limited exam.  2.  Redemonstration of small left lateral convexity and left parafalcine   subdural fluid collections.  The signal intensity is compatible with subacute   subdural hematomas.  No acute bleed identified.    3.  Approximately 6 mm rightward midline shift, unchanged.    MRI Brain With & Without Contrast (02/06/2023 13:18)-Impression:   Chronic and age-related changes are present as above. There is otherwise no evidence of acute ischemia, hemorrhage, mass or abnormal enhancement.         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 Brother w PD  SH: uses snuff, -etoh  Subjective             Past Medical History:   Past Medical History:   Diagnosis Date    Asthma     Basal cell carcinoma     Cancer     Larnyx CA    COPD (chronic obstructive pulmonary disease)     Coronary artery disease     Diabetes mellitus     H/O hernia repair     Ruptured hernia repair    History of cholecystectomy     History of primary laryngeal cancer 12/21/2022    History of subdural hematoma 12/21/2022    HTN (hypertension)     Hypercholesterolemia     Hypothyroidism     IHD (ischemic heart disease)      s/p stenting    Intracranial hemorrhage     Movement disorder     Parkinson disease     Rheumatic fever     as child    Sjogren's syndrome 12/21/2022    Sleep apnea     on CPAP.    Spinal cord cysts     removed    Thrombocytopenia 12/21/2022    Thyroid cyst     removed       Past  Surgical History:   Past Surgical History:   Procedure Laterality Date    CARDIAC CATHETERIZATION  08/17/1999    65% RCA.     CARDIAC CATHETERIZATION  11/30/2004     75% RCA. 3.5 x 32 mm Taxus Stent.     CARDIAC CATHETERIZATION  11/17/2005     75% in-Stent Stenosis. 3.5 x 28 mm Cypher Stent    CARDIAC CATHETERIZATION  05/24/2011     Patent Stent in RCA. PA- 44 mmHg     CARDIAC CATHETERIZATION  03/04/2019    Patent stent    CARDIOVASCULAR STRESS TEST  06/04/2007     Stress- 4 Min 85% THR. Inferior Infarct .      CARDIOVASCULAR STRESS TEST  07/30/2009    Stress- 4 Min, 36 sec. 97% THR. BP- 182/92. Small lateral Ischemia.     CARDIOVASCULAR STRESS TEST  05/10/2011    Stress- 3 min 30 sec. 86% THR. 170/82. Negative.     CARDIOVASCULAR STRESS TEST  05/24/2016    (Mod) 7 Min, 14 Secs. 91% THR. BP- 178/88. Negative    CARDIOVASCULAR STRESS TEST  11/14/2018    L. Cardiolite- EF 53%. Reverse Redistribution seen in the Inferior Wall    CARDIOVASCULAR STRESS TEST  07/24/2019    @ [facility]. Lexiscan. Inferior Infarct. EF 67%    CARDIOVASCULAR STRESS TEST  01/19/2021    Lexiscan- EF 59%. Inferior Infarct.    CHOLECYSTECTOMY      ECHO - CONVERTED  06/04/2007    Echo- EF 65%.     ECHO - CONVERTED  07/30/2009    Echo- EF >60%, Mild MR.     ECHO - CONVERTED  04/27/2010    S. Echo- 5 Min, 49 Sec. 88% THR. BP_ 178/80. Negative.     ECHO - CONVERTED  01/10/2018    EF 65%    ECHO - CONVERTED  07/24/2019    [facility]- TLS- LVEF >55%, mild LVH, no WMA    KNEE ARTHROPLASTY Left     US CAROTID UNILATERAL  05/11/2012    Carotid US- Normal.     US CAROTID UNILATERAL  11/16/2018    bilateral- no hemodynamically significant stenosis       Family History:   Family History   Problem Relation Age of Onset    Diabetes Mother     Alzheimer's disease Mother     Hypertension Mother     Hypothyroidism Mother        Social History:   Social History     Socioeconomic History    Marital status:    Tobacco Use    Smoking status: Former      Packs/day: 1.50     Years: 15.00     Pack years: 22.50     Types: Cigarettes     Start date: 1960     Quit date: 1992     Years since quittin.7    Smokeless tobacco: Current     Types: Snuff   Vaping Use    Vaping Use: Never used   Substance and Sexual Activity    Alcohol use: No    Drug use: No       Medications:     Current Outpatient Medications:     albuterol sulfate  (90 Base) MCG/ACT inhaler, Inhale 2 puffs Every 4 (Four) Hours As Needed for Wheezing., Disp: , Rfl:     ARIPiprazole (ABILIFY) 5 MG tablet, Take 1 tablet by mouth Daily., Disp: , Rfl:     aspirin  MG tablet, Take 1 tablet by mouth Daily., Disp: , Rfl:     atorvastatin (LIPITOR) 40 MG tablet, Take 1 tablet by mouth Daily., Disp: , Rfl:     budesonide (PULMICORT) 0.5 MG/2ML nebulizer solution, , Disp: , Rfl:     carbidopa-levodopa (SINEMET)  MG per tablet, Take 1 tablet by mouth 4 (Four) Times a Day., Disp: 120 tablet, Rfl: 5    diltiaZEM CD (CARDIZEM CD) 180 MG 24 hr capsule, Take 1 capsule by mouth Daily., Disp: , Rfl:     DULoxetine (CYMBALTA) 60 MG capsule, Take 1 capsule by mouth Every Other Day., Disp: , Rfl:     Fluticasone-Umeclidin-Vilant (TRELEGY) 100-62.5-25 MCG/INH inhaler, Inhale Daily., Disp: , Rfl:     formoterol (PERFOROMIST) 20 MCG/2ML nebulizer solution, Take  by nebulization 2 (Two) Times a Day., Disp: , Rfl:     ipratropium-albuterol (DUO-NEB) 0.5-2.5 mg/3 ml nebulizer, Take 3 mL by nebulization Every 4 (Four) Hours As Needed for Wheezing., Disp: , Rfl:     isosorbide mononitrate (IMDUR) 30 MG 24 hr tablet, TAKE 1 TABLET BY MOUTH EVERY DAY, Disp: 90 tablet, Rfl: 0    levothyroxine (SYNTHROID, LEVOTHROID) 175 MCG tablet, Take 1 tablet by mouth Daily., Disp: , Rfl:     LORazepam (ATIVAN) 1 MG tablet, Take 1 tablet by mouth Every Night., Disp: , Rfl:     metFORMIN (GLUCOPHAGE) 500 MG tablet, Take 1 tablet by mouth 2 (Two) Times a Day With Meals., Disp: , Rfl:     multivitamin with minerals tablet  tablet, Take 1 tablet by mouth Daily., Disp: , Rfl:     O2 (OXYGEN), Inhale 3 L/min As Needed., Disp: , Rfl:     pramipexole (MIRAPEX) 0.25 MG tablet, Take 1 tablet by mouth Daily., Disp: , Rfl:     predniSONE (DELTASONE) 20 MG tablet, Take 1 tablet by mouth As Needed., Disp: , Rfl:     propranolol (INDERAL) 20 MG tablet, One tablet twice a day. An extra tablet can be taken if BP and heart rate not at goal. (Patient taking differently: 1 tablet 3 (Three) Times a Day. One tablet twice a day. An extra tablet can be taken if BP and heart rate not at goal.), Disp: 225 tablet, Rfl: 2    roflumilast (DALIRESP) 500 MCG tablet tablet, 1 tablet., Disp: , Rfl:     tamsulosin (FLOMAX) 0.4 MG capsule 24 hr capsule, Take 1 capsule by mouth Daily., Disp: , Rfl:     vitamin D (ERGOCALCIFEROL) 18855 UNITS capsule capsule, Take 1 capsule by mouth 1 (One) Time Per Week., Disp: , Rfl:     Allergies:   No Known Allergies    PHQ-9 Total Score:     STEADI Fall Risk Assessment was completed, and patient is at HIGH risk for falls. Assessment completed on:6/7/2023    Objective     Physical Exam:   MMSE 25/30    Physical Exam  Constitutional:       General: He is not in acute distress.     Appearance: Normal appearance. He is obese. He is not ill-appearing, toxic-appearing or diaphoretic.   HENT:      Head: Normocephalic.      Nose: Nose normal.   Cardiovascular:      Rate and Rhythm: Normal rate.      Pulses: Normal pulses.   Pulmonary:      Effort: Pulmonary effort is normal.   Skin:     General: Skin is warm.   Neurological:      Mental Status: He is alert and oriented to person, place, and time.      Motor: Weakness present.      Coordination: Coordination abnormal.      Gait: Gait abnormal.      Deep Tendon Reflexes: Reflexes normal.      Comments: Slow blinking and masked face. Right hand flapping. +2 biceps DTR bilat. +1 DTR bilat patella DTR.   Psychiatric:         Mood and Affect: Mood normal.         Behavior: Behavior normal.     "     Thought Content: Thought content normal.         Judgment: Judgment normal.           Vital Signs:   Vitals:    06/07/23 1257   BP: 92/66   Pulse: 88   SpO2: 92%   Weight: 112 kg (247 lb 6.4 oz)   Height: 190.5 cm (75\")     Body mass index is 30.92 kg/m².       Assessment / Plan      Assessment/Plan:   Diagnoses and all orders for this visit:    1. Parkinsonian tremor (Primary)  -     Acetylcholine Receptor Antibody Panel; Future  -     CBC Auto Differential; Future  -     Comprehensive Metabolic Panel; Future  -     TSH; Future  -     Vitamin B12 & Folate; Future  -     carbidopa-levodopa (SINEMET)  MG per tablet; Take 1 tablet by mouth 4 (Four) Times a Day.  Dispense: 120 tablet; Refill: 5  -     Sedimentation Rate; Future  -     C-reactive Protein; Future    2. Abnormal findings on diagnostic imaging of other specified body structures  -     TSH; Future    3. At high risk for falls  Comments:  Use walker to prevent falls       Patient Education:     Reviewed medications, potential side effects and signs and symptoms to report. Discussed risk versus benefits of treatment plan with patient and/or family-including medications, labs and radiology that may be ordered. Addressed questions and concerns during visit. Patient and/or family verbalized understanding and agree with plan. Instructed to call the office with any questions and report to ER with any life-threatening symptoms.     Follow Up:   Return in about 6 months (around 12/7/2023) for Recheck.    During this visit the following were done:  Labs Reviewed [x]    Labs Ordered [x]    Radiology Reports Reviewed [x]    Radiology Ordered []    PCP Records Reviewed []    Referring Provider Records Reviewed []    ER Records Reviewed []    Hospital Records Reviewed []    History Obtained From Family []    Radiology Images Reviewed []    Other Reviewed [x]    Records Requested []      Jonny Mcclain, NOAH, APRN  "

## 2023-06-21 LAB
ACHR BIND AB SER-SCNC: <0.03 NMOL/L (ref 0–0.24)
ACHR BLOCK AB SER-ACNC: 22 % (ref 0–25)
ACHR MOD AB SER QL FC: 0 % (ref 0–45)
ALBUMIN SERPL-MCNC: 4 G/DL (ref 3.7–4.7)
ALBUMIN/GLOB SERPL: 2.4 {RATIO} (ref 1.2–2.2)
ALP SERPL-CCNC: 132 IU/L (ref 44–121)
ALT SERPL-CCNC: 18 IU/L (ref 0–44)
AST SERPL-CCNC: 15 IU/L (ref 0–40)
BASOPHILS # BLD AUTO: 0.1 X10E3/UL (ref 0–0.2)
BASOPHILS NFR BLD AUTO: 1 %
BILIRUB SERPL-MCNC: 1.1 MG/DL (ref 0–1.2)
BUN SERPL-MCNC: 16 MG/DL (ref 8–27)
BUN/CREAT SERPL: 13 (ref 10–24)
CALCIUM SERPL-MCNC: 10 MG/DL (ref 8.6–10.2)
CHLORIDE SERPL-SCNC: 104 MMOL/L (ref 96–106)
CO2 SERPL-SCNC: 25 MMOL/L (ref 20–29)
CREAT SERPL-MCNC: 1.25 MG/DL (ref 0.76–1.27)
CRP SERPL-MCNC: 1 MG/L (ref 0–10)
EGFRCR SERPLBLD CKD-EPI 2021: 59 ML/MIN/1.73
EOSINOPHIL # BLD AUTO: 0.1 X10E3/UL (ref 0–0.4)
EOSINOPHIL NFR BLD AUTO: 1 %
ERYTHROCYTE [DISTWIDTH] IN BLOOD BY AUTOMATED COUNT: 16.6 % (ref 11.6–15.4)
ERYTHROCYTE [SEDIMENTATION RATE] IN BLOOD BY WESTERGREN METHOD: 13 MM/HR (ref 0–30)
FOLATE SERPL-MCNC: 8 NG/ML
GLOBULIN SER CALC-MCNC: 1.7 G/DL (ref 1.5–4.5)
GLUCOSE SERPL-MCNC: 99 MG/DL (ref 70–99)
HCT VFR BLD AUTO: 38.5 % (ref 37.5–51)
HGB BLD-MCNC: 13.2 G/DL (ref 13–17.7)
IMM GRANULOCYTES # BLD AUTO: 0.1 X10E3/UL (ref 0–0.1)
IMM GRANULOCYTES NFR BLD AUTO: 2 %
LYMPHOCYTES # BLD AUTO: 1.6 X10E3/UL (ref 0.7–3.1)
LYMPHOCYTES NFR BLD AUTO: 31 %
MCH RBC QN AUTO: 29.2 PG (ref 26.6–33)
MCHC RBC AUTO-ENTMCNC: 34.3 G/DL (ref 31.5–35.7)
MCV RBC AUTO: 85 FL (ref 79–97)
MONOCYTES # BLD AUTO: 0.6 X10E3/UL (ref 0.1–0.9)
MONOCYTES NFR BLD AUTO: 12 %
NEUTROPHILS # BLD AUTO: 2.8 X10E3/UL (ref 1.4–7)
NEUTROPHILS NFR BLD AUTO: 53 %
PLATELET # BLD AUTO: 113 X10E3/UL (ref 150–450)
POTASSIUM SERPL-SCNC: 3.8 MMOL/L (ref 3.5–5.2)
PROT SERPL-MCNC: 5.7 G/DL (ref 6–8.5)
RBC # BLD AUTO: 4.52 X10E6/UL (ref 4.14–5.8)
SODIUM SERPL-SCNC: 146 MMOL/L (ref 134–144)
TSH SERPL DL<=0.005 MIU/L-ACNC: 2.48 UIU/ML (ref 0.45–4.5)
VIT B12 SERPL-MCNC: 457 PG/ML (ref 232–1245)
WBC # BLD AUTO: 5.2 X10E3/UL (ref 3.4–10.8)

## 2024-08-05 NOTE — TELEPHONE ENCOUNTER
Please inform order placed for Lexiscan and echo. Thanks.    Previously Declined (within the last year)